# Patient Record
Sex: FEMALE | Race: ASIAN | Employment: OTHER | URBAN - METROPOLITAN AREA
[De-identification: names, ages, dates, MRNs, and addresses within clinical notes are randomized per-mention and may not be internally consistent; named-entity substitution may affect disease eponyms.]

---

## 2017-08-07 PROBLEM — F41.9 ANXIETY: Status: ACTIVE | Noted: 2017-08-07

## 2017-11-29 ENCOUNTER — HOSPITAL ENCOUNTER (OUTPATIENT)
Dept: MAMMOGRAPHY | Age: 67
Discharge: HOME | End: 2017-11-29
Attending: FAMILY MEDICINE
Payer: OTHER GOVERNMENT

## 2017-11-29 DIAGNOSIS — Z12.31 VISIT FOR SCREENING MAMMOGRAM: ICD-10-CM

## 2017-11-29 PROCEDURE — 77067 SCR MAMMO BI INCL CAD: CPT

## 2017-11-30 ENCOUNTER — APPOINTMENT (RX ONLY)
Dept: URBAN - METROPOLITAN AREA CLINIC 349 | Facility: CLINIC | Age: 67
Setting detail: DERMATOLOGY
End: 2017-11-30

## 2017-11-30 DIAGNOSIS — L40.0 PSORIASIS VULGARIS: ICD-10-CM | Status: WELL CONTROLLED

## 2017-11-30 DIAGNOSIS — L65.9 NONSCARRING HAIR LOSS, UNSPECIFIED: ICD-10-CM

## 2017-11-30 PROBLEM — J45.909 UNSPECIFIED ASTHMA, UNCOMPLICATED: Status: ACTIVE | Noted: 2017-11-30

## 2017-11-30 PROBLEM — E78.5 HYPERLIPIDEMIA, UNSPECIFIED: Status: ACTIVE | Noted: 2017-11-30

## 2017-11-30 PROBLEM — I10 ESSENTIAL (PRIMARY) HYPERTENSION: Status: ACTIVE | Noted: 2017-11-30

## 2017-11-30 PROCEDURE — ? PRESCRIPTION

## 2017-11-30 PROCEDURE — ? RECOMMENDATIONS

## 2017-11-30 PROCEDURE — 99214 OFFICE O/P EST MOD 30 MIN: CPT

## 2017-11-30 PROCEDURE — ? COUNSELING

## 2017-11-30 RX ORDER — CALCIPOTRIENE AND BETAMETHASONE DIPROPIONATE 50; .5 UG/G; MG/G
SUSPENSION TOPICAL
Qty: 1 | Refills: 3 | Status: ERX

## 2017-11-30 ASSESSMENT — LOCATION SIMPLE DESCRIPTION DERM
LOCATION SIMPLE: SCALP
LOCATION SIMPLE: POSTERIOR SCALP
LOCATION SIMPLE: LEFT ELBOW
LOCATION SIMPLE: LEFT KNEE
LOCATION SIMPLE: RIGHT KNEE
LOCATION SIMPLE: RIGHT ELBOW

## 2017-11-30 ASSESSMENT — LOCATION DETAILED DESCRIPTION DERM
LOCATION DETAILED: RIGHT LATERAL ELBOW
LOCATION DETAILED: LEFT KNEE
LOCATION DETAILED: POSTERIOR MID-PARIETAL SCALP
LOCATION DETAILED: RIGHT KNEE
LOCATION DETAILED: LEFT ELBOW
LOCATION DETAILED: LEFT SUPERIOR PARIETAL SCALP

## 2017-11-30 ASSESSMENT — LOCATION ZONE DERM
LOCATION ZONE: SCALP
LOCATION ZONE: ARM
LOCATION ZONE: LEG

## 2017-11-30 NOTE — PROCEDURE: RECOMMENDATIONS
Detail Level: Zone
Recommendations (Free Text): Qilib to use daily as directed for at least one year.
Recommendations (Free Text): Continue taclonex on patches twice daily x 2 wks then once daily as needed\\nFluocinonolone scalp oil as needed for itching scaling spots on scalp

## 2017-12-07 NOTE — PROGRESS NOTES
Please call the patient regarding her abnormal result. Thank you! Mammogram reported as asymmetry. Sometimes this happens when women have dense or fibrous breasts. They recommend spot compression and ultrasound.

## 2017-12-11 ENCOUNTER — HOSPITAL ENCOUNTER (OUTPATIENT)
Dept: MAMMOGRAPHY | Age: 67
Discharge: HOME | End: 2017-12-11
Attending: FAMILY MEDICINE
Payer: OTHER GOVERNMENT

## 2017-12-11 DIAGNOSIS — R92.8 ABNORMAL SCREENING MAMMOGRAM: ICD-10-CM

## 2017-12-11 PROCEDURE — 77065 DX MAMMO INCL CAD UNI: CPT

## 2018-01-23 PROBLEM — H91.11 PRESBYCUSIS OF RIGHT EAR WITH UNRESTRICTED HEARING OF LEFT EAR: Status: ACTIVE | Noted: 2018-01-23

## 2018-11-29 ENCOUNTER — APPOINTMENT (RX ONLY)
Dept: URBAN - METROPOLITAN AREA CLINIC 349 | Facility: CLINIC | Age: 68
Setting detail: DERMATOLOGY
End: 2018-11-29

## 2018-11-29 DIAGNOSIS — L40.0 PSORIASIS VULGARIS: ICD-10-CM | Status: WELL CONTROLLED

## 2018-11-29 DIAGNOSIS — L65.9 NONSCARRING HAIR LOSS, UNSPECIFIED: ICD-10-CM | Status: IMPROVED

## 2018-11-29 PROBLEM — E78.5 HYPERLIPIDEMIA, UNSPECIFIED: Status: ACTIVE | Noted: 2018-11-29

## 2018-11-29 PROCEDURE — ? PRESCRIPTION

## 2018-11-29 PROCEDURE — 99213 OFFICE O/P EST LOW 20 MIN: CPT

## 2018-11-29 PROCEDURE — ? RECOMMENDATIONS

## 2018-11-29 PROCEDURE — ? COUNSELING

## 2018-11-29 RX ORDER — FLUOCINOLONE ACETONIDE 0.11 MG/ML
OIL TOPICAL
Qty: 1 | Refills: 3 | Status: ERX

## 2018-11-29 RX ORDER — CALCIPOTRIENE AND BETAMETHASONE DIPROPIONATE 50; .5 UG/G; MG/G
SUSPENSION TOPICAL
Qty: 1 | Refills: 3 | Status: ERX

## 2018-11-29 ASSESSMENT — LOCATION SIMPLE DESCRIPTION DERM
LOCATION SIMPLE: POSTERIOR SCALP
LOCATION SIMPLE: SCALP
LOCATION SIMPLE: LEFT ELBOW
LOCATION SIMPLE: LEFT KNEE
LOCATION SIMPLE: RIGHT KNEE
LOCATION SIMPLE: RIGHT ELBOW

## 2018-11-29 ASSESSMENT — LOCATION DETAILED DESCRIPTION DERM
LOCATION DETAILED: RIGHT LATERAL ELBOW
LOCATION DETAILED: LEFT ELBOW
LOCATION DETAILED: LEFT SUPERIOR PARIETAL SCALP
LOCATION DETAILED: POSTERIOR MID-PARIETAL SCALP
LOCATION DETAILED: RIGHT KNEE
LOCATION DETAILED: LEFT KNEE

## 2018-11-29 ASSESSMENT — LOCATION ZONE DERM
LOCATION ZONE: ARM
LOCATION ZONE: LEG
LOCATION ZONE: SCALP

## 2019-01-10 PROBLEM — J30.9 ALLERGIC RHINITIS: Status: ACTIVE | Noted: 2019-01-10

## 2019-01-18 ENCOUNTER — HOSPITAL ENCOUNTER (OUTPATIENT)
Dept: MAMMOGRAPHY | Age: 69
Discharge: HOME | End: 2019-01-18
Payer: OTHER GOVERNMENT

## 2019-01-18 DIAGNOSIS — Z12.31 VISIT FOR SCREENING MAMMOGRAM: ICD-10-CM

## 2019-01-18 PROCEDURE — 77067 SCR MAMMO BI INCL CAD: CPT

## 2019-04-10 PROBLEM — Z12.39 SCREENING FOR BREAST CANCER: Status: ACTIVE | Noted: 2019-04-10

## 2019-04-10 PROBLEM — Z71.89 ACP (ADVANCE CARE PLANNING): Status: ACTIVE | Noted: 2019-04-10

## 2019-04-10 PROBLEM — Z00.00 PHYSICAL EXAM: Status: ACTIVE | Noted: 2019-04-10

## 2019-04-10 PROBLEM — Z23 ENCOUNTER FOR IMMUNIZATION: Status: ACTIVE | Noted: 2019-04-10

## 2019-09-23 PROBLEM — Z91.09 ENVIRONMENTAL ALLERGIES: Status: ACTIVE | Noted: 2019-09-23

## 2019-09-23 PROBLEM — N18.9 CHRONIC KIDNEY DISEASE: Status: ACTIVE | Noted: 2019-09-23

## 2019-11-27 ENCOUNTER — APPOINTMENT (RX ONLY)
Dept: URBAN - METROPOLITAN AREA CLINIC 349 | Facility: CLINIC | Age: 69
Setting detail: DERMATOLOGY
End: 2019-11-27

## 2019-11-27 DIAGNOSIS — L40.0 PSORIASIS VULGARIS: ICD-10-CM | Status: WELL CONTROLLED

## 2019-11-27 PROBLEM — I10 ESSENTIAL (PRIMARY) HYPERTENSION: Status: ACTIVE | Noted: 2019-11-27

## 2019-11-27 PROBLEM — E13.9 OTHER SPECIFIED DIABETES MELLITUS WITHOUT COMPLICATIONS: Status: ACTIVE | Noted: 2019-11-27

## 2019-11-27 PROBLEM — J45.909 UNSPECIFIED ASTHMA, UNCOMPLICATED: Status: ACTIVE | Noted: 2019-11-27

## 2019-11-27 PROCEDURE — ? TREATMENT REGIMEN

## 2019-11-27 PROCEDURE — ? COUNSELING

## 2019-11-27 PROCEDURE — ? PRESCRIPTION

## 2019-11-27 PROCEDURE — 99213 OFFICE O/P EST LOW 20 MIN: CPT

## 2019-11-27 RX ORDER — FLUOCINOLONE ACETONIDE 0.11 MG/ML
OIL TOPICAL
Qty: 1 | Refills: 6 | Status: ERX | COMMUNITY
Start: 2019-11-27

## 2019-11-27 RX ORDER — CALCIPOTRIENE AND BETAMETHASONE DIPROPIONATE 50; .5 UG/G; MG/G
SUSPENSION TOPICAL
Qty: 1 | Refills: 6 | Status: ERX | COMMUNITY
Start: 2019-11-27

## 2019-11-27 RX ADMIN — FLUOCINOLONE ACETONIDE: 0.11 OIL TOPICAL at 00:00

## 2019-11-27 RX ADMIN — CALCIPOTRIENE AND BETAMETHASONE DIPROPIONATE: 50; .5 SUSPENSION TOPICAL at 00:00

## 2019-11-27 ASSESSMENT — LOCATION SIMPLE DESCRIPTION DERM
LOCATION SIMPLE: RIGHT ELBOW
LOCATION SIMPLE: RIGHT KNEE
LOCATION SIMPLE: LEFT KNEE
LOCATION SIMPLE: LEFT ELBOW
LOCATION SIMPLE: SCALP

## 2019-11-27 ASSESSMENT — LOCATION ZONE DERM
LOCATION ZONE: ARM
LOCATION ZONE: SCALP
LOCATION ZONE: LEG

## 2019-11-27 ASSESSMENT — LOCATION DETAILED DESCRIPTION DERM
LOCATION DETAILED: RIGHT KNEE
LOCATION DETAILED: LEFT SUPERIOR PARIETAL SCALP
LOCATION DETAILED: RIGHT LATERAL ELBOW
LOCATION DETAILED: LEFT ELBOW
LOCATION DETAILED: LEFT KNEE

## 2019-11-27 NOTE — PROCEDURE: TREATMENT REGIMEN
Action 3: Continue
Detail Level: Zone
Continue Regimen: Continue taclonex on patches twice daily x 2 wks then once daily as needed\\nFluocinonolone scalp oil as needed for itching scaling spots on scalp

## 2019-12-02 ENCOUNTER — RX ONLY (OUTPATIENT)
Age: 69
Setting detail: RX ONLY
End: 2019-12-02

## 2019-12-02 RX ORDER — CALCIPOTRIENE AND BETAMETHASONE DIPROPIONATE 50; .5 UG/G; MG/G
SUSPENSION TOPICAL
Qty: 1 | Refills: 6 | Status: ERX

## 2020-03-27 PROBLEM — H90.3 SENSORINEURAL HEARING LOSS (SNHL) OF BOTH EARS: Status: ACTIVE | Noted: 2020-03-27

## 2020-09-05 ENCOUNTER — HOSPITAL ENCOUNTER (OUTPATIENT)
Dept: LAB | Age: 70
Discharge: HOME | End: 2020-09-05

## 2020-09-05 LAB
ANION GAP SERPL CALC-SCNC: 10 MMOL/L (ref 7–16)
BUN SERPL-MCNC: 20 MG/DL (ref 8–23)
CALCIUM SERPL-MCNC: 7.8 MG/DL (ref 8.3–10.4)
CHLORIDE SERPL-SCNC: 97 MMOL/L (ref 98–107)
CO2 SERPL-SCNC: 27 MMOL/L (ref 21–32)
CREAT SERPL-MCNC: 1.22 MG/DL (ref 0.6–1)
GLUCOSE SERPL-MCNC: 57 MG/DL (ref 65–100)
MAGNESIUM SERPL-MCNC: 2 MG/DL (ref 1.8–2.4)
POTASSIUM SERPL-SCNC: 3 MMOL/L (ref 3.5–5.1)
SODIUM SERPL-SCNC: 134 MMOL/L (ref 136–145)

## 2020-09-05 PROCEDURE — 83735 ASSAY OF MAGNESIUM: CPT

## 2020-09-05 PROCEDURE — 80048 BASIC METABOLIC PNL TOTAL CA: CPT

## 2020-09-29 PROBLEM — I51.89 DIASTOLIC DYSFUNCTION: Status: ACTIVE | Noted: 2020-09-29

## 2020-09-29 PROBLEM — J18.9 PNEUMONIA OF BOTH LOWER LOBES DUE TO INFECTIOUS ORGANISM: Status: ACTIVE | Noted: 2020-09-29

## 2020-09-29 PROBLEM — Z09 ENCOUNTER FOR EXAMINATION FOLLOWING TREATMENT AT HOSPITAL: Status: ACTIVE | Noted: 2020-09-29

## 2020-09-29 PROBLEM — D64.9 ABSOLUTE ANEMIA: Status: ACTIVE | Noted: 2020-09-29

## 2020-09-29 PROBLEM — R79.0 LOW MAGNESIUM LEVEL: Status: ACTIVE | Noted: 2020-09-29

## 2020-09-29 PROBLEM — E83.51 LOW CALCIUM LEVELS: Status: ACTIVE | Noted: 2020-09-29

## 2020-09-29 PROBLEM — F32.0 CURRENT MILD EPISODE OF MAJOR DEPRESSIVE DISORDER WITHOUT PRIOR EPISODE (HCC): Status: ACTIVE | Noted: 2020-09-29

## 2020-09-29 PROBLEM — R53.81 DEBILITY: Status: ACTIVE | Noted: 2020-09-29

## 2020-09-30 ENCOUNTER — HOSPITAL ENCOUNTER (OUTPATIENT)
Dept: GENERAL RADIOLOGY | Age: 70
Discharge: HOME | End: 2020-09-30
Attending: FAMILY MEDICINE
Payer: OTHER GOVERNMENT

## 2020-09-30 ENCOUNTER — HOSPITAL ENCOUNTER (OUTPATIENT)
Dept: LAB | Age: 70
Discharge: HOME | End: 2020-09-30
Attending: FAMILY MEDICINE
Payer: OTHER GOVERNMENT

## 2020-09-30 DIAGNOSIS — F41.9 ANXIETY: ICD-10-CM

## 2020-09-30 DIAGNOSIS — J45.20 MILD INTERMITTENT ASTHMA WITHOUT COMPLICATION: ICD-10-CM

## 2020-09-30 DIAGNOSIS — D50.8 OTHER IRON DEFICIENCY ANEMIA: ICD-10-CM

## 2020-09-30 DIAGNOSIS — E78.5 DYSLIPIDEMIA: ICD-10-CM

## 2020-09-30 DIAGNOSIS — J18.9 PNEUMONIA OF BOTH LOWER LOBES DUE TO INFECTIOUS ORGANISM: ICD-10-CM

## 2020-09-30 DIAGNOSIS — I51.89 DIASTOLIC DYSFUNCTION: ICD-10-CM

## 2020-09-30 DIAGNOSIS — E03.9 ACQUIRED HYPOTHYROIDISM: ICD-10-CM

## 2020-09-30 DIAGNOSIS — E83.51 LOW CALCIUM LEVELS: ICD-10-CM

## 2020-09-30 DIAGNOSIS — F32.0 CURRENT MILD EPISODE OF MAJOR DEPRESSIVE DISORDER WITHOUT PRIOR EPISODE (HCC): ICD-10-CM

## 2020-09-30 DIAGNOSIS — R79.0 LOW MAGNESIUM LEVEL: ICD-10-CM

## 2020-09-30 DIAGNOSIS — Z09 ENCOUNTER FOR EXAMINATION FOLLOWING TREATMENT AT HOSPITAL: ICD-10-CM

## 2020-09-30 LAB
ALBUMIN SERPL-MCNC: 3.4 G/DL (ref 3.2–4.6)
ALBUMIN/GLOB SERPL: 0.6 {RATIO} (ref 1.2–3.5)
ALP SERPL-CCNC: 108 U/L (ref 50–136)
ALT SERPL-CCNC: 64 U/L (ref 12–65)
ANION GAP SERPL CALC-SCNC: 7 MMOL/L (ref 7–16)
AST SERPL-CCNC: 84 U/L (ref 15–37)
BASOPHILS # BLD: 0 K/UL (ref 0–0.2)
BASOPHILS NFR BLD: 0 % (ref 0–2)
BILIRUB SERPL-MCNC: 0.6 MG/DL (ref 0.2–1.1)
BUN SERPL-MCNC: 15 MG/DL (ref 8–23)
CALCIUM SERPL-MCNC: 9.1 MG/DL (ref 8.3–10.4)
CALCIUM SERPL-MCNC: 9.1 MG/DL (ref 8.3–10.4)
CHLORIDE SERPL-SCNC: 104 MMOL/L (ref 98–107)
CHOLEST SERPL-MCNC: 228 MG/DL
CO2 SERPL-SCNC: 27 MMOL/L (ref 21–32)
CREAT SERPL-MCNC: 1.2 MG/DL (ref 0.6–1)
DIFFERENTIAL METHOD BLD: ABNORMAL
EOSINOPHIL # BLD: 0.5 K/UL (ref 0–0.8)
EOSINOPHIL NFR BLD: 6 % (ref 0.5–7.8)
ERYTHROCYTE [DISTWIDTH] IN BLOOD BY AUTOMATED COUNT: 19.1 % (ref 11.9–14.6)
EST. AVERAGE GLUCOSE BLD GHB EST-MCNC: 108 MG/DL
GLOBULIN SER CALC-MCNC: 5.8 G/DL (ref 2.3–3.5)
GLUCOSE SERPL-MCNC: 92 MG/DL (ref 65–100)
HBA1C MFR BLD: 5.4 %
HCT VFR BLD AUTO: 30.9 % (ref 35.8–46.3)
HDLC SERPL-MCNC: 74 MG/DL (ref 40–60)
HDLC SERPL: 3.1 {RATIO}
HGB BLD-MCNC: 9.8 G/DL (ref 11.7–15.4)
IMM GRANULOCYTES # BLD AUTO: 0 K/UL (ref 0–0.5)
IMM GRANULOCYTES NFR BLD AUTO: 0 % (ref 0–5)
IRON SERPL-MCNC: 63 UG/DL (ref 35–150)
LDLC SERPL CALC-MCNC: 130.2 MG/DL
LIPID PROFILE,FLP: ABNORMAL
LYMPHOCYTES # BLD: 3.5 K/UL (ref 0.5–4.6)
LYMPHOCYTES NFR BLD: 43 % (ref 13–44)
MAGNESIUM SERPL-MCNC: 2.2 MG/DL (ref 1.8–2.4)
MCH RBC QN AUTO: 26.3 PG (ref 26.1–32.9)
MCHC RBC AUTO-ENTMCNC: 31.7 G/DL (ref 31.4–35)
MCV RBC AUTO: 82.8 FL (ref 79.6–97.8)
MONOCYTES # BLD: 0.8 K/UL (ref 0.1–1.3)
MONOCYTES NFR BLD: 9 % (ref 4–12)
NEUTS SEG # BLD: 3.3 K/UL (ref 1.7–8.2)
NEUTS SEG NFR BLD: 41 % (ref 43–78)
NRBC # BLD: 0 K/UL (ref 0–0.2)
PLATELET # BLD AUTO: 199 K/UL (ref 150–450)
PMV BLD AUTO: 11.6 FL (ref 9.4–12.3)
POTASSIUM SERPL-SCNC: 3.2 MMOL/L (ref 3.5–5.1)
PROT SERPL-MCNC: 9.2 G/DL (ref 6.3–8.2)
PTH-INTACT SERPL-MCNC: 30 PG/ML (ref 18.5–88)
RBC # BLD AUTO: 3.73 M/UL (ref 4.05–5.2)
SODIUM SERPL-SCNC: 138 MMOL/L (ref 136–145)
T4 FREE SERPL-MCNC: 1.3 NG/DL (ref 0.78–1.46)
TRIGL SERPL-MCNC: 119 MG/DL (ref 35–150)
TSH SERPL DL<=0.005 MIU/L-ACNC: 13.5 UIU/ML
VLDLC SERPL CALC-MCNC: 23.8 MG/DL (ref 6–23)
WBC # BLD AUTO: 8.1 K/UL (ref 4.3–11.1)

## 2020-09-30 PROCEDURE — 83036 HEMOGLOBIN GLYCOSYLATED A1C: CPT

## 2020-09-30 PROCEDURE — 85025 COMPLETE CBC W/AUTO DIFF WBC: CPT

## 2020-09-30 PROCEDURE — 36415 COLL VENOUS BLD VENIPUNCTURE: CPT

## 2020-09-30 PROCEDURE — 83735 ASSAY OF MAGNESIUM: CPT

## 2020-09-30 PROCEDURE — 84439 ASSAY OF FREE THYROXINE: CPT

## 2020-09-30 PROCEDURE — 71046 X-RAY EXAM CHEST 2 VIEWS: CPT

## 2020-09-30 PROCEDURE — 83970 ASSAY OF PARATHORMONE: CPT

## 2020-09-30 PROCEDURE — 84443 ASSAY THYROID STIM HORMONE: CPT

## 2020-09-30 PROCEDURE — 80053 COMPREHEN METABOLIC PANEL: CPT

## 2020-09-30 PROCEDURE — 83540 ASSAY OF IRON: CPT

## 2020-09-30 PROCEDURE — 80061 LIPID PANEL: CPT

## 2020-10-07 PROBLEM — Z12.11 ENCOUNTER FOR SCREENING COLONOSCOPY: Status: ACTIVE | Noted: 2020-10-07

## 2020-10-07 PROBLEM — Z99.81 O2 DEPENDENT: Status: ACTIVE | Noted: 2020-10-07

## 2020-10-07 PROBLEM — R74.01 ELEVATED AST (SGOT): Status: ACTIVE | Noted: 2020-10-07

## 2020-10-10 PROBLEM — J96.00 ACUTE RESPIRATORY FAILURE (HCC): Status: ACTIVE | Noted: 2020-10-10

## 2020-11-18 ENCOUNTER — ANESTHESIA EVENT (OUTPATIENT)
Dept: ENDOSCOPY | Age: 70
End: 2020-11-18
Payer: OTHER GOVERNMENT

## 2020-11-19 ENCOUNTER — HOSPITAL ENCOUNTER (OUTPATIENT)
Age: 70
Setting detail: OUTPATIENT SURGERY
Discharge: HOME | End: 2020-11-19
Attending: INTERNAL MEDICINE | Admitting: INTERNAL MEDICINE
Payer: OTHER GOVERNMENT

## 2020-11-19 ENCOUNTER — ANESTHESIA (OUTPATIENT)
Dept: ENDOSCOPY | Age: 70
End: 2020-11-19
Payer: OTHER GOVERNMENT

## 2020-11-19 VITALS
RESPIRATION RATE: 14 BRPM | DIASTOLIC BLOOD PRESSURE: 70 MMHG | OXYGEN SATURATION: 95 % | TEMPERATURE: 97.8 F | SYSTOLIC BLOOD PRESSURE: 154 MMHG | BODY MASS INDEX: 22.47 KG/M2 | HEART RATE: 79 BPM | WEIGHT: 119 LBS | HEIGHT: 61 IN

## 2020-11-19 PROCEDURE — 76060000033 HC ANESTHESIA 1 TO 1.5 HR: Performed by: INTERNAL MEDICINE

## 2020-11-19 PROCEDURE — 74011000250 HC RX REV CODE- 250: Performed by: REGISTERED NURSE

## 2020-11-19 PROCEDURE — 2709999900 HC NON-CHARGEABLE SUPPLY: Performed by: INTERNAL MEDICINE

## 2020-11-19 PROCEDURE — 87081 CULTURE SCREEN ONLY: CPT

## 2020-11-19 PROCEDURE — 77030021593 HC FCPS BIOP ENDOSC BSC -A: Performed by: INTERNAL MEDICINE

## 2020-11-19 PROCEDURE — 77030013991 HC SNR POLYP ENDOSC BSC -A: Performed by: INTERNAL MEDICINE

## 2020-11-19 PROCEDURE — 88312 SPECIAL STAINS GROUP 1: CPT

## 2020-11-19 PROCEDURE — 74011250636 HC RX REV CODE- 250/636: Performed by: REGISTERED NURSE

## 2020-11-19 PROCEDURE — 88305 TISSUE EXAM BY PATHOLOGIST: CPT

## 2020-11-19 PROCEDURE — 76040000026: Performed by: INTERNAL MEDICINE

## 2020-11-19 PROCEDURE — 74011250636 HC RX REV CODE- 250/636: Performed by: ANESTHESIOLOGY

## 2020-11-19 RX ORDER — LIDOCAINE HYDROCHLORIDE 20 MG/ML
INJECTION, SOLUTION EPIDURAL; INFILTRATION; INTRACAUDAL; PERINEURAL AS NEEDED
Status: DISCONTINUED | OUTPATIENT
Start: 2020-11-19 | End: 2020-11-19 | Stop reason: HOSPADM

## 2020-11-19 RX ORDER — PROPOFOL 10 MG/ML
INJECTION, EMULSION INTRAVENOUS AS NEEDED
Status: DISCONTINUED | OUTPATIENT
Start: 2020-11-19 | End: 2020-11-19 | Stop reason: HOSPADM

## 2020-11-19 RX ORDER — SODIUM CHLORIDE, SODIUM LACTATE, POTASSIUM CHLORIDE, CALCIUM CHLORIDE 600; 310; 30; 20 MG/100ML; MG/100ML; MG/100ML; MG/100ML
25 INJECTION, SOLUTION INTRAVENOUS CONTINUOUS
Status: DISCONTINUED | OUTPATIENT
Start: 2020-11-19 | End: 2020-11-19 | Stop reason: HOSPADM

## 2020-11-19 RX ORDER — SODIUM CHLORIDE, SODIUM LACTATE, POTASSIUM CHLORIDE, CALCIUM CHLORIDE 600; 310; 30; 20 MG/100ML; MG/100ML; MG/100ML; MG/100ML
100 INJECTION, SOLUTION INTRAVENOUS CONTINUOUS
Status: DISCONTINUED | OUTPATIENT
Start: 2020-11-19 | End: 2020-11-19 | Stop reason: HOSPADM

## 2020-11-19 RX ORDER — PROPOFOL 10 MG/ML
INJECTION, EMULSION INTRAVENOUS
Status: DISCONTINUED | OUTPATIENT
Start: 2020-11-19 | End: 2020-11-19 | Stop reason: HOSPADM

## 2020-11-19 RX ADMIN — LIDOCAINE HYDROCHLORIDE 50 MG: 20 INJECTION, SOLUTION EPIDURAL; INFILTRATION; INTRACAUDAL; PERINEURAL at 09:08

## 2020-11-19 RX ADMIN — PROPOFOL 50 MG: 10 INJECTION, EMULSION INTRAVENOUS at 09:08

## 2020-11-19 RX ADMIN — PROPOFOL 140 MCG/KG/MIN: 10 INJECTION, EMULSION INTRAVENOUS at 09:08

## 2020-11-19 RX ADMIN — SODIUM CHLORIDE, SODIUM LACTATE, POTASSIUM CHLORIDE, AND CALCIUM CHLORIDE 100 ML/HR: 600; 310; 30; 20 INJECTION, SOLUTION INTRAVENOUS at 08:33

## 2020-11-19 NOTE — ANESTHESIA POSTPROCEDURE EVALUATION
Procedure(s):  ESOPHAGOGASTRODUODENOSCOPY (EGD)  COLONOSCOPY/ BMI 22  ESOPHAGOGASTRODUODENAL (EGD) BIOPSY  ENDOSCOPIC POLYPECTOMY. total IV anesthesia    Anesthesia Post Evaluation      Multimodal analgesia: multimodal analgesia not used between 6 hours prior to anesthesia start to PACU discharge  Patient location during evaluation: PACU  Patient participation: complete - patient participated  Level of consciousness: awake and alert  Pain management: adequate  Airway patency: patent  Anesthetic complications: no  Cardiovascular status: hemodynamically stable  Respiratory status: acceptable  Hydration status: acceptable        INITIAL Post-op Vital signs:   Vitals Value Taken Time   /89 11/19/2020 10:16 AM   Temp 36.6 °C (97.8 °F) 11/19/2020 10:07 AM   Pulse 87 11/19/2020 10:20 AM   Resp 16 11/19/2020 10:07 AM   SpO2 98 % 11/19/2020 10:20 AM   Vitals shown include unvalidated device data.

## 2020-11-19 NOTE — ROUTINE PROCESS
VSS. No complaints noted. Education given and reviewed with son who voiced understanding. Pt wheeled out via wheelchair by Tom Santos, project search student.

## 2020-11-19 NOTE — OP NOTES
[address] [facility]  OPERATIVE REPORT    Name:  Ken Trent  MR#:  [id number]  :  1950  ACCOUNT #:  [de-identified]  DATE OF SERVICE:  2020    PREOPERATIVE DIAGNOSIS:  Microcytic anemia. POSTOPERATIVE DIAGNOSES:  1. Gastritis. 2.  Small gastric varices. PROCEDURES PERFORMED:  1. EGD with biopsy. 2.  EGD with KEI test.    PRIMARY GASTROENTEROLOGIST:  Tri Rees MD    SURGEON:  None. ASSISTANT:  None. ANESTHESIA:  Per MAC anesthesia. COMPLICATIONS:  None. SPECIMENS REMOVED:  To laboratory:  1. Duodenal biopsy. 2.  Gastric biopsy. 3.  KEI test.    IMPLANTS:  None. ESTIMATED BLOOD LOSS:  0-1 mL. INSTRUMENT:  GIF-H190 video endoscope. SUBJECTIVE:  A 27-year-old female who is undergoing an upper endoscopy because of microcytic anemia. PROCEDURE:  The patient was anesthetized, the videoscope was passed through the hypopharynx and esophagus with minimal difficulty. We had to use the pediatric bite block because of the patient's small jaw. There was minimal amount of heme noted from some cracked dry lips as we placed the bite block in the patient. Proximal, mid, and distal esophagus were unremarkable except for a hiatal hernia sac. Once inside the stomach, the body and antrum were unremarkable except for streaky area of some erythema and minimal heme in the antrum. A retroflexed view of the angularis was normal.  A retroflexed view of the EG junction and cardia revealed no other specific abnormalities except for what appeared to be small gastric varices. I did not identify the esophageal varices. Attention was then turned to the duodenum. The duodenal bulb and C-loop were normal.  No active ulcer disease was seen. Because of microcytic anemia, small bowel biopsy obtained to exclude celiac disease. The endoscope was brought back into the stomach where samples of gastric mucosa were obtained for histology and KEI test, given the gastritis.   The endoscope was brought back into the stomach where air was decompressed. The endoscope was backed out of the esophagus and out of the patient. The vocal cords appeared normal.  The patient tolerated the procedure well and remained in the endoscopy suite in stable condition. IMPRESSION:  1. Hiatal hernia sac. 2.  Gastritis. 3.  Small gastric varices. PLAN:  Diagnostic:  1. Follow up biopsies. 2.  Follow up KEI test results. 3.  Colonoscopy now as planned. 4. tTG and IgA level. Therapeutic:  1. H2 blockers. 2.  The patient will need a liver evaluation to evaluate for portal hypertension.       Horacio Rodgers MD      [id number]  D:  11/19/2020 10:20  T:  11/19/2020 16:55  JOB #:  [id number]  CC:  [facility]       MD Nathaniel Lew Caro, MD

## 2020-11-19 NOTE — ANESTHESIA PREPROCEDURE EVALUATION
Relevant Problems   No relevant active problems       Anesthetic History               Review of Systems / Medical History      Pulmonary            Asthma        Neuro/Psych              Cardiovascular    Hypertension                   GI/Hepatic/Renal                Endo/Other    Diabetes: type 2  Hypothyroidism  Arthritis     Other Findings   Comments: /Bronchiectasis in the past  Pneumonia with respiratory failure 9/20  Oxygen at home as needed          Physical Exam    Airway  Mallampati: II  TM Distance: > 6 cm  Neck ROM: normal range of motion   Mouth opening: Normal     Cardiovascular               Dental  No notable dental hx       Pulmonary    Rhonchi:bibasilar      Rales:bilateral  Prolonged expiration     Abdominal         Other Findings   Comments: Sat 92% on room air         Anesthetic Plan    ASA: 4  Anesthesia type: total IV anesthesia            Anesthetic plan and risks discussed with: Patient

## 2020-11-19 NOTE — OP NOTES
[address] [facility]  OPERATIVE REPORT    Name:  Vicente Sharma  MR#:  [id number]  :  1950  ACCOUNT #:  [de-identified]  DATE OF SERVICE:  2020    PREOPERATIVE DIAGNOSES:  1. Microcytic anemia. 2.  Colon screening. POSTOPERATIVE DIAGNOSES:  1. Colon polyp - snared and removed and cold biopsied away. 2.  Sigmoid diverticulosis. PROCEDURE PERFORMED:  Colonoscopy. PRIMARY GASTROENTEROLOGIST:  Nic Becerra MD    SURGEON:  None. ASSISTANT:  None. ANESTHESIA:  MAC anesthesia. COMPLICATIONS:  None. SPECIMENS REMOVED:  To laboratory, colon polyp. IMPLANTS:  None. ESTIMATED BLOOD LOSS:  0-1 mL. INSTRUMENT:  PCF-H190 video colonoscope. SUBJECTIVE:  A 27-year-old female who is undergoing colonoscopy because of microcytic anemia and need for colon screening. PROCEDURE:  The patient was anesthetized and positioned. A digital rectal examination was performed, which was unremarkable. The pediatric colonoscope was inserted into the rectal vault and the procedure was begun. Under direct visualization, the cecum and the ileocecal valve were identified. The endoscope was removed from the cecum. The cecum and ascending colon were unremarkable. In the proximal transverse colon, there was an approximately 8-mm polyp. The polyp was cold snared and removed, but because residual polyp, more polyp was removed with the cold biopsy forceps. After documentation of hemostasis, the remaining transverse colon was visualized and normal.  Descending and sigmoid colon were unremarkable except for sigmoid diverticulosis. With the endoscope in the rectum, a retroflexed view of the anal verge was performed and was unremarkable. The endoscope was placed at the end of view and removed from the patient. The patient tolerated the procedure well and taken to the recovery area in stable condition. IMPRESSION:  1.   Transverse colon polyp - cold snared and removed and colon biopsied away. 2.  Sigmoid diverticulosis. PLAN:  Diagnostic:  1. Follow up biopsies. 2.  Follow up colonoscopy pending biopsy results. 3.  Stool for FIT. 4.  Consider small bowel evaluation pending the results of the above.       Alexei Dawkins MD      [id number]  D:  11/19/2020 10:32  T:  11/19/2020 17:29  JOB #:  [id number]  CC:  [facility]       MD Baron Valdez MD

## 2020-11-19 NOTE — H&P
Gastrointestinal Progress Note    11/19/2020    Admit Date: 11/19/2020    Subjective:     Patient is NPO for an EGD and colonoscopy (microcytic anemia, colon screening)    Pain: Patient complains of no abdominal pain. Bowel Movements: Normal    Bleeding:  None    Current Facility-Administered Medications   Medication Dose Route Frequency    lactated Ringers infusion  100 mL/hr IntraVENous CONTINUOUS    lactated Ringers infusion  25 mL/hr IntraVENous CONTINUOUS        Objective:     Blood pressure 127/67, pulse 82, temperature 97.6 °F (36.4 °C), resp. rate 18, height 5' 1\" (1.549 m), weight 54 kg (119 lb), SpO2 95 %, not currently breastfeeding. No intake/output data recorded. No intake/output data recorded. EXAM:  HEART: regular rate and rhythm, S1, S2 normal, no murmur, click, rub or gallop, LUNGS: chest clear, no wheezing, rales, normal symmetric air entry, Heart exam - S1, S2 normal, no murmur, no gallop, rate regular and ABDOMEN:  Bowel sounds are normal, liver ~1-2 cm. Below the costal margin, spleen is not enlarged. Large lower abdominal scar. Data Review  No results found for this or any previous visit (from the past 24 hour(s)). Assessment:     Active Problems:  Microcytic anema  Colon screening  Hypothyroidism  HTN  Septic shock--09/20. Plan:     EGD and colonoscopy. Risks (bleed, perforation, infection, untoward CV or resp. Event, mortality, etc.), benefits and alternatives were discussed with the patient who agrees to proceed.   Jazmín Arguelles MD

## 2020-11-19 NOTE — DISCHARGE INSTRUCTIONS
Gastrointestinal Esophagogastroduodenoscopy (EGD) - Upper Exam Discharge Instructions    1. Call Dr. Courtney Jacob for any problems or questions. 2. Contact the doctor's office for follow up appointment as directed. 3. Medication may cause drowsiness for several hours, therefore:  · Do not drive or operate machinery for remainder of the day. · No alcohol today. · Do not make any important or legal decisions for 24 hours. · Do not sign any legal documents for 24 hours. 5. Ordinarily, you may resume regular diet and activity after exam unless otherwise specified by your physician. 6. For mild soreness in your throat you may use Cepacol throat lozenges or warm salt-water gargles as needed. Any additional instructions: office to call pathology results; take prescription as prescribed; follow up in office in 4-6 weeks. Gastrointestinal Colonoscopy/Flexible Sigmoidoscopy - Lower Exam Discharge Instructions. 1. Because of air put into your colon during exam, you may experience some abdominal distension, relieved by the passage of gas, for several hours. 2. Contact your physician if you have any of the following:  · Excessive amount of bleeding - large amount when having a bowel movement. Occasional specks of blood with bowel movement would not be unusual.  · Severe abdominal pain  · Fever or Chills  3. Polyp Removal - follow these additional instructions  · Soft diet for 24 hours, then resume regular diet   · Take Metamucil - 1 tablespoon in juice every morning for 3 days if needed; avoid constipation. · No Aspirin, Advil, Aleve, Nuprin, Ibuprofen, or medications that contain these drugs for 2 weeks. Any additional instructions: office to call pathology results      Instructions given to Ion Alfonso and other family members.

## 2020-11-20 PROBLEM — Z23 NEEDS FLU SHOT: Status: ACTIVE | Noted: 2020-11-20

## 2020-11-20 PROBLEM — I95.0 IDIOPATHIC HYPOTENSION: Status: ACTIVE | Noted: 2020-11-20

## 2020-11-20 LAB
H PYLORI AG TISS QL IMSTN: NEGATIVE
H PYLORI AG TISS QL IMSTN: NEGATIVE

## 2020-11-24 ENCOUNTER — HOSPITAL ENCOUNTER (OUTPATIENT)
Dept: RESPIRATORY THERAPY | Age: 70
Discharge: HOME | End: 2020-11-24
Attending: INTERNAL MEDICINE
Payer: OTHER GOVERNMENT

## 2020-11-24 DIAGNOSIS — J18.9 PNEUMONIA DUE TO INFECTIOUS ORGANISM, UNSPECIFIED LATERALITY, UNSPECIFIED PART OF LUNG: ICD-10-CM

## 2020-11-24 PROCEDURE — 94726 PLETHYSMOGRAPHY LUNG VOLUMES: CPT

## 2020-11-24 PROCEDURE — 94010 BREATHING CAPACITY TEST: CPT

## 2020-11-24 PROCEDURE — 94729 DIFFUSING CAPACITY: CPT

## 2020-11-25 NOTE — PROCEDURES
[address] [facility]  PROCEDURE NOTE    Name:  Martir Recinos  MR#:  [id number]  :  1950  ACCOUNT #:  [de-identified]  DATE OF SERVICE:  2020    INTERPRETATION OF COMPLETE PFTS    SPIROMETRY:  Forced vital capacity reduced at 1.24 L, which is 50% of predicted and FEV1 was reduced at 1.1 L, which was 64% of predicted. FEV1% was normal at 89%. LUNG VOLUMES:  Total lung capacity reduced at 3.2 L, which is 76% of predicted. FRC was normal and residual volume was normal as well. DIFFUSION:  DLCO reduced at 6.9, which is 44% of predicted. DLCO/VA was normal.    IMPRESSION:  Abnormal spirometry consistent with moderately severe restrictive defect. This, however, was not really confirmed by lung volumes suggesting the patient may have an additional obstructive component. There was a severe reduction in diffusion. Consider interstitial lung disease, emphysema, congestive heart failure, pulmonary vascular disease as possible contributors.         Bev Singh MD [id number]  D:  2020 17:12  T:  2020 21:04  JOB #:  [id number]

## 2020-11-27 ENCOUNTER — HOSPITAL ENCOUNTER (OUTPATIENT)
Age: 70
Setting detail: OUTPATIENT SURGERY
Discharge: HOME | End: 2020-11-27
Attending: INTERNAL MEDICINE | Admitting: INTERNAL MEDICINE
Payer: OTHER GOVERNMENT

## 2020-11-27 ENCOUNTER — APPOINTMENT (OUTPATIENT)
Dept: GENERAL RADIOLOGY | Age: 70
End: 2020-11-27
Attending: INTERNAL MEDICINE
Payer: OTHER GOVERNMENT

## 2020-11-27 ENCOUNTER — HOSPITAL ENCOUNTER (OUTPATIENT)
Dept: CT IMAGING | Age: 70
Discharge: HOME | End: 2020-11-27
Attending: INTERNAL MEDICINE
Payer: OTHER GOVERNMENT

## 2020-11-27 VITALS
BODY MASS INDEX: 22.47 KG/M2 | TEMPERATURE: 98.1 F | DIASTOLIC BLOOD PRESSURE: 61 MMHG | RESPIRATION RATE: 16 BRPM | HEIGHT: 61 IN | HEART RATE: 103 BPM | OXYGEN SATURATION: 100 % | SYSTOLIC BLOOD PRESSURE: 140 MMHG | WEIGHT: 119 LBS

## 2020-11-27 DIAGNOSIS — J18.9 PNEUMONIA DUE TO INFECTIOUS ORGANISM, UNSPECIFIED LATERALITY, UNSPECIFIED PART OF LUNG: ICD-10-CM

## 2020-11-27 DIAGNOSIS — R93.89 ABNORMAL CXR (CHEST X-RAY): ICD-10-CM

## 2020-11-27 LAB
BRONCH. LAVAGE DIFF.,BR: NORMAL
EOSINOPHIL NFR BRONCH MANUAL: 0 %
LYMPHOCYTES NFR BRONCH MANUAL: 20 %
MACROPHAGES NFR BRONCH MANUAL: 37 %
NEUTROPHILS NFR BRONCH MANUAL: 43 %

## 2020-11-27 PROCEDURE — 88305 TISSUE EXAM BY PATHOLOGIST: CPT

## 2020-11-27 PROCEDURE — 31632 BRONCHOSCOPY/LUNG BX ADDL: CPT | Performed by: INTERNAL MEDICINE

## 2020-11-27 PROCEDURE — 31628 BRONCHOSCOPY/LUNG BX EACH: CPT | Performed by: INTERNAL MEDICINE

## 2020-11-27 PROCEDURE — 88112 CYTOPATH CELL ENHANCE TECH: CPT

## 2020-11-27 PROCEDURE — 87102 FUNGUS ISOLATION CULTURE: CPT

## 2020-11-27 PROCEDURE — 31624 DX BRONCHOSCOPE/LAVAGE: CPT | Performed by: INTERNAL MEDICINE

## 2020-11-27 PROCEDURE — 76040000025: Performed by: INTERNAL MEDICINE

## 2020-11-27 PROCEDURE — 99152 MOD SED SAME PHYS/QHP 5/>YRS: CPT | Performed by: INTERNAL MEDICINE

## 2020-11-27 PROCEDURE — 74011250636 HC RX REV CODE- 250/636: Performed by: INTERNAL MEDICINE

## 2020-11-27 PROCEDURE — 71250 CT THORAX DX C-: CPT

## 2020-11-27 PROCEDURE — 87070 CULTURE OTHR SPECIMN AEROBIC: CPT

## 2020-11-27 PROCEDURE — 2709999900 HC NON-CHARGEABLE SUPPLY: Performed by: INTERNAL MEDICINE

## 2020-11-27 PROCEDURE — 87116 MYCOBACTERIA CULTURE: CPT

## 2020-11-27 PROCEDURE — 89051 BODY FLUID CELL COUNT: CPT

## 2020-11-27 PROCEDURE — 77030012699 HC VLV SUC CNTRL OCOA -A: Performed by: INTERNAL MEDICINE

## 2020-11-27 PROCEDURE — 86356 MONONUCLEAR CELL ANTIGEN: CPT

## 2020-11-27 RX ORDER — SODIUM CHLORIDE 0.9 % (FLUSH) 0.9 %
5-40 SYRINGE (ML) INJECTION EVERY 8 HOURS
Status: DISCONTINUED | OUTPATIENT
Start: 2020-11-27 | End: 2020-11-27 | Stop reason: HOSPADM

## 2020-11-27 RX ORDER — SODIUM CHLORIDE 0.9 % (FLUSH) 0.9 %
5-40 SYRINGE (ML) INJECTION AS NEEDED
Status: DISCONTINUED | OUTPATIENT
Start: 2020-11-27 | End: 2020-11-27 | Stop reason: HOSPADM

## 2020-11-27 RX ORDER — FLUMAZENIL 0.1 MG/ML
0.2 INJECTION INTRAVENOUS
Status: DISCONTINUED | OUTPATIENT
Start: 2020-11-27 | End: 2020-11-27 | Stop reason: HOSPADM

## 2020-11-27 RX ORDER — NALOXONE HYDROCHLORIDE 0.4 MG/ML
0.4 INJECTION, SOLUTION INTRAMUSCULAR; INTRAVENOUS; SUBCUTANEOUS
Status: DISCONTINUED | OUTPATIENT
Start: 2020-11-27 | End: 2020-11-27 | Stop reason: HOSPADM

## 2020-11-27 RX ORDER — SODIUM CHLORIDE 9 MG/ML
50 INJECTION, SOLUTION INTRAVENOUS CONTINUOUS
Status: DISCONTINUED | OUTPATIENT
Start: 2020-11-27 | End: 2020-11-27 | Stop reason: HOSPADM

## 2020-11-27 RX ORDER — FENTANYL CITRATE 50 UG/ML
25-100 INJECTION, SOLUTION INTRAMUSCULAR; INTRAVENOUS
Status: DISCONTINUED | OUTPATIENT
Start: 2020-11-27 | End: 2020-11-27 | Stop reason: HOSPADM

## 2020-11-27 RX ORDER — LIDOCAINE HYDROCHLORIDE 20 MG/ML
JELLY TOPICAL ONCE
Status: DISCONTINUED | OUTPATIENT
Start: 2020-11-27 | End: 2020-11-27 | Stop reason: HOSPADM

## 2020-11-27 RX ORDER — LIDOCAINE HYDROCHLORIDE 40 MG/ML
SOLUTION TOPICAL ONCE
Status: DISCONTINUED | OUTPATIENT
Start: 2020-11-27 | End: 2020-11-27 | Stop reason: HOSPADM

## 2020-11-27 RX ORDER — MIDAZOLAM HYDROCHLORIDE 1 MG/ML
.25-5 INJECTION, SOLUTION INTRAMUSCULAR; INTRAVENOUS
Status: DISCONTINUED | OUTPATIENT
Start: 2020-11-27 | End: 2020-11-27 | Stop reason: HOSPADM

## 2020-11-27 RX ORDER — LIDOCAINE HYDROCHLORIDE 20 MG/ML
5 SOLUTION OROPHARYNGEAL ONCE
Status: DISCONTINUED | OUTPATIENT
Start: 2020-11-27 | End: 2020-11-27 | Stop reason: HOSPADM

## 2020-11-27 RX ADMIN — FENTANYL CITRATE 50 MCG: 50 INJECTION, SOLUTION INTRAMUSCULAR; INTRAVENOUS at 11:30

## 2020-11-27 RX ADMIN — FENTANYL CITRATE 50 MCG: 50 INJECTION, SOLUTION INTRAMUSCULAR; INTRAVENOUS at 11:25

## 2020-11-27 RX ADMIN — MIDAZOLAM 2 MG: 1 INJECTION INTRAMUSCULAR; INTRAVENOUS at 11:25

## 2020-11-27 NOTE — H&P
[place] Registered Nurse Progress Notes Signed 11/18/2020 1343      Edit Note     Add Interval      Claudetta Dial  Progress Notes Signed 11/18/2020 0815      Edit Note     Add Interval      Lizzette Newell MD Physician Progress Notes Sign when Signing Visit 11/12/2020 1430      Edit Note                                                                Lizzette Newell MD    Physician    Specialty:  Pulmonary Disease         Progress Notes    Sign when Signing Visit         Creation Time:  11/12/20 1418              Related encounter: Office Visit from 11/12/2020 in [facility] and [address] [facility], [address] [place] [address]    [phone]         Patient Name:  Jessica Olsen    YOB: 1950              Office Visit 11/12/2020         CHIEF COMPLAINT:            Chief Complaint       Patient presents with            New Patient            Pneumonia                HISTORY OF PRESENT ILLNESS:           This is a delightful 65-year-old [facility] female who presents for evaluation of possible interstitial lung disease/persistent infiltrates. The patient is not a very good historian but tells me that approximately 10 years ago she was told that she may have pulmonary fibrosis while she was stationed at an [employer] here in [place] [facility]. She is originally from [facility] and moved to the [place] in [place] or 1976. She was recently hospitalized in October for bilateral infiltrates required a 1 week stay at the intensive care unit following which she was transferred to the floor and home on oxygen. She tells me that she uses the oxygen intermittently whenever she needs it at night. She denies any exposure to asbestos or toxic fumes. She does not have a hot tub, no pets. The house that she lives in is a new house with no history of having harbored pets.   She used to be a rice field farmer in [facility] and multiple family members including her mother and sister were apparently diagnosed with tuberculosis however she did not live with them at the time. She had already moved to the [place]. She is unable to tell me how much distance she can walk without getting short of breath at this time. She is a never smoker.                       Past Medical History:       Diagnosis     Date            Arthritis                  Asthma                  Bronchiectasis ([facility]                  Dyslipidemia                  Dyspareunia, female                  Ear problems                  Fatigue                  Gallstones                  Hearing reduced                  Hypertension                  Hypokalemia                  Hypothyroidism                  Osteopenia                  Prediabetes                  Psoriasis                  Vertigo                                        Problem List      Date Reviewed: 7/10/2019                                           Codes       Class       Noted               Acute respiratory failure ([facility] [address]     ICD-10-CM: J96.00    ICD-9-CM: 518.81           10/10/2020                           Elevated AST (SGOT)     ICD-10-CM: R74.01    ICD-9-CM: 790.4           10/7/2020             Overview Addendum 10/10/2020  2:44 PM by Agustina Soria MD                       S/p SAGAR from dehydration    Will monitor    No alcohol use                                          O2 dependent     ICD-10-CM: Z99.81    ICD-9-CM: V46.2           10/7/2020             Overview Signed 10/10/2020  2:42 PM by Agustina Soria MD                       2l now                                     Encounter for screening colonoscopy     ICD-10-CM: Z12.11    ICD-9-CM: V76.51           10/7/2020             Overview Signed 10/10/2020  2:44 PM by Agustina Soria MD                       10/2020                                     Encounter for examination following treatment at hospital     ICD-10-CM: Janna Victoria    ICD-9-CM: V67.9           9/29/2020             Overview Signed 9/29/2020  7:24 PM by Carol Bryson MD                       Records reviewed                                     Pneumonia of both lower lobes due to infectious organism     ICD-10-CM: J18.9    ICD-9-CM: 763           9/29/2020             Overview Addendum 10/10/2020  2:43 PM by Carol Bryson MD                       On cxr 8/2020    Recheck x-ray 9/2020-interstitial d/s    F/u in 4 weeks for recheck cxr                                      Low magnesium level     ICD-10-CM: R79.0    ICD-9-CM: 790.6           9/29/2020             Overview Signed 9/29/2020  7:24 PM by Carol Bryson MD                       On supplement                                     Diastolic dysfunction     PXA-85-HV: I51.89    ICD-9-CM: 429.9           9/29/2020             Overview Addendum 10/10/2020  2:43 PM by Carol Bryson MD                       Seen on echo    Lasix/potassium /BB                                     Current mild episode of major depressive disorder without prior episode [facility])     ICD-10-CM: F32.0    ICD-9-CM: 296.21           9/29/2020             Overview Signed 9/29/2020  7:25 PM by Carol Bryson MD                       Stable on zoloft                                     Absolute anemia     ICD-10-CM: D64.9    ICD-9-CM: 285.9           9/29/2020             Overview Addendum 10/10/2020  2:43 PM by Carol Bryson MD                       Iron def anemia    On supplement    Recheck labs-better    Refer for colonoscopy                                     Low calcium levels     ICD-10-CM: E83.51    ICD-9-CM: 275.41           9/29/2020             Overview Signed 9/29/2020  7:24 PM by Carol Bryson MD                       In hospitl    Recheck labs today                                     Debility     ICD-10-CM: R53.81    ICD-9-CM: 799.3           9/29/2020 Overview Signed 9/29/2020  7:27 PM by Caridad Parmar MD                       PT at home                                     Sensorineural hearing loss (SNHL) of both ears     ICD-10-CM: H90.3    ICD-9-CM: 389.18           3/27/2020                           Chronic kidney disease     ICD-10-CM: N18.9    ICD-9-CM: 585.9           9/23/2019             Overview Signed 9/23/2019  1:07 PM by Caridad Parmar MD                       Avoid NSAID                                     Environmental allergies     ICD-10-CM: Z91.09    ICD-9-CM: V15.09           9/23/2019             Overview Signed 9/23/2019  1:06 PM by Caridad Parmar MD                       As neededmeds                                     Physical exam     ICD-10-CM: Z00.00    ICD-9-CM: V70.9           4/10/2019             Overview Signed 4/10/2019 11:46 AM by Caridad Parmar MD                       Pap 2010 as per pt normal --hysterectomy    Colonoscopy 2011 normal    Mammogram 1/2019    Hep c neg 2017    Tdap/singgles recommended to get it at pharmacy    prevnar 13 4/2019    Eye exam 2019--cataract surgery    Hearing good    PQA-                                     Encounter for immunization     ICD-10-CM: Z23    ICD-9-CM: V03.89           4/10/2019             Overview Signed 4/10/2019  5:50 PM by Caridad Parmar MD                       prevnar 13 on 4/2019                                     ACP (advance care planning)     ICD-10-CM: Z71.89    ICD-9-CM: V65.49           4/10/2019             Overview Signed 4/10/2019  5:50 PM by Caridad Parmar MD                       pts  POA-pt has papers                                     Screening for breast cancer     ICD-10-CM: Z12.39    ICD-9-CM: V76.10           4/10/2019             Overview Signed 4/10/2019  5:50 PM by Caridad Parmar MD                       manual exam 4/2019                                     Allergic rhinitis ICD-10-CM: J30.9    ICD-9-CM: 477.9           1/10/2019             Overview Signed 1/10/2019  7:42 PM by Temitope Osei.MD hood                                     Presbycusis of right ear with unrestricted hearing of left ear     ICD-10-CM: H91.11    ICD-9-CM: 388.01           1/23/2018                           Anxiety     ICD-10-CM: F41.9    ICD-9-CM: 300.00           8/7/2017             Overview Signed 1/10/2019  7:42 PM by Temitope Osei., MD                       Stable on zoloft                                     Gallstones     ICD-10-CM: K80.20    ICD-9-CM: 574.20           Unknown                           Prediabetes     ICD-10-CM: R73.03    ICD-9-CM: 790.29           Unknown             Overview Addendum 9/29/2020  7:27 PM by Temitope Osei., MD                       Off metformin 9/2020                                          Hypothyroidism     ICD-10-CM: E03.9    ICD-9-CM: 244.9           Unknown             Overview Signed 1/10/2019  7:43 PM by Temitope Osei., MD                       Stable on levothyroxine                                     Essential hypertension     ICD-10-CM: I10    ICD-9-CM: 401.9           Unknown             Overview Signed 1/10/2019  7:44 PM by Temitope Osei., MD                       Stable on amlodipine and metoprolol    Stable with med    Refilled    Labs     Annual eye exam recommended    F/u in 3 months                                     Dyslipidemia     ICD-10-CM: E78.5    ICD-9-CM: 272.4           Unknown             Overview Signed 1/10/2019  7:44 PM by Temitope Osei., MD                       On statin                                     Asthma     ICD-10-CM: J45.909    ICD-9-CM: 493.90           Unknown             Overview Signed 1/10/2019  7:45 PM by Temitope Osei., MD                       Intermittent    Albuterol as needed                                     Psoriasis     ICD-10-CM: L40.9    ICD-9-CM: 696.1           Unknown                           Arthritis     ICD-10-CM: M19.90    ICD-9-CM: 716.90           Unknown                           Osteopenia     ICD-10-CM: M85.80    ICD-9-CM: 733.90           Unknown                           Hypokalemia     ICD-10-CM: E87.6    ICD-9-CM: 276.8           Unknown             Overview Signed 10/10/2020  2:44 PM by Elia Loja MD                       Likely sec to lasix    Supplement potassium daily                                     Vertigo     ICD-10-CM: R42    ICD-9-CM: 780.4           Unknown                                                       Past Surgical History:       Procedure     Laterality     Date            HX ABDOMINOPLASTY                        HX HYSTERECTOMY                         Total            HX OTHER SURGICAL                         lung biopsy            HX SALPINGO-OOPHORECTOMY     Bilateral                  HX THYROIDECTOMY                         total                       Social History                    Socioeconomic History            Marital status:                        Spouse name:     Not on file            Number of children:     Not on file            Years of education:     Not on file            Highest education level:     Not on file       Occupational History            Not on file       Social Needs            Financial resource strain:     Not on file            Food insecurity                   Worry:     Not on file                   Inability:     Not on file            Transportation needs                   Medical:     Not on file                   Non-medical:     Not on file       Tobacco Use            Smoking status:     Never Smoker            Smokeless tobacco:     Never Used       Substance and Sexual Activity            Alcohol use:     No            Drug use:     No            Sexual activity:     Never       Lifestyle            Physical activity Days per week:     Not on file                   Minutes per session:     Not on file            Stress:     Not on file       Relationships            Social connections                   Talks on phone:     Not on file                   Gets together:     Not on file                   Attends [employer] service:     Not on file                   Active member of club or organization:     Not on file                   Attends meetings of clubs or organizations:     Not on file                   Relationship status:     Not on file            Intimate partner violence                   Fear of current or ex partner:     Not on file                   Emotionally abused:     Not on file                   Physically abused:     Not on file                   Forced sexual activity:     Not on file       Other Topics     Concern            Not on file       Social History Narrative            Not on file                       Family History       Family history unknown: Yes                             Allergies       Allergen     Reactions            Hydrochlorothiazide     Other (comments)                   hypokalemia            Lisinopril     Cough                            Current Outpatient Medications       Medication     Sig            vit B Cmplx 3-FA-Vit C-Biotin (NEPHRO REDDY RX) 1- mg-mg-mcg tablet     Take 1 Tab by mouth daily.      atorvastatin (LIPITOR) 20 mg tablet     Take 1 Tab by mouth daily. Indications: excessive fat in the blood            ferrous sulfate 325 mg (65 mg iron) tablet     Take 1 Tab by mouth two (2) times a day.      furosemide (LASIX) 40 mg tablet     Take 0.5 Tabs by mouth daily.      levothyroxine (SYNTHROID) 50 mcg tablet     Take 1 Tab by mouth Daily (before breakfast). Indications: a condition with low thyroid hormone levels, New dose            magnesium oxide (MAG-OX) 400 mg tablet     Take 1 Tab by mouth daily.         potassium chloride SR (K-TAB) 20 mEq tablet     Take 1 Tab by mouth daily.      sertraline (ZOLOFT) 100 mg tablet     Take 1 Tab by mouth daily.      metoprolol succinate (TOPROL-XL) 50 mg XL tablet     Take 1 Tab by mouth daily.      amLODIPine (NORVASC) 5 mg tablet     Take 1 Tab by mouth daily. No current facility-administered medications for this visit. REVIEW OF SYSTEMS:                                                Review of Systems     Constitutional: Negative for chills, diaphoresis, fever, malaise/fatigue and weight loss. HENT: Negative for congestion, ear discharge, ear pain, hearing loss, nosebleeds, sinus pain, sore throat and tinnitus. Eyes: Negative for blurred vision, pain and redness. Respiratory: Positive for cough. Negative for hemoptysis, sputum production, shortness of breath and wheezing. Cardiovascular: Negative for chest pain, palpitations, orthopnea, claudication, leg swelling and PND. Gastrointestinal: Positive for diarrhea. Negative for abdominal pain, blood in stool, constipation, heartburn, melena, nausea and vomiting. Genitourinary: Negative for frequency, hematuria and urgency. Musculoskeletal: Negative for back pain, joint pain and neck pain. Skin: Negative for itching and rash. Neurological: Positive for dizziness. Negative for tremors, focal weakness, seizures, weakness and headaches. Endo/Heme/Allergies: Negative for environmental allergies. Does not bruise/bleed easily. Psychiatric/Behavioral: Negative for depression, hallucinations, memory loss and suicidal ideas. The patient is not nervous/anxious.                           PHYSICAL EXAM:         Visit Vitals      BP     135/74 (BP 1 Location: Left arm, BP Patient Position: Sitting)       Pulse     87       Temp     97 °F (36.1 °C) (Temporal)       Resp     16       Ht     5' 1\" (1.549 m)       Wt     120 lb 3.2 oz (54.5 kg)       SpO2     91%       BMI     22.71 kg/m²                            GENERAL APPEARANCE:                The patient is normal weight and in no respiratory distress. HEENT:                PERRL. Conjunctivae unremarkable. Nasal mucosa is without epistaxis, exudate, or polyps. Gums and dentition are unremarkable. There is no oropharyngeal narrowing. TMs are clear. NECK/LYMPHATIC:                Symmetrical with no elevation of jugular venous pulsation. Trachea midline. No thyroid enlargement. No cervical adenopathy. LUNGS:                Normal respiratory effort with symmetrical lung expansion. Breath sounds bilateral rhonchi, crackles, squeaks both inspiratory and expiratory. HEART:                There is a regular rate and rhythm. No murmur, rub, or gallop. There is no edema in the lower extremities. ABDOMEN:                Soft and non-tender. No hepatosplenomegaly. Bowel sounds are normal.                  SKIN:                There are no rashes, cyanosis, jaundice, or ecchymosis present. EXTREMITIES:                The extremities are unremarkable without clubbing, cyanosis, joint inflammation, degenerative, or ischemic change. MUSCULOSKELETAL:                There is no abnormal tone, muscle atrophy, or abnormal movement present. NEURO:                The patient is alert and oriented to person, place, and time. Memory appears intact and mood is normal.  No gross sensorimotor deficits are present.               DIAGNOSTIC TESTS:                                CXR:      untitled image                Spirometry:  11/12/2020    untitled image         Standard exercise oximetry was performed on 12 November 2020:         Baseline oxygen saturation at rest on room air 92%, baseline heart rate 82 bpm.    The patient walked for a total of 2 minutes on room air with a drop in her oxygen saturation to 87%. She was then placed on first 2 L and then 3 L of oxygen via nasal cannula with oxygen saturations improving to 9495% at all times. Her maximum heart rate was 103 bpm.         IMPRESSION:         This is a positive exercise oximetry with evidence of desaturation on exertion on room air requiring 3 L/min via nasal cannula to maintain oxygen saturations with exertion at above 90%. The patient will need oxygen at 3 L/min with exertion.                      ASSESSMENT:  (Medical Decision Making)                                Patient Active Problem List       Diagnosis     Code            Gallstones     K80.20            Prediabetes     R73.03            Hypothyroidism     E03.9            Essential hypertension     I10            Dyslipidemia     E78.5            Asthma     J45.909            Psoriasis     L40.9            Arthritis     M19.90            Osteopenia     M85.80            Hypokalemia     E87.6            Vertigo     R42            Anxiety     F41.9            Presbycusis of right ear with unrestricted hearing of left ear     H91.11            Allergic rhinitis     J30.9            Physical exam     Z00.00            Encounter for immunization     Z23            ACP (advance care planning)     Z71.89            Screening for breast cancer     Z12.39            Chronic kidney disease     N18.9            Environmental allergies     Z91.09            Sensorineural hearing loss (SNHL) of both ears     H90.3            Encounter for examination following treatment at hospital     Z09            Pneumonia of both lower lobes due to infectious organism     J18.9            Low magnesium level     R79.0            Diastolic dysfunction     L59.16            Current mild episode of major depressive disorder without prior episode (HCC)     F32.0            Absolute anemia     D64.9            Low calcium levels     E83.51            Debility R53.81            Elevated AST (SGOT)     R74.01            O2 dependent     Z99.81            Encounter for screening colonoscopy     Z12.11            Acute respiratory failure ([address]     J96.00                          PLAN:           Encounter Diagnosis       Name     Primary?      Pneumonia due to infectious organism, unspecified laterality, unspecified part of lung     Yes       The patient seems to have chronic infiltrates or at least symptoms per the patient's history now seemingly persistent infiltrates could be due to chronic lung disease of unclear etiology, auscultation does not reveal Velcro crackles in current instead a very heterogeneous auscultatory finding with both squeaks, wheezes, rhonchi, crackles. I am not clear or sure of the etiology of these but the patient will need a work-up which will include:         #1 high-resolution CT scan of the chest with inspiratory, expiratory as well as prone and supine imaging    #2  bronchoscopy with bronchoalveolar lavage and targeted transbronchial biopsies based on the results of the CT scan    #3 complete pulmonary function with diffusion capacity         Depending on the findings on the CT scan further work-up may be necessary to include rheumatological disorders however the patient does not exhibit any stigmata of rheumatological disorder to suggest this. IPF is a possibility but for a woman  at a relatively young age of 60 to be diagnosed with IPF although possible is not unusual and again I am not clear from the patient's history whether she was diagnosed with fibrosis or not. This was discussed in detail with her and her son who is present with her. Her exercise oximetry today was positive and the patient requires 3 L via nasal cannula with exertion to maintain oxygen saturation greater than 90%. This denotes chronicity and hopefully with the above planned work-up we will be able to determine etiology.          risks and benefits of bronchoscopy with  transbronchial biopsies and bronchoalveolar lavage were discussed with the patient. this will be scheduled in 2 weeks time after the CT scan and complete pulmonary function have been done. Orders Placed This Encounter            AMB POC SPIROMETRY                                     Ronit Martinez MD         Dictated using voice recognition software. Proofread, but unrecognized voice recognition errors may exist.                        Date of Surgery Update: Marin Hopkins was seen and examined. History and physical has been reviewed. The patient has been examined.  There have been no significant clinical changes since the completion of the originally dated History and Physical.    Signed By: Ronit Martinez MD     November 27, 2020 11:22 AM

## 2020-11-27 NOTE — DISCHARGE INSTRUCTIONS
RESPIRATORY CARE - BRONCHOSCOPY - DISCHARGE INSTRUCTIONS      You received a lot of numbing medication for your throat and nose, and you also received medication to make you sleepy during your procedure. Because of this and because the bronchoscopy may have irritated your airways, we ask that you follow these directions:    1. Do not eat or drink until  1230 . After that, you may have what you please. You may want to try some liquids first, because your throat may be a little sore. 2. Medication may cause drowsiness for several hours, therefore:  · Do not drive or operate machinery for remainder of the day. · No alcohol today  · Do not make any important or legal decisions for 24 hours  · Do not sign any legal documents for 24 hours    3. You may cough up more mucus than usual and you may see some blood, but                   this is expected and should subside by the following day. 4. If severe throat irritation, coughing, or bleeding continue, call your doctor. 5.         If you run a fever greater than 102, call [facility] at [phone]. Discharge Medications:  {Medication reconciliation information is now added to the patient's AVS automatically when it is printed. There is no need to use this SmartLink in discharge instructions.   Highlight this text and delete it to clear this message}

## 2020-11-27 NOTE — PROCEDURES
PROCEDURE    Bronchoscopy with airway inspection, BAL and TBBX. INDICATION   Abnormal chest xr    EQUIPMENT:  Olympus T 180 Bronchhoscope. Radial Jaw 4 forceps    ANESTHESIA    Concious sedation with: Fentanyl 100 mcg IV; Versed 2 mg IV; Lidocaine 180 mg to tracheo-bronchial tree and vocal cords. AIRWAY INSPECTION    After obtaining informed consent, using a bite block, an OlympusT 180 video bronchoscope was  introduced into the trachea through the vocal chords, without complication. RIGHT    LOCATION NORM/ABNORM DESCRIPTION   VOCAL CORDS NL    TRACHEA NL    REED NL    RMSB NL    RUL NL    BI NL    RML NL    SUP SEGM RLL NL    MED BASAL NL    ANTERIOR BASAL NL    LATERAL BASAL NL    POSTERIOR BASAL NL                                              LEFT    LOCATION NORMAL/ABNORMAL TYPE   LMSB NL    KRISTAL NL    LINGULA NL    SUPERIOR DIVISION NL    SUPERIOR SEG LLL NL    MOIRA-MEDIAL LLL NL    LATERAL LLL NL    POSTERIOR LLL NL      The following samples were obtained:    BAL: Lingula    TBBX: Anterior KRISTAL x 1, Lingula x 2, Lateral basal LLL x 2    Samples were sent for:  TBBX- histology  BAL:  Cell ct with difff  AFB  Fungus  Routine cultures  Cytology  CD4:8    The procedure was completed  without complication and the patient tolerated the procedure well.     EBL: 2 ml    Recommendations:  Await bronchoscopy results  Follow up in the office as scheduled    Romelia Will MD

## 2020-11-30 LAB
BACTERIA SPEC CULT: ABNORMAL
BACTERIA SPEC CULT: ABNORMAL
GRAM STN SPEC: ABNORMAL
SERVICE CMNT-IMP: ABNORMAL

## 2020-12-02 ENCOUNTER — APPOINTMENT (RX ONLY)
Dept: URBAN - METROPOLITAN AREA CLINIC 349 | Facility: CLINIC | Age: 70
Setting detail: DERMATOLOGY
End: 2020-12-02

## 2020-12-02 DIAGNOSIS — L40.0 PSORIASIS VULGARIS: ICD-10-CM | Status: WELL CONTROLLED

## 2020-12-02 PROBLEM — I10 ESSENTIAL (PRIMARY) HYPERTENSION: Status: ACTIVE | Noted: 2020-12-02

## 2020-12-02 PROCEDURE — ? TREATMENT REGIMEN

## 2020-12-02 PROCEDURE — ? COUNSELING

## 2020-12-02 PROCEDURE — ? PRESCRIPTION

## 2020-12-02 PROCEDURE — 99213 OFFICE O/P EST LOW 20 MIN: CPT

## 2020-12-02 RX ORDER — FLUOCINOLONE ACETONIDE 0.11 MG/ML
OIL TOPICAL
Qty: 1 | Refills: 6 | Status: ERX

## 2020-12-02 RX ORDER — CALCIPOTRIENE AND BETAMETHASONE DIPROPIONATE 50; .5 UG/G; MG/G
SUSPENSION TOPICAL
Qty: 1 | Refills: 6 | Status: ERX

## 2020-12-02 ASSESSMENT — LOCATION DETAILED DESCRIPTION DERM
LOCATION DETAILED: LEFT SUPERIOR PARIETAL SCALP
LOCATION DETAILED: LEFT KNEE
LOCATION DETAILED: LEFT ELBOW
LOCATION DETAILED: RIGHT KNEE
LOCATION DETAILED: RIGHT LATERAL ELBOW

## 2020-12-02 ASSESSMENT — LOCATION ZONE DERM
LOCATION ZONE: LEG
LOCATION ZONE: ARM
LOCATION ZONE: SCALP

## 2020-12-02 ASSESSMENT — LOCATION SIMPLE DESCRIPTION DERM
LOCATION SIMPLE: SCALP
LOCATION SIMPLE: RIGHT KNEE
LOCATION SIMPLE: RIGHT ELBOW
LOCATION SIMPLE: LEFT KNEE
LOCATION SIMPLE: LEFT ELBOW

## 2020-12-02 NOTE — PROCEDURE: TREATMENT REGIMEN
Action 3: Continue
Continue Regimen: Continue taclonex on patches twice daily x 2 wks then once daily as needed\\nFluocinonolone scalp oil as needed for itching scaling spots on scalp
Detail Level: Zone

## 2020-12-09 LAB
CD3 CELLS # SPEC: 61 %
CD3+CD4+ CELLS # BLD: 21 %
CD4:CD8 RATIO, C48RBT: 0.54 RATIO
CD8, CD8BT: 39 %
SPECIMEN SOURCE: NORMAL

## 2020-12-10 PROBLEM — I95.0 IDIOPATHIC HYPOTENSION: Status: RESOLVED | Noted: 2020-11-20 | Resolved: 2020-12-10

## 2020-12-15 ENCOUNTER — TRANSCRIBE ORDER (OUTPATIENT)
Dept: SCHEDULING | Age: 70
End: 2020-12-15

## 2020-12-15 DIAGNOSIS — D50.9 IRON DEFICIENCY ANEMIA, UNSPECIFIED: Primary | ICD-10-CM

## 2020-12-15 DIAGNOSIS — R10.12 LEFT UPPER QUADRANT PAIN: ICD-10-CM

## 2020-12-18 ENCOUNTER — HOSPITAL ENCOUNTER (OUTPATIENT)
Dept: GENERAL RADIOLOGY | Age: 70
Discharge: HOME | End: 2020-12-18
Attending: INTERNAL MEDICINE
Payer: OTHER GOVERNMENT

## 2020-12-18 DIAGNOSIS — D50.9 IRON DEFICIENCY ANEMIA, UNSPECIFIED: ICD-10-CM

## 2020-12-18 DIAGNOSIS — R10.12 LEFT UPPER QUADRANT PAIN: ICD-10-CM

## 2020-12-18 PROCEDURE — 74011000255 HC RX REV CODE- 255: Performed by: INTERNAL MEDICINE

## 2020-12-18 PROCEDURE — 74250 X-RAY XM SM INT 1CNTRST STD: CPT

## 2020-12-18 RX ADMIN — BARIUM SULFATE 600 ML: 240 SUSPENSION ORAL at 09:56

## 2020-12-20 PROBLEM — Z23 NEEDS FLU SHOT: Status: RESOLVED | Noted: 2020-11-20 | Resolved: 2020-12-20

## 2020-12-29 LAB
FUNGUS CULTURE, RFCO2T: NORMAL
FUNGUS SMEAR, RFCO1T: NORMAL
FUNGUS SPEC CULT: NORMAL
FUNGUS STAIN, 188244: NORMAL
REFLEX TO ID, RFCO3T: NORMAL
SPECIMEN SOURCE: NORMAL
SPECIMEN SOURCE: NORMAL

## 2021-01-10 LAB
ACID FAST STN SPEC: NEGATIVE
MYCOBACTERIUM SPEC QL CULT: NEGATIVE
SPECIMEN PREPARATION: NORMAL
SPECIMEN SOURCE: NORMAL

## 2021-03-04 PROBLEM — J47.9 BRONCHIECTASIS WITHOUT COMPLICATION (HCC): Status: ACTIVE | Noted: 2021-03-04

## 2021-03-04 PROBLEM — J67.9 HYPERSENSITIVITY PNEUMONITIS (HCC): Status: ACTIVE | Noted: 2021-03-04

## 2021-03-04 PROBLEM — J96.11 CHRONIC RESPIRATORY FAILURE WITH HYPOXIA (HCC): Status: ACTIVE | Noted: 2021-03-04

## 2021-04-01 PROBLEM — Z86.19 H/O SEPTIC SHOCK: Status: ACTIVE | Noted: 2020-09-02

## 2021-04-01 PROBLEM — I50.30 HEART FAILURE WITH PRESERVED EJECTION FRACTION (HCC): Status: ACTIVE | Noted: 2020-09-02

## 2021-04-01 PROBLEM — K52.9 GASTROENTERITIS: Status: ACTIVE | Noted: 2020-08-15

## 2021-04-01 PROBLEM — A41.9 SEPTIC SHOCK (HCC): Status: ACTIVE | Noted: 2020-08-15

## 2021-04-01 PROBLEM — R91.8 PULMONARY INFILTRATE ON CHEST X-RAY: Status: ACTIVE | Noted: 2020-08-17

## 2021-04-01 PROBLEM — D69.6 THROMBOCYTOPENIA (HCC): Status: ACTIVE | Noted: 2020-08-17

## 2021-04-01 PROBLEM — N17.9 AKI (ACUTE KIDNEY INJURY) (HCC): Status: ACTIVE | Noted: 2020-08-15

## 2021-04-01 PROBLEM — E83.39 HYPOPHOSPHATEMIA: Status: ACTIVE | Noted: 2020-08-22

## 2021-04-01 PROBLEM — R65.21 SEPTIC SHOCK (HCC): Status: ACTIVE | Noted: 2020-08-15

## 2021-04-01 PROBLEM — E87.1 HYPONATREMIA: Status: ACTIVE | Noted: 2020-09-02

## 2021-04-01 PROBLEM — R78.81 BACTEREMIA DUE TO STREPTOCOCCUS: Status: ACTIVE | Noted: 2020-09-02

## 2021-04-01 PROBLEM — Z86.39 H/O HASHIMOTO THYROIDITIS: Status: ACTIVE | Noted: 2020-08-15

## 2021-04-01 PROBLEM — E11.9 TYPE 2 DIABETES MELLITUS WITHOUT COMPLICATION, WITHOUT LONG-TERM CURRENT USE OF INSULIN (HCC): Status: ACTIVE | Noted: 2020-08-15

## 2021-04-01 PROBLEM — E87.20 METABOLIC ACIDOSIS: Status: ACTIVE | Noted: 2020-08-17

## 2021-04-01 PROBLEM — B95.5 BACTEREMIA DUE TO STREPTOCOCCUS: Status: ACTIVE | Noted: 2020-09-02

## 2021-04-06 ENCOUNTER — ANESTHESIA EVENT (OUTPATIENT)
Dept: ENDOSCOPY | Age: 71
End: 2021-04-06
Payer: OTHER GOVERNMENT

## 2021-04-07 ENCOUNTER — ANESTHESIA (OUTPATIENT)
Dept: ENDOSCOPY | Age: 71
End: 2021-04-07
Payer: OTHER GOVERNMENT

## 2021-04-07 ENCOUNTER — HOSPITAL ENCOUNTER (OUTPATIENT)
Age: 71
Setting detail: OUTPATIENT SURGERY
Discharge: HOME | End: 2021-04-07
Attending: INTERNAL MEDICINE | Admitting: INTERNAL MEDICINE
Payer: OTHER GOVERNMENT

## 2021-04-07 ENCOUNTER — APPOINTMENT (OUTPATIENT)
Dept: GENERAL RADIOLOGY | Age: 71
End: 2021-04-07
Attending: INTERNAL MEDICINE
Payer: OTHER GOVERNMENT

## 2021-04-07 VITALS
BODY MASS INDEX: 27.38 KG/M2 | WEIGHT: 145 LBS | OXYGEN SATURATION: 94 % | RESPIRATION RATE: 16 BRPM | DIASTOLIC BLOOD PRESSURE: 86 MMHG | HEART RATE: 70 BPM | HEIGHT: 61 IN | TEMPERATURE: 97.6 F | SYSTOLIC BLOOD PRESSURE: 178 MMHG

## 2021-04-07 LAB
GLUCOSE BLD STRIP.AUTO-MCNC: 87 MG/DL (ref 65–100)
SERVICE CMNT-IMP: NORMAL

## 2021-04-07 PROCEDURE — 77030039425 HC BLD LARYNG TRULITE DISP TELE -A: Performed by: ANESTHESIOLOGY

## 2021-04-07 PROCEDURE — 77030037088 HC TUBE ENDOTRACH ORAL NSL COVD-A: Performed by: ANESTHESIOLOGY

## 2021-04-07 PROCEDURE — 77030011761 HC SPHNTOM BILI BSC -C: Performed by: INTERNAL MEDICINE

## 2021-04-07 PROCEDURE — 74011250636 HC RX REV CODE- 250/636: Performed by: ANESTHESIOLOGY

## 2021-04-07 PROCEDURE — 74011250636 HC RX REV CODE- 250/636: Performed by: NURSE ANESTHETIST, CERTIFIED REGISTERED

## 2021-04-07 PROCEDURE — 74011000250 HC RX REV CODE- 250: Performed by: NURSE ANESTHETIST, CERTIFIED REGISTERED

## 2021-04-07 PROCEDURE — 77030036655 HC CATH ENDOSC ACC DEL SPYSCP BSC -H1: Performed by: INTERNAL MEDICINE

## 2021-04-07 PROCEDURE — 76040000026: Performed by: INTERNAL MEDICINE

## 2021-04-07 PROCEDURE — 74011000636 HC RX REV CODE- 636: Performed by: INTERNAL MEDICINE

## 2021-04-07 PROCEDURE — 77030040361 HC SLV COMPR DVT MDII -B: Performed by: INTERNAL MEDICINE

## 2021-04-07 PROCEDURE — 2709999900 HC NON-CHARGEABLE SUPPLY: Performed by: INTERNAL MEDICINE

## 2021-04-07 PROCEDURE — 82962 GLUCOSE BLOOD TEST: CPT

## 2021-04-07 PROCEDURE — 77030009038 HC CATH BILI STN RTVR BSC -C: Performed by: INTERNAL MEDICINE

## 2021-04-07 PROCEDURE — 74011250637 HC RX REV CODE- 250/637: Performed by: INTERNAL MEDICINE

## 2021-04-07 PROCEDURE — 77030012595 HC SPHNTOM BILI BSC -D: Performed by: INTERNAL MEDICINE

## 2021-04-07 PROCEDURE — C1769 GUIDE WIRE: HCPCS | Performed by: INTERNAL MEDICINE

## 2021-04-07 PROCEDURE — 74011250636 HC RX REV CODE- 250/636: Performed by: INTERNAL MEDICINE

## 2021-04-07 PROCEDURE — 77030007288 HC DEV LOK BILI BSC -A: Performed by: INTERNAL MEDICINE

## 2021-04-07 PROCEDURE — 74330 X-RAY BILE/PANC ENDOSCOPY: CPT

## 2021-04-07 PROCEDURE — 76060000033 HC ANESTHESIA 1 TO 1.5 HR: Performed by: INTERNAL MEDICINE

## 2021-04-07 RX ORDER — FLUMAZENIL 0.1 MG/ML
0.2 INJECTION INTRAVENOUS
Status: DISCONTINUED | OUTPATIENT
Start: 2021-04-07 | End: 2021-04-07 | Stop reason: HOSPADM

## 2021-04-07 RX ORDER — ACETAMINOPHEN 325 MG/1
1000 TABLET ORAL ONCE
Status: DISCONTINUED | OUTPATIENT
Start: 2021-04-07 | End: 2021-04-07 | Stop reason: HOSPADM

## 2021-04-07 RX ORDER — ROCURONIUM BROMIDE 10 MG/ML
INJECTION, SOLUTION INTRAVENOUS AS NEEDED
Status: DISCONTINUED | OUTPATIENT
Start: 2021-04-07 | End: 2021-04-07 | Stop reason: HOSPADM

## 2021-04-07 RX ORDER — SODIUM CHLORIDE 0.9 % (FLUSH) 0.9 %
5-40 SYRINGE (ML) INJECTION EVERY 8 HOURS
Status: DISCONTINUED | OUTPATIENT
Start: 2021-04-07 | End: 2021-04-07 | Stop reason: HOSPADM

## 2021-04-07 RX ORDER — EPHEDRINE SULFATE/0.9% NACL/PF 50 MG/5 ML
SYRINGE (ML) INTRAVENOUS AS NEEDED
Status: DISCONTINUED | OUTPATIENT
Start: 2021-04-07 | End: 2021-04-07 | Stop reason: HOSPADM

## 2021-04-07 RX ORDER — SODIUM CHLORIDE 0.9 % (FLUSH) 0.9 %
5-40 SYRINGE (ML) INJECTION AS NEEDED
Status: DISCONTINUED | OUTPATIENT
Start: 2021-04-07 | End: 2021-04-07 | Stop reason: HOSPADM

## 2021-04-07 RX ORDER — SODIUM CHLORIDE, SODIUM LACTATE, POTASSIUM CHLORIDE, CALCIUM CHLORIDE 600; 310; 30; 20 MG/100ML; MG/100ML; MG/100ML; MG/100ML
100 INJECTION, SOLUTION INTRAVENOUS CONTINUOUS
Status: DISCONTINUED | OUTPATIENT
Start: 2021-04-07 | End: 2021-04-07 | Stop reason: HOSPADM

## 2021-04-07 RX ORDER — DEXAMETHASONE SODIUM PHOSPHATE 4 MG/ML
INJECTION, SOLUTION INTRA-ARTICULAR; INTRALESIONAL; INTRAMUSCULAR; INTRAVENOUS; SOFT TISSUE AS NEEDED
Status: DISCONTINUED | OUTPATIENT
Start: 2021-04-07 | End: 2021-04-07 | Stop reason: HOSPADM

## 2021-04-07 RX ORDER — OXYCODONE HYDROCHLORIDE 5 MG/1
10 TABLET ORAL
Status: DISCONTINUED | OUTPATIENT
Start: 2021-04-07 | End: 2021-04-07 | Stop reason: HOSPADM

## 2021-04-07 RX ORDER — PROPOFOL 10 MG/ML
INJECTION, EMULSION INTRAVENOUS AS NEEDED
Status: DISCONTINUED | OUTPATIENT
Start: 2021-04-07 | End: 2021-04-07 | Stop reason: HOSPADM

## 2021-04-07 RX ORDER — NALOXONE HYDROCHLORIDE 0.4 MG/ML
0.2 INJECTION, SOLUTION INTRAMUSCULAR; INTRAVENOUS; SUBCUTANEOUS AS NEEDED
Status: DISCONTINUED | OUTPATIENT
Start: 2021-04-07 | End: 2021-04-07 | Stop reason: HOSPADM

## 2021-04-07 RX ORDER — ONDANSETRON 2 MG/ML
INJECTION INTRAMUSCULAR; INTRAVENOUS AS NEEDED
Status: DISCONTINUED | OUTPATIENT
Start: 2021-04-07 | End: 2021-04-07 | Stop reason: HOSPADM

## 2021-04-07 RX ORDER — SUCCINYLCHOLINE CHLORIDE 20 MG/ML
INJECTION INTRAMUSCULAR; INTRAVENOUS AS NEEDED
Status: DISCONTINUED | OUTPATIENT
Start: 2021-04-07 | End: 2021-04-07 | Stop reason: HOSPADM

## 2021-04-07 RX ORDER — DIPHENHYDRAMINE HYDROCHLORIDE 50 MG/ML
12.5 INJECTION, SOLUTION INTRAMUSCULAR; INTRAVENOUS
Status: DISCONTINUED | OUTPATIENT
Start: 2021-04-07 | End: 2021-04-07 | Stop reason: HOSPADM

## 2021-04-07 RX ORDER — LIDOCAINE HYDROCHLORIDE 20 MG/ML
INJECTION, SOLUTION EPIDURAL; INFILTRATION; INTRACAUDAL; PERINEURAL AS NEEDED
Status: DISCONTINUED | OUTPATIENT
Start: 2021-04-07 | End: 2021-04-07 | Stop reason: HOSPADM

## 2021-04-07 RX ORDER — OXYCODONE HYDROCHLORIDE 5 MG/1
5 TABLET ORAL
Status: DISCONTINUED | OUTPATIENT
Start: 2021-04-07 | End: 2021-04-07 | Stop reason: HOSPADM

## 2021-04-07 RX ORDER — HYDROMORPHONE HYDROCHLORIDE 1 MG/ML
0.5 INJECTION, SOLUTION INTRAMUSCULAR; INTRAVENOUS; SUBCUTANEOUS
Status: DISCONTINUED | OUTPATIENT
Start: 2021-04-07 | End: 2021-04-07 | Stop reason: HOSPADM

## 2021-04-07 RX ADMIN — Medication 10 MG: at 13:38

## 2021-04-07 RX ADMIN — Medication 10 MG: at 13:40

## 2021-04-07 RX ADMIN — INDOMETHACIN 100 MG: 50 SUPPOSITORY RECTAL at 14:24

## 2021-04-07 RX ADMIN — IOPAMIDOL 15 ML: 755 INJECTION, SOLUTION INTRAVENOUS at 14:20

## 2021-04-07 RX ADMIN — GLUCAGON HYDROCHLORIDE 0.25 MG: 1 INJECTION, POWDER, FOR SOLUTION INTRAMUSCULAR; INTRAVENOUS; SUBCUTANEOUS at 13:51

## 2021-04-07 RX ADMIN — SODIUM CHLORIDE, SODIUM LACTATE, POTASSIUM CHLORIDE, AND CALCIUM CHLORIDE: 600; 310; 30; 20 INJECTION, SOLUTION INTRAVENOUS at 13:11

## 2021-04-07 RX ADMIN — GLUCAGON HYDROCHLORIDE 0.25 MG: 1 INJECTION, POWDER, FOR SOLUTION INTRAMUSCULAR; INTRAVENOUS; SUBCUTANEOUS at 13:59

## 2021-04-07 RX ADMIN — HYDROMORPHONE HYDROCHLORIDE 0.5 MG: 1 INJECTION, SOLUTION INTRAMUSCULAR; INTRAVENOUS; SUBCUTANEOUS at 14:43

## 2021-04-07 RX ADMIN — Medication 10 MG: at 13:58

## 2021-04-07 RX ADMIN — PROPOFOL 40 MG: 10 INJECTION, EMULSION INTRAVENOUS at 13:50

## 2021-04-07 RX ADMIN — SUCCINYLCHOLINE CHLORIDE 130 MG: 20 INJECTION, SOLUTION INTRAMUSCULAR; INTRAVENOUS at 13:23

## 2021-04-07 RX ADMIN — GLUCAGON HYDROCHLORIDE 0.25 MG: 1 INJECTION, POWDER, FOR SOLUTION INTRAMUSCULAR; INTRAVENOUS; SUBCUTANEOUS at 13:56

## 2021-04-07 RX ADMIN — PROPOFOL 160 MG: 10 INJECTION, EMULSION INTRAVENOUS at 13:23

## 2021-04-07 RX ADMIN — ONDANSETRON 4 MG: 2 INJECTION INTRAMUSCULAR; INTRAVENOUS at 13:36

## 2021-04-07 RX ADMIN — ROCURONIUM BROMIDE 5 MG: 10 INJECTION, SOLUTION INTRAVENOUS at 13:23

## 2021-04-07 RX ADMIN — Medication 10 MG: at 14:03

## 2021-04-07 RX ADMIN — DEXAMETHASONE SODIUM PHOSPHATE 4 MG: 4 INJECTION, SOLUTION INTRAMUSCULAR; INTRAVENOUS at 13:36

## 2021-04-07 RX ADMIN — LIDOCAINE HYDROCHLORIDE 80 MG: 20 INJECTION, SOLUTION EPIDURAL; INFILTRATION; INTRACAUDAL; PERINEURAL at 13:23

## 2021-04-07 NOTE — INTERVAL H&P NOTE
Update History & Physical 
 
The Patient's History and Physical of March 16, 2021 
 was reviewed with the patient and I examined the patient. There was no change. The surgical site was confirmed by the patient and me. Plan:  The risk, benefits, expected outcome, and alternative to the recommended procedure have been discussed with the patient. Patient understands and wants to proceed with the procedure.  
 
Electronically signed by Ian Cantu MD on 4/7/2021 at 1:07 PM

## 2021-04-07 NOTE — ANESTHESIA PREPROCEDURE EVALUATION
Relevant Problems   RESPIRATORY SYSTEM   (+) Asthma   (+) H/O septic shock   (+) Pneumonia of both lower lobes due to infectious organism      NEUROLOGY   (+) Current mild episode of major depressive disorder without prior episode (HCC)      CARDIOVASCULAR   (+) Essential hypertension      RENAL FAILURE   (+) SAGAR (acute kidney injury) ([address]   (+) Chronic kidney disease      ENDOCRINE   (+) Arthritis   (+) Hypothyroidism   (+) Type 2 diabetes mellitus without complication, without long-term current use of insulin (HCC)      HEMATOLOGY   (+) Absolute anemia       Anesthetic History   No history of anesthetic complications            Review of Systems / Medical History  Patient summary reviewed and pertinent labs reviewed    Pulmonary                Comments: Note from pulmonology detailing what may be hypersensitivity pneumonitis    Neuro/Psych         Psychiatric history     Cardiovascular    Hypertension              Exercise tolerance: <4 METS  Comments: ECHO '20 - preserved EF with grade 2 diastolic dysfunction   GI/Hepatic/Renal                Endo/Other    Diabetes: type 2  Hypothyroidism  Arthritis     Other Findings   Comments: /Bronchiectasis in the past  Pneumonia with respiratory failure 9/20  Oxygen at home as needed          Physical Exam    Airway  Mallampati: II  TM Distance: > 6 cm  Neck ROM: normal range of motion   Mouth opening: Normal     Cardiovascular    Rhythm: regular  Rate: normal         Dental  No notable dental hx       Pulmonary    :bibasilar      Rales:bilateral  Prolonged expiration     Abdominal         Other Findings   Comments: Sat 92% on room air         Anesthetic Plan    ASA: 4  Anesthesia type: general          Induction: Intravenous  Anesthetic plan and risks discussed with: Patient

## 2021-04-07 NOTE — H&P
Patient: Chata Doan  YOB: 1950   Date:                        03/16/2021 8:45 AM   Visit Type:                  Office Visit  Provider:   Sarah Campos MD  Referring Provider:  Bright Oropeza MD [address] [place] [address]  Primary Care Provider: Bright Oropeza MD [facility]   This 66year old  patient was referred by Bright Oropeza MD.  This 66year old female presents for abnormal MRCP and elevated LFTs. History of Present Illness:  1.  abnormal MRCP   2.  elevated LFTs   Mrs. Carolyn Carrington is a 66year old female known to Dr. Alex Richards who presents for follow up of abnormal MRCP. She was last seen by Dr. Alex Richards on 2/25/21 and was recommended to check labs and schedule MRI. Labs 2/25/21: CMP unremarkable except alk phos 147, , AST 58, creatinine 1.16, GFR 47, glucose 114. Abd MRI 3/10/21: gallstones. Extrahepatic biliary duct measures 7.8mm which is considered to be within normal limits. There is a focal stricture seen in the proximal common bile duct. No mass lesion seen within the liver. Cardiomegaly. Mild opacity seen in the lungs bilaterally. Patient is accompanied by her son today. She endorses post-prandial upper abdominal pain. No jaundice, itching, or dark urine. Weight is up 9 lbs since last OV.     Past Medical/Surgical History:   (Detailed)  Disease/disorder Onset Date Management Date Comments   Hypothyroidism       Psoriasis       Osteopenia       Vertigo       Allergic rhinitis       Chronic kidney disease       Environmental allergies       Sensorineural hearing loss of both ears       Hx of pneumonia of both lower lobes d/t infectious organism       Hypokalemia       Debility       Diastolic dysfunction       Anxiety       Asthma       Cholelithiasis/Gallstones       Depression       Diabetes       Hypertension         Abdominoplasty       ARACELI       Lung biopsy       Salpingo-oophorectomy Thyroidectomy     Dyslipidemia         Family History:  (Detailed)  Relationship Family Member Name  Age at Death Condition Onset Age Cause of Death       No family history of Liver disease  N       Social History:  (Detailed)  Preferred language is [place]. Tobacco use status: Current non-smoker. Smoking status: Never smoker. ALCOHOL  There is no history of alcohol use. CAFFEINE  The patient uses caffeine: energy drinks and coffee. [employer] COMMENTS  +ear piercings. No history of blood transfusions, tattoos, IVDA, or Hep A/B vaccinations. Current Medications:  Medication Directions   amlodipine 5 mg tablet take 1 tablet by oral route  every day   atorvastatin 20 mg tablet take 1 tablet by oral route  every day   vitamin B complex tablet TAKE 1 TABLET DAILY   Calcium 500 With D 500 mg (1,250 mg)-400 unit tablet take my mouth once daily   ferrous sulfate 325 mg (65 mg iron) tablet,delayed release TAKE 1 TABLET TWICE DAILY   levothyroxine 50 mcg tablet take 1 tablet by oral route  every day   magnesium 400 mg (as magnesium oxide) tablet TAKE 1 TABLET DAILY   metoprolol succinate ER 25 mg tablet,extended release 24 hr take 1/2 tablet daily   prednisone take 1 tablet by oral route  every day   sertraline 100 mg tablet take 1 tablet by oral route  every day   Ventolin HFA 90 mcg/actuation aerosol inhaler USE AS NEEDED     Allergies:  Ingredient Reaction (Severity) Medication Name Comment   NO KNOWN ALLERGIES      Reviewed, no changes. Review of Systems:  System Neg/Pos Details   ENMT Negative Hearing deficit. Eyes Negative Vision changes. Respiratory Negative Dyspnea. Cardio Negative Chest pain and Irregular heartbeat/palpitations. GI Positive Abdominal pain.  Negative Dysuria and Urinary frequency. Endocrine Negative Cold intolerance and Heat intolerance. Neuro Negative Confusion/disorientation, Dizziness, Headache and Seizures. Hema/Lymph Negative Easy bleeding.    All other review of systems are negative. Vital Signs:  BP mm/Hg Pulse Resp Pulse Ox Temp F Ht (Total in.) Weight (lbs.) Weight (oz.) BMI   122/68 74 16  97.50 61.00 140.60  26.57     Physical Exam:  Weight has remained stable. Overweight. Patient is healthy-appearing in no acute distress. alert and oriented  l- clear  cor- rrr    Assessment/Plan:  # Detail Type Description    1. Assessment Dilated bile duct (K83.8). 2. Assessment Elevated LFTs (R79.89). 3. Assessment Abnormal magnetic resonance cholangiopancreatography (MRCP) (R93.3). Provider Plan Patient has vague symptoms. Known gallstones. Abnormal MRI with a stricture distal to the cystic duct origin. Is not jaundice. Consider malignancy or stone related stricture versus artifact. Discuss with patient and her son the indication, risk and options. We will proceed with ERCP with possible spyglass. Plan Orders Further evaluations ordered today include ERCP to be performed, Next Lab Date is on 04/07/2021. Counseling / Educational Factors:  Items reviewed / discussed during today's visit: prior testing / procedures were reviewed; testing was discussed in detail; old records were reviewed; indications and risks of the procedure were discussed; prescription medication usage was discussed; symptomatic care was discussed; advised to continue current treatment; reassurance of findings given; patient instructed to call for results of diagnostic testing. Patient expressed understanding of instructions. The patient was checked out at 9:00 AM by Leena Johnson. I personally performed the services described in this documentation. Gold Metz (Rebecca) assisted in my presence with documentation, which I have reviewed and validated. Provider:    Joan Bradshaw  03/16/2021 9:51 AM   Document generated by: Nikki Rueda MD 03/16/2021    CC Providers:  Sha Christy MD   [address] [address]-      Electronically signed by Portia Bach MD on 03/16/2021 09:51 AM

## 2021-04-07 NOTE — PROCEDURES
ENDOSCOPIC  RETROGRADE CHOLANGIOPANCREATOGRAPHY    DATE of PROCEDURE: 4/7/2021    PT NAME: Tiana Landon     [id number]    MEDICATION: Glucagon 0.5 mg iv    INSTRUMENT:  CCV084 VF    SPECIAL PROCEDURE:papillotomy w/ balloon sweep - 12 mm balloon; spyglass  BLOOD LOSS- 0 to min. SPEC- no  IMPLANT- none    PROCEDURE: After informed consent, the patient was placed under anesthesia in the semi-prone position. The duodenoscope was passed without difficulty to the area of the ampulla. A standard cannulation and ERCP was performed with the findings as outlined and described below. Patient tolerated the procedure well. ASSESSMENT:  1. Atypical narrowing just above cystic duct which expanded easily with balloon passage- spyglass revealed a normal CBD and CHD with patent cystic duct- artifact on MRCP  2. Pancreas normal   3. GB - stones present; cystic duct patent    PLAN:  1.  Follow up with Dr. Paul Treviño MD

## 2021-04-07 NOTE — DISCHARGE INSTRUCTIONS
Endoscopic Retrograde Cholangiopancreatogram (ERCP): What to Expect at [facility]  After you have an endoscopic retrograde cholangiopancreatogram (ERCP). You will be able to go home after your doctor or a nurse checks to make sure you are not having any problems. If you stay in the hospital overnight, you may go home the next day. You may have a sore throat for a day or two after the procedure. This care sheet gives you a general idea about how long it will take for you to recover. But each person recovers at a different pace. Follow the steps below to get better as quickly as possible. How can you care for yourself at home? Activity  · Rest as much as you need to after you go home. · You should be able to go back to your usual activities the day after the procedure. Diet  · Follow your doctor's directions for eating after the procedure. · Drink plenty of fluids (unless your doctor tells you not to). Medicines  · If you have a sore throat the next day, use an over-the-counter spray to numb your throat. Follow-up care is a key part of your treatment and safety. Be sure to make and go to all appointments, and call your doctor if you are having problems. Its also a good idea to know your test results and keep a list of the medicines you take. When should you call for help? Call 911 anytime you think you may need emergency care. For example, call if:  · You vomit blood or what looks like coffee grounds. · You pass maroon or bloody stools. Call your doctor now or go to the emergency room if:  · You have trouble swallowing. · You have belly pain. · Your stools are black and tarlike or have streaks of blood. · You are sick to your stomach and cannot drink fluids. · You have a fever. · You have pain that does not get better after you take your pain medicine. Watch closely for changes in your health, and be sure to contact your doctor if:  · Your throat still hurts after a day or two.   You do not get better as expected. After general anesthesia or intravenous sedation, for 24 hours or while taking prescription Narcotics:  · Limit your activities  · A responsible adult needs to be with you for the next 24 hours  · Do not drive and operate hazardous machinery  · Do not make important personal or business decisions  · Do not drink alcoholic beverages  · If you have not urinated within 8 hours after discharge, and you are experiencing discomfort from urinary retention, please go to the nearest ED. · If you have sleep apnea and have a CPAP machine, please use it for all naps and sleeping. · Please use caution when taking narcotics and any of your home medications that may cause drowsiness. *  Please give a list of your current medications to your Primary Care Provider. *  Please update this list whenever your medications are discontinued, doses are      changed, or new medications (including over-the-counter products) are added. *  Please carry medication information at all times in case of emergency situations. These are general instructions for a healthy lifestyle:  No smoking/ No tobacco products/ Avoid exposure to second hand smoke  Surgeon General's Warning:  Quitting smoking now greatly reduces serious risk to your health. Obesity, smoking, and sedentary lifestyle greatly increases your risk for illness  A healthy diet, regular physical exercise & weight monitoring are important for maintaining a healthy lifestyle    You may be retaining fluid if you have a history of heart failure or if you experience any of the following symptoms:  Weight gain of 3 pounds or more overnight or 5 pounds in a week, increased swelling in our hands or feet or shortness of breath while lying flat in bed. Please call your doctor as soon as you notice any of these symptoms; do not wait until your next office visit.

## 2021-04-08 NOTE — ANESTHESIA POSTPROCEDURE EVALUATION
Procedure(s):  ENDOSCOPIC RETROGRADE CHOLANGIOPANCREATOGRAPHY (ERCP) BMI 27  ENDOSCOPIC SPHINCTEROTOMY  ENDOSCOPIC STONE EXTRACTION/BALLOON SWEEP  ENDOSCOPIC RETROGRADE CHOLANGIOPANCREATOGRAPHY DIRECT VISUALIZATION. general    Anesthesia Post Evaluation      Multimodal analgesia: multimodal analgesia used between 6 hours prior to anesthesia start to PACU discharge  Patient location during evaluation: PACU  Patient participation: complete - patient participated  Level of consciousness: awake and alert  Pain management: adequate  Airway patency: patent  Anesthetic complications: no  Cardiovascular status: acceptable  Respiratory status: acceptable  Hydration status: acceptable  Post anesthesia nausea and vomiting:  none  Final Post Anesthesia Temperature Assessment:  Normothermia (36.0-37.5 degrees C)      INITIAL Post-op Vital signs:   Vitals Value Taken Time   /86 04/07/21 1513   Temp 36.4 °C (97.6 °F) 04/07/21 1513   Pulse 71 04/07/21 1515   Resp 16 04/07/21 1513   SpO2 97 % 04/07/21 1515   Vitals shown include unvalidated device data.

## 2021-04-09 ENCOUNTER — HOSPITAL ENCOUNTER (OUTPATIENT)
Dept: CT IMAGING | Age: 71
Discharge: HOME | End: 2021-04-09
Attending: INTERNAL MEDICINE
Payer: OTHER GOVERNMENT

## 2021-04-09 ENCOUNTER — HOSPITAL ENCOUNTER (OUTPATIENT)
Dept: LAB | Age: 71
Discharge: HOME | End: 2021-04-09
Payer: OTHER GOVERNMENT

## 2021-04-09 DIAGNOSIS — J67.9 HYPERSENSITIVITY PNEUMONITIS (HCC): ICD-10-CM

## 2021-04-09 PROCEDURE — 36415 COLL VENOUS BLD VENIPUNCTURE: CPT

## 2021-04-09 PROCEDURE — 86606 ASPERGILLUS ANTIBODY: CPT

## 2021-04-09 PROCEDURE — 71250 CT THORAX DX C-: CPT

## 2021-04-09 PROCEDURE — 86331 IMMUNODIFFUSION OUCHTERLONY: CPT

## 2021-04-17 LAB
Lab: NORMAL
REFERENCE LAB,REFLB: NORMAL
TEST DESCRIPTION:,ATST: NORMAL

## 2021-06-01 ENCOUNTER — HOME HEALTH ADMISSION (OUTPATIENT)
Dept: HOME HEALTH SERVICES | Facility: HOME HEALTH | Age: 71
End: 2021-06-01
Payer: OTHER GOVERNMENT

## 2021-06-01 PROBLEM — R68.89 FORGETFULNESS: Status: ACTIVE | Noted: 2021-06-01

## 2021-06-01 PROBLEM — R26.81 UNSTEADY GAIT: Status: ACTIVE | Noted: 2021-06-01

## 2021-06-01 PROBLEM — R29.6 FREQUENT FALLS: Status: ACTIVE | Noted: 2021-06-01

## 2021-06-03 ENCOUNTER — HOME CARE VISIT (OUTPATIENT)
Dept: SCHEDULING | Facility: HOME HEALTH | Age: 71
End: 2021-06-03
Payer: OTHER GOVERNMENT

## 2021-06-03 VITALS
RESPIRATION RATE: 16 BRPM | SYSTOLIC BLOOD PRESSURE: 138 MMHG | BODY MASS INDEX: 27.38 KG/M2 | WEIGHT: 145 LBS | TEMPERATURE: 98.1 F | DIASTOLIC BLOOD PRESSURE: 80 MMHG | HEART RATE: 68 BPM | HEIGHT: 61 IN | OXYGEN SATURATION: 95 %

## 2021-06-03 PROCEDURE — 3331090002 HH PPS REVENUE DEBIT

## 2021-06-03 PROCEDURE — 3331090001 HH PPS REVENUE CREDIT

## 2021-06-03 PROCEDURE — 400018 HH-NO PAY CLAIM PROCEDURE

## 2021-06-03 PROCEDURE — G0299 HHS/HOSPICE OF RN EA 15 MIN: HCPCS

## 2021-06-03 PROCEDURE — 400013 HH SOC

## 2021-06-04 ENCOUNTER — HOME CARE VISIT (OUTPATIENT)
Dept: SCHEDULING | Facility: HOME HEALTH | Age: 71
End: 2021-06-04
Payer: OTHER GOVERNMENT

## 2021-06-04 VITALS
HEART RATE: 88 BPM | OXYGEN SATURATION: 84 % | RESPIRATION RATE: 18 BRPM | DIASTOLIC BLOOD PRESSURE: 64 MMHG | TEMPERATURE: 97.8 F | SYSTOLIC BLOOD PRESSURE: 106 MMHG

## 2021-06-04 PROCEDURE — 3331090001 HH PPS REVENUE CREDIT

## 2021-06-04 PROCEDURE — G0151 HHCP-SERV OF PT,EA 15 MIN: HCPCS

## 2021-06-04 PROCEDURE — 3331090002 HH PPS REVENUE DEBIT

## 2021-06-05 PROCEDURE — 3331090001 HH PPS REVENUE CREDIT

## 2021-06-05 PROCEDURE — 3331090002 HH PPS REVENUE DEBIT

## 2021-06-06 PROCEDURE — 3331090002 HH PPS REVENUE DEBIT

## 2021-06-06 PROCEDURE — 3331090001 HH PPS REVENUE CREDIT

## 2021-06-07 PROCEDURE — 3331090002 HH PPS REVENUE DEBIT

## 2021-06-07 PROCEDURE — 3331090001 HH PPS REVENUE CREDIT

## 2021-06-08 ENCOUNTER — HOME CARE VISIT (OUTPATIENT)
Dept: SCHEDULING | Facility: HOME HEALTH | Age: 71
End: 2021-06-08
Payer: OTHER GOVERNMENT

## 2021-06-08 ENCOUNTER — HOME CARE VISIT (OUTPATIENT)
Dept: HOME HEALTH SERVICES | Facility: HOME HEALTH | Age: 71
End: 2021-06-08
Payer: OTHER GOVERNMENT

## 2021-06-08 VITALS
DIASTOLIC BLOOD PRESSURE: 70 MMHG | HEART RATE: 80 BPM | SYSTOLIC BLOOD PRESSURE: 122 MMHG | RESPIRATION RATE: 18 BRPM | TEMPERATURE: 97.8 F

## 2021-06-08 PROCEDURE — G0157 HHC PT ASSISTANT EA 15: HCPCS

## 2021-06-08 PROCEDURE — 3331090001 HH PPS REVENUE CREDIT

## 2021-06-08 PROCEDURE — 3331090002 HH PPS REVENUE DEBIT

## 2021-06-09 PROCEDURE — 3331090002 HH PPS REVENUE DEBIT

## 2021-06-09 PROCEDURE — 3331090001 HH PPS REVENUE CREDIT

## 2021-06-10 ENCOUNTER — HOME CARE VISIT (OUTPATIENT)
Dept: SCHEDULING | Facility: HOME HEALTH | Age: 71
End: 2021-06-10
Payer: OTHER GOVERNMENT

## 2021-06-10 VITALS
SYSTOLIC BLOOD PRESSURE: 128 MMHG | TEMPERATURE: 97.4 F | HEART RATE: 78 BPM | DIASTOLIC BLOOD PRESSURE: 78 MMHG | OXYGEN SATURATION: 92 % | RESPIRATION RATE: 18 BRPM

## 2021-06-10 PROCEDURE — 3331090001 HH PPS REVENUE CREDIT

## 2021-06-10 PROCEDURE — 3331090002 HH PPS REVENUE DEBIT

## 2021-06-10 PROCEDURE — G0299 HHS/HOSPICE OF RN EA 15 MIN: HCPCS

## 2021-06-11 ENCOUNTER — HOME CARE VISIT (OUTPATIENT)
Dept: SCHEDULING | Facility: HOME HEALTH | Age: 71
End: 2021-06-11
Payer: OTHER GOVERNMENT

## 2021-06-11 VITALS
RESPIRATION RATE: 19 BRPM | HEART RATE: 76 BPM | OXYGEN SATURATION: 96 % | TEMPERATURE: 97.2 F | SYSTOLIC BLOOD PRESSURE: 132 MMHG | DIASTOLIC BLOOD PRESSURE: 78 MMHG

## 2021-06-11 PROCEDURE — 3331090001 HH PPS REVENUE CREDIT

## 2021-06-11 PROCEDURE — G0157 HHC PT ASSISTANT EA 15: HCPCS

## 2021-06-11 PROCEDURE — 3331090002 HH PPS REVENUE DEBIT

## 2021-06-12 PROCEDURE — 3331090001 HH PPS REVENUE CREDIT

## 2021-06-12 PROCEDURE — 3331090002 HH PPS REVENUE DEBIT

## 2021-06-13 PROCEDURE — 3331090001 HH PPS REVENUE CREDIT

## 2021-06-13 PROCEDURE — 3331090002 HH PPS REVENUE DEBIT

## 2021-06-14 ENCOUNTER — HOME CARE VISIT (OUTPATIENT)
Dept: SCHEDULING | Facility: HOME HEALTH | Age: 71
End: 2021-06-14
Payer: OTHER GOVERNMENT

## 2021-06-14 VITALS
SYSTOLIC BLOOD PRESSURE: 118 MMHG | OXYGEN SATURATION: 93 % | HEART RATE: 78 BPM | RESPIRATION RATE: 18 BRPM | TEMPERATURE: 98.8 F | DIASTOLIC BLOOD PRESSURE: 72 MMHG

## 2021-06-14 PROCEDURE — 3331090001 HH PPS REVENUE CREDIT

## 2021-06-14 PROCEDURE — G0157 HHC PT ASSISTANT EA 15: HCPCS

## 2021-06-14 PROCEDURE — 3331090002 HH PPS REVENUE DEBIT

## 2021-06-15 ENCOUNTER — HOME CARE VISIT (OUTPATIENT)
Dept: SCHEDULING | Facility: HOME HEALTH | Age: 71
End: 2021-06-15
Payer: OTHER GOVERNMENT

## 2021-06-15 VITALS
OXYGEN SATURATION: 95 % | SYSTOLIC BLOOD PRESSURE: 142 MMHG | RESPIRATION RATE: 17 BRPM | DIASTOLIC BLOOD PRESSURE: 82 MMHG | TEMPERATURE: 97.4 F | HEART RATE: 58 BPM

## 2021-06-15 PROCEDURE — G0299 HHS/HOSPICE OF RN EA 15 MIN: HCPCS

## 2021-06-15 PROCEDURE — 3331090002 HH PPS REVENUE DEBIT

## 2021-06-15 PROCEDURE — 3331090001 HH PPS REVENUE CREDIT

## 2021-06-16 ENCOUNTER — HOME CARE VISIT (OUTPATIENT)
Dept: HOME HEALTH SERVICES | Facility: HOME HEALTH | Age: 71
End: 2021-06-16
Payer: OTHER GOVERNMENT

## 2021-06-16 ENCOUNTER — HOME CARE VISIT (OUTPATIENT)
Dept: SCHEDULING | Facility: HOME HEALTH | Age: 71
End: 2021-06-16
Payer: OTHER GOVERNMENT

## 2021-06-16 PROCEDURE — 3331090002 HH PPS REVENUE DEBIT

## 2021-06-16 PROCEDURE — G0157 HHC PT ASSISTANT EA 15: HCPCS

## 2021-06-16 PROCEDURE — 3331090001 HH PPS REVENUE CREDIT

## 2021-06-17 PROCEDURE — 3331090001 HH PPS REVENUE CREDIT

## 2021-06-17 PROCEDURE — 3331090002 HH PPS REVENUE DEBIT

## 2021-06-18 PROCEDURE — 3331090001 HH PPS REVENUE CREDIT

## 2021-06-18 PROCEDURE — 3331090002 HH PPS REVENUE DEBIT

## 2021-06-19 PROCEDURE — 3331090002 HH PPS REVENUE DEBIT

## 2021-06-19 PROCEDURE — 3331090001 HH PPS REVENUE CREDIT

## 2021-06-20 PROCEDURE — 3331090001 HH PPS REVENUE CREDIT

## 2021-06-20 PROCEDURE — 3331090002 HH PPS REVENUE DEBIT

## 2021-06-21 PROCEDURE — 3331090002 HH PPS REVENUE DEBIT

## 2021-06-21 PROCEDURE — 3331090001 HH PPS REVENUE CREDIT

## 2021-06-22 ENCOUNTER — HOSPITAL ENCOUNTER (OUTPATIENT)
Dept: MRI IMAGING | Age: 71
Discharge: HOME | End: 2021-06-22
Attending: FAMILY MEDICINE
Payer: OTHER GOVERNMENT

## 2021-06-22 ENCOUNTER — HOME CARE VISIT (OUTPATIENT)
Dept: SCHEDULING | Facility: HOME HEALTH | Age: 71
End: 2021-06-22
Payer: OTHER GOVERNMENT

## 2021-06-22 VITALS
SYSTOLIC BLOOD PRESSURE: 94 MMHG | DIASTOLIC BLOOD PRESSURE: 62 MMHG | HEART RATE: 72 BPM | OXYGEN SATURATION: 76 % | TEMPERATURE: 97.3 F | RESPIRATION RATE: 18 BRPM

## 2021-06-22 DIAGNOSIS — R29.6 FREQUENT FALLS: ICD-10-CM

## 2021-06-22 DIAGNOSIS — R26.81 UNSTEADY GAIT: ICD-10-CM

## 2021-06-22 DIAGNOSIS — R68.89 FORGETFULNESS: ICD-10-CM

## 2021-06-22 PROCEDURE — 3331090001 HH PPS REVENUE CREDIT

## 2021-06-22 PROCEDURE — 70551 MRI BRAIN STEM W/O DYE: CPT

## 2021-06-22 PROCEDURE — G0151 HHCP-SERV OF PT,EA 15 MIN: HCPCS

## 2021-06-22 PROCEDURE — 3331090002 HH PPS REVENUE DEBIT

## 2021-06-23 PROCEDURE — 3331090001 HH PPS REVENUE CREDIT

## 2021-06-23 PROCEDURE — 3331090002 HH PPS REVENUE DEBIT

## 2021-08-31 PROBLEM — Z12.31 SCREENING MAMMOGRAM, ENCOUNTER FOR: Status: ACTIVE | Noted: 2021-08-31

## 2021-09-08 ENCOUNTER — HOSPITAL ENCOUNTER (OUTPATIENT)
Dept: MAMMOGRAPHY | Age: 71
Discharge: HOME | End: 2021-09-08
Attending: FAMILY MEDICINE

## 2021-09-08 DIAGNOSIS — Z12.31 SCREENING MAMMOGRAM, ENCOUNTER FOR: ICD-10-CM

## 2021-09-30 PROBLEM — Z12.31 SCREENING MAMMOGRAM, ENCOUNTER FOR: Status: RESOLVED | Noted: 2021-08-31 | Resolved: 2021-09-30

## 2022-01-05 PROBLEM — R06.02 SOBOE (SHORTNESS OF BREATH ON EXERTION): Status: ACTIVE | Noted: 2022-01-05

## 2022-01-14 ENCOUNTER — HOSPITAL ENCOUNTER (OUTPATIENT)
Dept: GENERAL RADIOLOGY | Age: 72
Discharge: HOME | End: 2022-01-14
Payer: OTHER GOVERNMENT

## 2022-01-14 DIAGNOSIS — Z09 HOSPITAL DISCHARGE FOLLOW-UP: ICD-10-CM

## 2022-01-14 PROCEDURE — 71046 X-RAY EXAM CHEST 2 VIEWS: CPT

## 2022-02-02 PROBLEM — J18.9 PNEUMONITIS: Status: ACTIVE | Noted: 2021-03-04

## 2022-02-02 PROBLEM — R05.3 CHRONIC COUGH: Status: ACTIVE | Noted: 2022-02-02

## 2022-03-18 PROBLEM — K52.9 GASTROENTERITIS: Status: ACTIVE | Noted: 2020-08-15

## 2022-03-18 PROBLEM — H90.3 SENSORINEURAL HEARING LOSS (SNHL) OF BOTH EARS: Status: ACTIVE | Noted: 2020-03-27

## 2022-03-18 PROBLEM — Z99.81 O2 DEPENDENT: Status: ACTIVE | Noted: 2020-10-07

## 2022-03-18 PROBLEM — R79.0 LOW MAGNESIUM LEVEL: Status: ACTIVE | Noted: 2020-09-29

## 2022-03-18 PROBLEM — H91.11 PRESBYCUSIS OF RIGHT EAR WITH UNRESTRICTED HEARING OF LEFT EAR: Status: ACTIVE | Noted: 2018-01-23

## 2022-03-18 PROBLEM — J30.9 ALLERGIC RHINITIS: Status: ACTIVE | Noted: 2019-01-10

## 2022-03-18 PROBLEM — R29.6 FREQUENT FALLS: Status: ACTIVE | Noted: 2021-06-01

## 2022-03-18 PROBLEM — R68.89 FORGETFULNESS: Status: ACTIVE | Noted: 2021-06-01

## 2022-03-18 PROBLEM — E83.39 HYPOPHOSPHATEMIA: Status: ACTIVE | Noted: 2020-08-22

## 2022-03-18 PROBLEM — R91.8 PULMONARY INFILTRATE ON CHEST X-RAY: Status: ACTIVE | Noted: 2020-08-17

## 2022-03-18 PROBLEM — J18.9 PNEUMONIA OF BOTH LUNGS DUE TO INFECTIOUS ORGANISM: Status: ACTIVE | Noted: 2020-09-29

## 2022-03-18 PROBLEM — Z86.39 H/O HASHIMOTO THYROIDITIS: Status: ACTIVE | Noted: 2020-08-15

## 2022-03-18 PROBLEM — F41.9 ANXIETY: Status: ACTIVE | Noted: 2017-08-07

## 2022-03-18 PROBLEM — D64.9 ABSOLUTE ANEMIA: Status: ACTIVE | Noted: 2020-09-29

## 2022-03-18 PROBLEM — R53.81 DEBILITY: Status: ACTIVE | Noted: 2020-09-29

## 2022-03-19 PROBLEM — R26.81 UNSTEADY GAIT: Status: ACTIVE | Noted: 2021-06-01

## 2022-03-19 PROBLEM — N17.9 AKI (ACUTE KIDNEY INJURY) (HCC): Status: ACTIVE | Noted: 2020-08-15

## 2022-03-19 PROBLEM — F32.0 CURRENT MILD EPISODE OF MAJOR DEPRESSIVE DISORDER WITHOUT PRIOR EPISODE (HCC): Status: ACTIVE | Noted: 2020-09-29

## 2022-03-19 PROBLEM — E87.20 METABOLIC ACIDOSIS: Status: ACTIVE | Noted: 2020-08-17

## 2022-03-19 PROBLEM — Z00.00 PHYSICAL EXAM: Status: ACTIVE | Noted: 2019-04-10

## 2022-03-19 PROBLEM — Z12.11 ENCOUNTER FOR SCREENING COLONOSCOPY: Status: ACTIVE | Noted: 2020-10-07

## 2022-03-19 PROBLEM — D69.6 THROMBOCYTOPENIA (HCC): Status: ACTIVE | Noted: 2020-08-17

## 2022-03-19 PROBLEM — R06.02 SOBOE (SHORTNESS OF BREATH ON EXERTION): Status: ACTIVE | Noted: 2022-01-05

## 2022-03-19 PROBLEM — J98.4 PNEUMONITIS: Status: ACTIVE | Noted: 2021-03-04

## 2022-03-19 PROBLEM — R65.21 SEPTIC SHOCK (HCC): Status: ACTIVE | Noted: 2020-08-15

## 2022-03-19 PROBLEM — N18.9 CHRONIC KIDNEY DISEASE: Status: ACTIVE | Noted: 2019-09-23

## 2022-03-19 PROBLEM — I51.89 DIASTOLIC DYSFUNCTION: Status: ACTIVE | Noted: 2020-09-29

## 2022-03-19 PROBLEM — Z91.09 ENVIRONMENTAL ALLERGIES: Status: ACTIVE | Noted: 2019-09-23

## 2022-03-19 PROBLEM — R05.3 CHRONIC COUGH: Status: ACTIVE | Noted: 2022-02-02

## 2022-03-19 PROBLEM — J96.00 ACUTE RESPIRATORY FAILURE (HCC): Status: ACTIVE | Noted: 2020-10-10

## 2022-03-19 PROBLEM — J18.9 PNEUMONITIS: Status: ACTIVE | Noted: 2021-03-04

## 2022-03-19 PROBLEM — Z86.19 H/O SEPTIC SHOCK: Status: ACTIVE | Noted: 2020-09-02

## 2022-03-19 PROBLEM — E87.1 HYPONATREMIA: Status: ACTIVE | Noted: 2020-09-02

## 2022-03-19 PROBLEM — R93.89 ABNORMAL CXR (CHEST X-RAY): Status: ACTIVE | Noted: 2020-11-27

## 2022-03-19 PROBLEM — I50.30 HEART FAILURE WITH PRESERVED EJECTION FRACTION (HCC): Status: ACTIVE | Noted: 2020-09-02

## 2022-03-19 PROBLEM — E83.51 LOW CALCIUM LEVELS: Status: ACTIVE | Noted: 2020-09-29

## 2022-03-19 PROBLEM — Z71.89 ACP (ADVANCE CARE PLANNING): Status: ACTIVE | Noted: 2019-04-10

## 2022-03-19 PROBLEM — Z23 ENCOUNTER FOR IMMUNIZATION: Status: ACTIVE | Noted: 2019-04-10

## 2022-03-19 PROBLEM — Z09 ENCOUNTER FOR EXAMINATION FOLLOWING TREATMENT AT HOSPITAL: Status: ACTIVE | Noted: 2020-09-29

## 2022-03-19 PROBLEM — A41.9 SEPTIC SHOCK (HCC): Status: ACTIVE | Noted: 2020-08-15

## 2022-03-19 PROBLEM — J96.11 CHRONIC RESPIRATORY FAILURE WITH HYPOXIA (HCC): Status: ACTIVE | Noted: 2021-03-04

## 2022-03-19 PROBLEM — J47.9 BRONCHIECTASIS WITHOUT COMPLICATION (HCC): Status: ACTIVE | Noted: 2021-03-04

## 2022-03-20 PROBLEM — R78.81 BACTEREMIA DUE TO STREPTOCOCCUS: Status: ACTIVE | Noted: 2020-09-02

## 2022-03-20 PROBLEM — R74.01 ELEVATED AST (SGOT): Status: ACTIVE | Noted: 2020-10-07

## 2022-03-20 PROBLEM — E11.9 TYPE 2 DIABETES MELLITUS WITHOUT COMPLICATION, WITHOUT LONG-TERM CURRENT USE OF INSULIN (HCC): Status: ACTIVE | Noted: 2020-08-15

## 2022-03-20 PROBLEM — Z12.39 SCREENING FOR BREAST CANCER: Status: ACTIVE | Noted: 2019-04-10

## 2022-03-20 PROBLEM — B95.5 BACTEREMIA DUE TO STREPTOCOCCUS: Status: ACTIVE | Noted: 2020-09-02

## 2022-05-04 ENCOUNTER — ANESTHESIA (OUTPATIENT)
Dept: ENDOSCOPY | Age: 72
End: 2022-05-04
Payer: OTHER GOVERNMENT

## 2022-05-04 ENCOUNTER — HOSPITAL ENCOUNTER (OUTPATIENT)
Age: 72
Setting detail: OUTPATIENT SURGERY
Discharge: HOME | End: 2022-05-04
Attending: INTERNAL MEDICINE | Admitting: INTERNAL MEDICINE
Payer: OTHER GOVERNMENT

## 2022-05-04 ENCOUNTER — ANESTHESIA EVENT (OUTPATIENT)
Dept: ENDOSCOPY | Age: 72
End: 2022-05-04
Payer: OTHER GOVERNMENT

## 2022-05-04 VITALS
SYSTOLIC BLOOD PRESSURE: 142 MMHG | RESPIRATION RATE: 14 BRPM | WEIGHT: 143 LBS | HEIGHT: 61 IN | OXYGEN SATURATION: 94 % | HEART RATE: 69 BPM | DIASTOLIC BLOOD PRESSURE: 77 MMHG | TEMPERATURE: 97 F | BODY MASS INDEX: 27 KG/M2

## 2022-05-04 PROCEDURE — 74011250636 HC RX REV CODE- 250/636: Performed by: ANESTHESIOLOGY

## 2022-05-04 PROCEDURE — 2709999900 HC NON-CHARGEABLE SUPPLY: Performed by: INTERNAL MEDICINE

## 2022-05-04 PROCEDURE — 74011250636 HC RX REV CODE- 250/636: Performed by: NURSE ANESTHETIST, CERTIFIED REGISTERED

## 2022-05-04 PROCEDURE — 76060000031 HC ANESTHESIA FIRST 0.5 HR: Performed by: INTERNAL MEDICINE

## 2022-05-04 PROCEDURE — 74011000250 HC RX REV CODE- 250: Performed by: NURSE ANESTHETIST, CERTIFIED REGISTERED

## 2022-05-04 PROCEDURE — 76040000025: Performed by: INTERNAL MEDICINE

## 2022-05-04 RX ORDER — LIDOCAINE HYDROCHLORIDE 20 MG/ML
INJECTION, SOLUTION EPIDURAL; INFILTRATION; INTRACAUDAL; PERINEURAL AS NEEDED
Status: DISCONTINUED | OUTPATIENT
Start: 2022-05-04 | End: 2022-05-04 | Stop reason: HOSPADM

## 2022-05-04 RX ORDER — SODIUM CHLORIDE, SODIUM LACTATE, POTASSIUM CHLORIDE, CALCIUM CHLORIDE 600; 310; 30; 20 MG/100ML; MG/100ML; MG/100ML; MG/100ML
1000 INJECTION, SOLUTION INTRAVENOUS CONTINUOUS
Status: DISCONTINUED | OUTPATIENT
Start: 2022-05-04 | End: 2022-05-04 | Stop reason: HOSPADM

## 2022-05-04 RX ORDER — PROPOFOL 10 MG/ML
INJECTION, EMULSION INTRAVENOUS
Status: DISCONTINUED | OUTPATIENT
Start: 2022-05-04 | End: 2022-05-04 | Stop reason: HOSPADM

## 2022-05-04 RX ADMIN — SODIUM CHLORIDE, SODIUM LACTATE, POTASSIUM CHLORIDE, AND CALCIUM CHLORIDE 1000 ML: 600; 310; 30; 20 INJECTION, SOLUTION INTRAVENOUS at 07:59

## 2022-05-04 RX ADMIN — PROPOFOL 50 MG: 10 INJECTION, EMULSION INTRAVENOUS at 08:28

## 2022-05-04 RX ADMIN — LIDOCAINE HYDROCHLORIDE 100 MG: 20 INJECTION, SOLUTION EPIDURAL; INFILTRATION; INTRACAUDAL; PERINEURAL at 08:25

## 2022-05-04 RX ADMIN — PROPOFOL 50 MG: 10 INJECTION, EMULSION INTRAVENOUS at 08:25

## 2022-05-04 RX ADMIN — SODIUM CHLORIDE, SODIUM LACTATE, POTASSIUM CHLORIDE, AND CALCIUM CHLORIDE: 600; 310; 30; 20 INJECTION, SOLUTION INTRAVENOUS at 08:35

## 2022-05-04 RX ADMIN — PROPOFOL 50 MG: 10 INJECTION, EMULSION INTRAVENOUS at 08:32

## 2022-05-04 NOTE — ANESTHESIA POSTPROCEDURE EVALUATION
Procedure(s):  ESOPHAGOGASTRODUODENOSCOPY (EGD)/ 26  ESOPHAGEAL DILATION. total IV anesthesia    Anesthesia Post Evaluation        Patient location during evaluation: PACU  Patient participation: complete - patient participated  Level of consciousness: awake and alert  Pain management: adequate  Airway patency: patent  Anesthetic complications: no  Cardiovascular status: acceptable  Respiratory status: acceptable  Hydration status: acceptable  Post anesthesia nausea and vomiting:  none  Final Post Anesthesia Temperature Assessment:  Normothermia (36.0-37.5 degrees C)      INITIAL Post-op Vital signs:   Vitals Value Taken Time   /77 05/04/22 0910   Temp 36.1 °C (97 °F) 05/04/22 0841   Pulse 73 05/04/22 0912   Resp 14 05/04/22 0910   SpO2 96 % 05/04/22 0912   Vitals shown include unvalidated device data.

## 2022-05-04 NOTE — ANESTHESIA PREPROCEDURE EVALUATION
Relevant Problems   RESPIRATORY SYSTEM   (+) Asthma   (+) H/O septic shock   (+) Pneumonia of both lungs due to infectious organism   (+) SOBOE (shortness of breath on exertion)      NEUROLOGY   (+) Current mild episode of major depressive disorder without prior episode (HCC)      CARDIOVASCULAR   (+) Essential hypertension      RENAL FAILURE   (+) SAGAR (acute kidney injury) ([facility] [address]   (+) Chronic kidney disease      ENDOCRINE   (+) Arthritis   (+) Hypothyroidism   (+) Type 2 diabetes mellitus without complication, without long-term current use of insulin (HCC)      HEMATOLOGY   (+) Absolute anemia       Anesthetic History   No history of anesthetic complications            Review of Systems / Medical History  Patient summary reviewed and pertinent labs reviewed    Pulmonary            Asthma     Comments: Note from pulmonology detailing what may be hypersensitivity pneumonitis    Neuro/Psych         Psychiatric history     Cardiovascular    Hypertension: well controlled          Hyperlipidemia    Exercise tolerance: <4 METS  Comments: ECHO '20 - preserved EF with grade 2 diastolic dysfunction   GI/Hepatic/Renal         Renal disease: CRI       Endo/Other    Diabetes: type 2  Hypothyroidism: well controlled  Arthritis     Other Findings   Comments: /Bronchiectasis in the past  Pneumonia with respiratory failure 9/20  Oxygen at home as needed          Physical Exam    Airway  Mallampati: II  TM Distance: > 6 cm  Neck ROM: normal range of motion   Mouth opening: Normal     Cardiovascular    Rhythm: regular  Rate: normal         Dental  No notable dental hx       Pulmonary    :bibasilar      Rales:bilateral  Prolonged expiration     Abdominal         Other Findings   Comments: Sat 92% on room air         Anesthetic Plan    ASA: 4  Anesthesia type: total IV anesthesia          Induction: Intravenous  Anesthetic plan and risks discussed with: Patient

## 2022-05-04 NOTE — PROCEDURES
ESOPHAGOGASTRODUODENOSCOPY    DATE of PROCEDURE: 5/4/2022    PT NAME: Trixie Perez     [id number]    MEDICATION:   MAC    INSTRUMENT: GIFH 190    SPECIAL PROCEDURE: failed 56 fr mckeon; successful 51 fr savary  BLOOD LOSS- 0 or min. SPEC- no  IMPLANT- none    PROCEDURE:  After informed consent, the patient was placed Under anesthesia in the left lateral decubitus position. The endoscope was passed visually without difficulty. The examination was performed to the duodenum with the findings and treatments as outlined below. Patient's tolerated the procedure well. No apparent complications. ASSESSMENT:  1. Empiric dilation- could not pass the mckeon thru the UES safely but success with the savary- no trauma  2. Small gastric varix in the cardia  3. Mild antral gastritis  4.  Mild duodenitis    PLAN:   1. F/U Dr. Alysha Falcon- 3 months    Kole Terrell MD

## 2022-05-04 NOTE — H&P
Patient: Penny Casillas  YOB: 1950   Date:                        22      This 66year old  patient was referred by Carlton Chowdhury MD.  This 66year old female presents for elevated LFTs. History of Present Illness:  1.  elevated LFTs   Ms. Barbara Paige is a 66year old female who presents for a follow up of elevated LFTs. She was last seen in the office by me 21 for above. US ABD Complete 10/5/21: 1.8 and 1.4 cm mobile moderate caliber gallstones. Portion of pancreas, lower pole, right kidney, and liver not seen well due to gas. Mild aortic atherosclerosis and plaque. Proximal main portal vein patent and normal caliber. Narrowed smaller caliber in zaki hepatis may relate to cavernous transformation. Patient is accompanied by her son who helps provide the history. He reports her main complaint is a persistent cough, and a loss of taste for several foods. She denies having a history of COVID-19. She is followed by Dr. Pascual Villavicencio for her pulmonary needs. Weight is down 12lbs in 6 months. No fever, chills, hematochezia, melena, or other alarm symptoms noted.     Past Medical/Surgical History:   (Detailed)  Disease/disorder Onset Date Management Date Comments   Spyglass for abn MRCP- no stricture  ERCP 2021    Dyslipidemia       Hypothyroidism       Psoriasis       Osteopenia       Vertigo       Allergic rhinitis       Chronic kidney disease       Environmental allergies       Sensorineural hearing loss of both ears       Hx of pneumonia of both lower lobes d/t infectious organism       Hypokalemia       Debility       Diastolic dysfunction       Anxiety       Asthma       Cholelithiasis/Gallstones       Depression       Diabetes       Hypertension         Abdominoplasty       ARACELI       Lung biopsy       Salpingo-oophorectomy       Thyroidectomy     Varices, gastric         Family History:  (Detailed)  Relationship Family Member Name  Age at Death Condition Onset Age Cause of Death       No family history of Liver disease  N       Social History:  (Detailed)  Tobacco use reviewed. Preferred language is [place]. Tobacco use status: Current non-smoker. Smoking status: Never smoker. SMOKING STATUS  Type Smoking Status Usage Per Day Years Used Total Pack Years    Never smoker        ALCOHOL  There is no history of alcohol use. CAFFEINE  The patient uses caffeine: energy drinks and coffee. [facility] HOME ENVIRONMENT/SAFETY  The patient has not fallen in the last year. COMMENTS  +ear piercings. No history of blood transfusions, tattoos, IVDA, or Hep A/B vaccinations. Current Medications:  Medication Directions   amlodipine 5 mg tablet take 1 tablet by oral route  every day   atorvastatin 20 mg tablet take 1 tablet by oral route  every day   vitamin B complex tablet TAKE 1 TABLET DAILY   Calcium 500 With D 500 mg (1,250 mg)-400 unit tablet take my mouth once daily   famotidine 40 mg tablet take 1 tablet by oral route  every day at bedtime   ferrous sulfate 325 mg (65 mg iron) tablet,delayed release TAKE 1 TABLET DAILY   furosemide take 1 tablet by oral route  TWICE a WEEK   levothyroxine 50 mcg tablet take 1 tablet by oral route  every day   magnesium 400 mg (as magnesium oxide) tablet TAKE 1 TABLET DAILY   metoprolol succinate ER 25 mg tablet,extended release 24 hr take 1.5 tablet daily   potassium gluconate 595 mg (99 mg) tablet take 1 po qd   sertraline 100 mg tablet take 1 tablet by oral route  every day   Ventolin HFA 90 mcg/actuation aerosol inhaler USE AS NEEDED     Allergies:  Ingredient Reaction (Severity) Medication Name Comment   NO KNOWN ALLERGIES      Reviewed, no changes. Review of Systems:  System Neg/Pos Details   Constitutional Negative Fatigue, Fever and Weight loss. ENMT Positive Dyspnea. ENMT Negative Hearing deficit, Hoarseness and Sleep apnea. ENMT Comments Altered sense of taste. Eyes Negative Vision changes.    Respiratory Positive Cough.   Respiratory Negative Dyspnea. Cardio Negative Chest pain and Irregular heartbeat/palpitations. GI Negative Abdominal pain, Constipation, Diarrhea, Dysphagia, Heartburn, Jaundice, Nausea, Rectal bleeding, Reflux and Vomiting.  Negative Dysuria, Urinary difficulty and Urinary frequency. Endocrine Negative Cold intolerance, Heat intolerance and Weight gain. Neuro Negative Confusion/disorientation, Dizziness, Headache and Seizures. Psych Negative Anxiety, Depression and Panic attacks. Integumentary Negative Itching skin and Rash. MS Negative Joint pain and Myalgia. Hema/Lymph Negative Anemia and Easy bleeding. Reproductive Negative Breast lumps. Vital Signs:  BP mm/Hg Pulse Resp Pulse Ox Temp F Ht (Total in.) Weight (lbs.) Weight (oz.) BMI   126/70 88 16 98 97.70 61.00 137.60  26.00     Physical Exam:  Exam Findings Details   Constitutional * Overall appearance - age appropriate, non-toxic, overweight. Eyes Normal Sclera - Right: Normal, Left: Normal.   Nasopharynx Normal Lips/teeth/gums - Normal.   Neck Exam Normal Inspection - Normal.   Respiratory Normal Inspection - Normal.   Cardiovascular Normal Regular rate and rhythm. No murmurs, gallops, or rubs. Abdomen Normal Inspection - Normal. Auscultation - Normal. Percussion - Normal. No abdominal tenderness. No hepatic enlargement. No spleen enlargement. No Ascites. Skin Normal Inspection - Normal.   Musculoskeletal Normal Hands/Wrist - Right: Normal, Left: Normal.   Extremity Normal No edema. Neurological Normal Fine motor skills - Normal.   Psychiatric Normal Orientation - Oriented to time, place, person & situation. Appropriate mood and affect. Assessment/Plan:  # Detail Type Description    1. Assessment Gastric varices (I86.4). Provider Plan surveillance EGD . Clark Galvin  Risks (bleed, perforation, infection, untoward CV or resp. event, mortality, etc.), benefits and alternatives of the procedures were discussed with the patient who agrees to proceed. Plan Orders Further evaluations ordered today include EGD to be performed. 2.                     3.                4.                5. Assessment Diverticulosis (K57.90). 6. Assessment Hx of colonic polyps (Z86.010). 7. Assessment Non-insulin dependent type 2 diabetes mellitus (E11.9). 8. Assessment Hypertension, unspecified type (I10). 9. Assessment Stage 2 chronic kidney disease (N18.2). Assessment Thyroid disease (E07.9). 11. Assessment Bronchiectasis without complication (L31.5). 12. Assessment Chronic respiratory failure, unspecified whether with hypoxia or hypercapnia (J96.10).

## 2022-05-11 PROBLEM — J67.9 HYPERSENSITIVITY PNEUMONITIS (HCC): Status: ACTIVE | Noted: 2021-03-04

## 2022-05-11 PROBLEM — J20.9 ACUTE BRONCHITIS: Status: ACTIVE | Noted: 2022-05-11

## 2022-05-11 PROBLEM — J32.9 SINUSITIS: Status: ACTIVE | Noted: 2022-05-11

## 2022-05-11 PROBLEM — J84.10 PULMONARY FIBROSIS (HCC): Status: ACTIVE | Noted: 2022-05-11

## 2022-05-25 ENCOUNTER — TELEMEDICINE (OUTPATIENT)
Dept: PRIMARY CARE CLINIC | Facility: CLINIC | Age: 72
End: 2022-05-25
Payer: OTHER GOVERNMENT

## 2022-05-25 DIAGNOSIS — J67.9 HYPERSENSITIVITY PNEUMONITIS (HCC): ICD-10-CM

## 2022-05-25 DIAGNOSIS — J96.11 CHRONIC RESPIRATORY FAILURE WITH HYPOXIA (HCC): ICD-10-CM

## 2022-05-25 DIAGNOSIS — J47.9 BRONCHIECTASIS WITHOUT COMPLICATION (HCC): ICD-10-CM

## 2022-05-25 DIAGNOSIS — J40 BRONCHITIS: Primary | ICD-10-CM

## 2022-05-25 DIAGNOSIS — J84.10 PULMONARY FIBROSIS (HCC): ICD-10-CM

## 2022-05-25 DIAGNOSIS — I50.9 CONGESTIVE HEART FAILURE, UNSPECIFIED HF CHRONICITY, UNSPECIFIED HEART FAILURE TYPE (HCC): Chronic | ICD-10-CM

## 2022-05-25 DIAGNOSIS — R05.3 CHRONIC COUGH: ICD-10-CM

## 2022-05-25 PROCEDURE — 99214 OFFICE O/P EST MOD 30 MIN: CPT | Performed by: FAMILY MEDICINE

## 2022-05-25 PROCEDURE — 1123F ACP DISCUSS/DSCN MKR DOCD: CPT | Performed by: FAMILY MEDICINE

## 2022-05-25 RX ORDER — BENZONATATE 100 MG/1
100 CAPSULE ORAL 2 TIMES DAILY PRN
Qty: 60 CAPSULE | Refills: 2 | Status: SHIPPED | OUTPATIENT
Start: 2022-05-25 | End: 2022-09-22 | Stop reason: SDUPTHER

## 2022-05-25 RX ORDER — PREDNISONE 20 MG/1
20 TABLET ORAL DAILY
Qty: 5 TABLET | Refills: 0 | Status: SHIPPED | OUTPATIENT
Start: 2022-05-25 | End: 2022-05-30

## 2022-05-25 ASSESSMENT — PATIENT HEALTH QUESTIONNAIRE - PHQ9
SUM OF ALL RESPONSES TO PHQ QUESTIONS 1-9: 0
SUM OF ALL RESPONSES TO PHQ QUESTIONS 1-9: 0
2. FEELING DOWN, DEPRESSED OR HOPELESS: 0
SUM OF ALL RESPONSES TO PHQ9 QUESTIONS 1 & 2: 0
SUM OF ALL RESPONSES TO PHQ QUESTIONS 1-9: 0
SUM OF ALL RESPONSES TO PHQ QUESTIONS 1-9: 0
1. LITTLE INTEREST OR PLEASURE IN DOING THINGS: 0

## 2022-05-25 NOTE — PROGRESS NOTES
M19.90    Osteopenia M85.80    Hypokalemia E87.6    Vertigo R42    Anxiety F41.9    Presbycusis of right ear with unrestricted hearing of left ear H91.11    Allergic rhinitis J30.9    Physical exam Z00.00    Encounter for immunization Z23    ACP (advance care planning) Z71.89    Screening for breast cancer Z12.39    Chronic kidney disease N18.9    Environmental allergies Z91.09    Sensorineural hearing loss (SNHL) of both ears H90.3    Encounter for examination following treatment at hospital Z09    Pneumonia of both lungs due to infectious organism J18.9    Low magnesium level U96.5    Diastolic dysfunction Y22.05    Current mild episode of major depressive disorder without prior episode ([facility]) F32.0    Absolute anemia D64.9    Low calcium levels E83.51    Debility R53.81    Elevated AST (SGOT) R74.01    O2 dependent Z99.81    Encounter for screening colonoscopy Z12.11    Acute respiratory failure (HCC) J96.00    Abnormal CXR (chest x-ray) R93.89    Hypersensitivity pneumonitis ([facility]) J67.9    Bronchiectasis without complication ([facility]) I89.7    Chronic respiratory failure with hypoxia ([facility]) J96.11    ERIC (acute kidney injury) ([facility]) N17.9    Bacteremia due to Streptococcus R78.81, B95.5    Gastroenteritis K52.9    H/O Hashimoto thyroiditis Z80.42    H/O septic shock Z86.19    Heart failure with preserved ejection fraction ([facility]) I50.30    Hyponatremia E87.1    Hypophosphatemia N17.68    Metabolic acidosis K01.1    Pulmonary infiltrate on chest x-ray R91.8    Septic shock ([facility]) A41.9, R65.21    Thrombocytopenia ([facility]) D69.6    Type 2 diabetes mellitus without complication, without long-term current use of insulin ([facility]) E11.9    Frequent falls R29.6    Unsteady gait R26.81    Forgetfulness R68.89    SOBOE (shortness of breath on exertion) R06.02    Chronic cough R05.3    Pulmonary fibrosis ([facility]) J84.10    Sinusitis J32.9    Acute bronchitis J20.9       Past Medical History: Diagnosis Date    Abdominal pain onset 6 weeks ago    more with eating    Anemia     Arthritis     osteo    Asthma     prn inhaler    Bronchiectasis without complication (HCC)     oxygen 2 LPM continuous    Chronic kidney disease     stage 2-     Depression     Diastolic dysfunction     Dyslipidemia     Fatigue     Gallstones     being ruled out per son    H/O echocardiogram 12/2021    Raiza- 12/2021 Echo LVEF 65%    Hashimoto's thyroiditis     Hearing reduced     bilat    Heart failure ([address]     Heart failure with preserved ejection fraction-Echo LVEF 65%    Hypertension     controlled with med    Osteopenia     Prediabetes     no meds---A1C 6.5 4/2022 while hospitalized/ recent hx sepsis- previous A1C    Psoriasis     Pulmonary fibrosis (HCC)     PER NOTE IN CC FROM DR VIRGEN-- PT WAS TOLD THIS IN HER PAST-- HOME 2 AND PRN INHALER    Vertigo     hx-- no t a recent problem       Past Surgical History:   Procedure Laterality Date    COLONOSCOPY N/A 11/19/2020    COLONOSCOPY/ BMI 22 performed by Arin Nguyen [facility] at [facility] ENDOSCOPY    EGD  5/4/2022         ERCP  4/7/2021         HX ABDOMINOPLASTY      HX HYSTERECTOMY      Total    HX OTHER SURGICAL      lung biopsy    HX SALPINGO-OOPHORECTOMY Bilateral     HX THYROIDECTOMY      total       Social History     Socioeconomic History    Marital status:      Spouse name: Not on file    Number of children: Not on file    Years of education: Not on file    Highest education level: Not on file   Occupational History    Not on file   Tobacco Use    Smoking status: Never Smoker    Smokeless tobacco: Never Used   Vaping Use    Vaping Use: Never used   Substance and Sexual Activity    Alcohol use: No    Drug use: No    Sexual activity: Not on file   Other Topics Concern    Not on file   Social History Narrative    Not on file     Social Determinants of Health     Financial Resource Strain: Low Risk     Difficulty of Paying Living Expenses: Not hard at all   Food Insecurity: No Food Insecurity    Worried About [facility] in the Last Year: Never true    Ran Out of Food in the Last Year: Never true   Transportation Needs: No Transportation Needs    Lack of Transportation (Medical): No    Lack of Transportation (Non-Medical): No   Physical Activity:     Days of Exercise per Week: Not on file    Minutes of Exercise per Session: Not on file   Stress:     Feeling of Stress : Not on file   Social Connections:     Frequency of Communication with Friends and Family: Not on file    Frequency of Social Gatherings with Friends and Family: Not on file    Attends Shinto Services: Not on file    Active Member of [employer] Group or Organizations: Not on file    Attends Club or Organization Meetings: Not on file    Marital Status: Not on file   Intimate Partner Violence:     Fear of Current or Ex-Partner: Not on file    Emotionally Abused: Not on file    Physically Abused: Not on file    Sexually Abused: Not on file   Housing Stability:     Unable to Pay for Housing in the Last Year: Not on file    Number of [place] in the Last Year: Not on file    Unstable Housing in the Last Year: Not on file       Allergies   Allergen Reactions    Hydrochlorothiazide Other (comments)     hypokalemia    Lisinopril Cough       Current Outpatient Medications   Medication Sig Dispense Refill    OXYGEN-AIR DELIVERY SYSTEMS 2 Each by Nasal route. 2 LITERS      azithromycin (ZITHROMAX) 500 mg tab Take 1 Tablet by mouth daily. 5 Tablet 0    predniSONE (DELTASONE) 20 mg tablet Take 20 mg by mouth daily (with breakfast). 5 Tablet 0    fluticasone furoate-vilanteroL (BREO ELLIPTA) 200-25 mcg/dose inhaler Take 1 Puff by inhalation daily. 1 Each 5    benzonatate (Tessalon Perles) 100 mg capsule Take 1 Capsule by mouth two (2) times daily as needed for Cough.  30 Capsule 2    atorvastatin (LIPITOR) 20 mg tablet Take 1 Tablet by mouth daily for 30 days. Indications: excessive fat in the blood 90 Tablet 0    sertraline (ZOLOFT) 100 mg tablet Take 1 Tablet by mouth daily. 90 Tablet 0    fluticasone propion-salmeteroL (Wixela Inhub) 250-50 mcg/dose diskus inhaler Take 1 Puff by inhalation two (2) times a day.  levothyroxine (SYNTHROID) 50 mcg tablet Take 1 Tablet by mouth Daily (before breakfast). Dose decreased to 50 mcg on 1/5/22 based on TSH 90 Tablet 0    magnesium oxide (MAG-OX) 400 mg tablet Take 1 Tablet by mouth daily. 90 Tablet 0    metoprolol succinate (TOPROL-XL) 50 mg XL tablet Take 1 Tablet by mouth daily. Dose increased to 50 mg on 6/1/21 (Patient taking differently: Take 25 mg by mouth daily. 50 mg tablet- takes one-half tablet daily) 90 Tablet 0    potassium chloride SR (K-TAB) 20 mEq tablet Take 1 Tablet by mouth daily. 90 Tablet 0    furosemide (LASIX) 40 mg tablet Take 0.5 Tablets by mouth daily. 90 Tablet 0    VITAMIN B COMPLEX PO Take  by mouth. With Vitamin C      guaifenesin (MUCINEX PO) Take 600 mg by mouth two (2) times a day.  albuterol (PROVENTIL VENTOLIN) 2.5 mg /3 mL (0.083 %) nebu 3 mL by Nebulization route every four (4) hours as needed for Wheezing. 150 Nebule 2    albuterol (Ventolin HFA) 90 mcg/actuation inhaler Take 2 Puffs by inhalation every four (4) hours as needed for Wheezing. 1 Inhaler 2    Oxygen 2 Each by Nasal route continuous. 2 lpm cont            Review of Systems   Constitutional: Positive for malaise/fatigue. HENT: Negative. Eyes: Negative. Respiratory: Positive for cough and sputum production. Cardiovascular: Negative. Gastrointestinal: Negative. Genitourinary: Negative. Musculoskeletal: Negative. Skin: Negative. Neurological: Negative. Endo/Heme/Allergies: Negative. Psychiatric/Behavioral: Positive for depression. Negative for hallucinations, substance abuse and suicidal ideas. The patient is nervous/anxious. Objective:     There were no vitals taken for this visit. Physical Exam   Constitutional: She is oriented to person, place, and time. She appears well-developed. HENT:   Head: Normocephalic and atraumatic. Right Ear: External ear normal.   Left Ear: External ear normal.   Nose: Nose inflammedl. Mouth/Throat: Oropharynx is clear and moist.   Eyes Conjunctivae and EOM are normal.   Pulmonary/Chest: Effort normal     Neurological: She is alert and oriented to person, place, and time. Skin: Skin is warm and dry. Psychiatric: Her behavior is normal. Judgment and thought content normal.           . RESULTRCNTNC(15W    ASSESSMENT/PLAN:    )   Diagnosis Orders   1. Bronchitis  predniSONE (DELTASONE) 20 MG tablet    XR CHEST (2 VW)    DYAN Hardy MD, Allergy & Immunology, [place]    benzonatate (TESSALON) 100 MG capsule   2. Hypersensitivity pneumonitis (HCC)  predniSONE (DELTASONE) 20 MG tablet    XR CHEST (2 VW)    DYAN Hardy MD, Allergy & Immunology, [place]    benzonatate (TESSALON) 100 MG capsule    Ambulatory referral to Cardiology   3. Pulmonary fibrosis ([address]  predniSONE (DELTASONE) 20 MG tablet    XR CHEST (2 VW)    DYAN Hardy MD, Allergy & Immunology, [place]    benzonatate (TESSALON) 100 MG capsule    Ambulatory referral to Cardiology   4. Bronchiectasis without complication (HCC)  predniSONE (DELTASONE) 20 MG tablet    XR CHEST (2 VW)    DYAN Hardy MD, Allergy & Immunology, [place]    benzonatate (TESSALON) 100 MG capsule   5. Chronic cough  predniSONE (DELTASONE) 20 MG tablet    XR CHEST (2 VW)    DYAN Hardy MD, Allergy & Immunology, [place]    benzonatate (TESSALON) 100 MG capsule   6. Chronic respiratory failure with hypoxia (HCC)  predniSONE (DELTASONE) 20 MG tablet    XR CHEST (2 VW)    DYAN Hardy MD, Allergy & Immunology, [place]    benzonatate (TESSALON) 100 MG capsule    Ambulatory referral to Cardiology   7.  Congestive heart failure, unspecified HF capsule     Other Relevant Orders     REFERRAL TO ENT-OTOLARYNGOLOGY    7. Pulmonary fibrosis (HCC)     Relevant Medications     OXYGEN-AIR DELIVERY SYSTEMS     azithromycin (ZITHROMAX) 500 mg tab     predniSONE (DELTASONE) 20 mg tablet     fluticasone furoate-vilanteroL (BREO ELLIPTA) 200-25 mcg/dose inhaler    8.  Sinusitis     Overview      Steam in halations  Saline nasal cleansing  mucinex as needed  abx  Prednisone  F/u in 2 weeks            Relevant Medications     azithromycin (ZITHROMAX) 500 mg tab     predniSONE (DELTASONE) 20 mg tablet     fluticasone furoate-vilanteroL (BREO ELLIPTA) 200-25 mcg/dose inhaler     Other Relevant Orders     REFERRAL TO ENT-OTOLARYNGOLOGY    9. Acute bronchitis - Primary     Overview      Steam in halations  Saline nasal cleansing  mucinex as needed  abx  Prednisone  F/u in 2 weeks            Relevant Medications     azithromycin (ZITHROMAX) 500 mg tab     predniSONE (DELTASONE) 20 mg tablet     fluticasone furoate-vilanteroL (BREO ELLIPTA) 200-25 mcg/dose inhaler                                                                                                                     Orders Placed This Encounter   Medications    predniSONE (DELTASONE) 20 MG tablet     Sig: Take 1 tablet by mouth daily for 5 days     Dispense:  5 tablet     Refill:  0    benzonatate (TESSALON) 100 MG capsule     Sig: Take 1 capsule by mouth 2 times daily as needed for Cough     Dispense:  60 capsule     Refill:  2         Orders Placed This Encounter   Procedures    XR CHEST (2 VW)     Standing Status:   Future     Number of Occurrences:   1     Standing Expiration Date:   5/25/2023    DYAN - Dariel Gold MD, Allergy & Immunology, [place]     Referral Priority:   Routine     Referral Type:   Eval and Treat     Referral Reason:   Specialty Services Required     Referred to Provider:   Leslie Ramirez MD     Requested Specialty:   Allergy and Immunology     Number of Visits Requested:   1  Ambulatory referral to Cardiology     Referral Priority:   Routine     Referral Type:   Eval and Treat     Referral Reason:   Specialty Services Required     Requested Specialty:   Cardiology     Number of Visits Requested:   1     Results for orders placed or performed in visit on 08/31/21   CBC WITH AUTOMATED DIFF   Result Value Ref Range    WBC 6.7 3.4 - 10.8 x10E3/uL    RBC 5.23 3.77 - 5.28 x10E6/uL    HGB 13.9 11.1 - 15.9 g/dL    HCT 44.1 34.0 - 46.6 %    MCV 84 79 - 97 fL    MCH 26.6 26.6 - 33.0 pg    MCHC 31.5 31.5 - 35.7 g/dL    RDW 15.4 11.7 - 15.4 %    PLATELET 826 121 - 536 x10E3/uL    NEUTROPHILS 50 Not Estab. %    Lymphocytes 35 Not Estab. %    MONOCYTES 8 Not Estab. %    EOSINOPHILS 7 Not Estab. %    BASOPHILS 0 Not Estab. %    ABS. NEUTROPHILS 3.3 1.4 - 7.0 x10E3/uL    Abs Lymphocytes 2.4 0.7 - 3.1 x10E3/uL    ABS. MONOCYTES 0.6 0.1 - 0.9 x10E3/uL    ABS. EOSINOPHILS 0.4 0.0 - 0.4 x10E3/uL    ABS. BASOPHILS 0.0 0.0 - 0.2 x10E3/uL    IMMATURE GRANULOCYTES 0 Not Estab. %    ABS. IMM. GRANS. 0.0 0.0 - 0.1 D13X0/DV   METABOLIC PANEL, COMPREHENSIVE   Result Value Ref Range    Glucose 85 65 - 99 mg/dL    BUN 22 8 - 27 mg/dL    Creatinine 1.07 (H) 0.57 - 1.00 mg/dL    GFR est non-AA 52 (L) >59 mL/min/1.73    GFR est AA 60 >59 mL/min/1.73    BUN/Creatinine ratio 21 12 - 28    Sodium 137 134 - 144 mmol/L    Potassium 4.3 3.5 - 5.2 mmol/L    Chloride 105 96 - 106 mmol/L    CO2 22 20 - 29 mmol/L    Calcium 8.7 8.7 - 10.3 mg/dL    Protein, total 7.9 6.0 - 8.5 g/dL    Albumin 4.2 3.7 - 4.7 g/dL    GLOBULIN, TOTAL 3.7 1.5 - 4.5 g/dL    A-G Ratio 1.1 (L) 1.2 - 2.2    Bilirubin, total 0.6 0.0 - 1.2 mg/dL    Alk.  phosphatase 129 (H) 48 - 121 IU/L    AST (SGOT) 37 0 - 40 IU/L    ALT (SGPT) 24 0 - 32 IU/L   LIPID PANEL   Result Value Ref Range    Cholesterol, total 148 100 - 199 mg/dL    Triglyceride 65 0 - 149 mg/dL    HDL Cholesterol 71 >39 mg/dL    VLDL, calculated 13 5 - 40 mg/dL    LDL, calculated 64 0 - 99 mg/dL   T4, FREE   Result Value Ref Range    T4, Free 1.21 0.82 - 1.77 ng/dL   TSH 3RD GENERATION   Result Value Ref Range    TSH 20.900 (H) 0.450 - 4.500 uIU/mL   IRON   Result Value Ref Range    Iron 75 27 - 139 ug/dL   MAGNESIUM   Result Value Ref Range    Magnesium 1.9 1.6 - 2.3 mg/dL     Due to this being a TeleHealth evaluation, many elements of the physical examination are unable to be assessed. We discussed the expected course, resolution and complications of the diagnosis(es) in detail. Medication risks, benefits, costs, interactions, and alternatives were discussed as indicated. I advised her to contact the office if her condition worsens, changes or fails to improve as anticipated. She expressed understanding with the diagnosis(es) and plan. Pursuant to the emergency declaration under the [address] [place] [address] [facility] authority and the [employer] Act, this Virtual  Visit was conducted, with patient's consent, to reduce the patient's risk of exposure to COVID-19 and provide continuity of care for an established patient. Services were provided through a video synchronous discussion virtually to substitute for in-person clinic visit. Follow-up and Dispositions    · Return in about 2 weeks (around 5/25/2022).      f/u on results  Dr Carrera Sers

## 2022-05-27 ENCOUNTER — HOSPITAL ENCOUNTER (OUTPATIENT)
Dept: GENERAL RADIOLOGY | Age: 72
Discharge: HOME | End: 2022-05-30
Payer: OTHER GOVERNMENT

## 2022-05-27 DIAGNOSIS — J84.10 PULMONARY FIBROSIS (HCC): ICD-10-CM

## 2022-05-27 DIAGNOSIS — J67.9 HYPERSENSITIVITY PNEUMONITIS (HCC): ICD-10-CM

## 2022-05-27 DIAGNOSIS — J47.9 BRONCHIECTASIS WITHOUT COMPLICATION (HCC): ICD-10-CM

## 2022-05-27 DIAGNOSIS — J40 BRONCHITIS: ICD-10-CM

## 2022-05-27 DIAGNOSIS — J96.11 CHRONIC RESPIRATORY FAILURE WITH HYPOXIA (HCC): ICD-10-CM

## 2022-05-27 DIAGNOSIS — R05.3 CHRONIC COUGH: ICD-10-CM

## 2022-05-27 PROCEDURE — 71046 X-RAY EXAM CHEST 2 VIEWS: CPT

## 2022-05-31 ENCOUNTER — TELEPHONE (OUTPATIENT)
Dept: PRIMARY CARE CLINIC | Facility: CLINIC | Age: 72
End: 2022-05-31

## 2022-05-31 NOTE — TELEPHONE ENCOUNTER
Pt has inflammation in lungs  Pt also has possible fluid in lungs--ask pt to take lasix daily for next 1 week- is she already taking lasix daily ?  Please check

## 2022-06-02 ENCOUNTER — TELEPHONE (OUTPATIENT)
Dept: PRIMARY CARE CLINIC | Facility: CLINIC | Age: 72
End: 2022-06-02

## 2022-06-02 PROBLEM — I50.9 CONGESTIVE HEART FAILURE (HCC): Status: ACTIVE | Noted: 2022-06-02

## 2022-06-02 NOTE — TELEPHONE ENCOUNTER
MD Yovani Milan MA  Pt already on lasix for fluid in lungs     I will refer her to cardiologist [address] [place] [address] --CHF COULD ALSO BE CONTRIBUTING TO HER cough/sob as we see fluid in lungs

## 2022-06-10 PROBLEM — J32.9 SINUSITIS: Status: RESOLVED | Noted: 2022-05-11 | Resolved: 2022-06-10

## 2022-06-29 ENCOUNTER — INITIAL CONSULT (OUTPATIENT)
Dept: CARDIOLOGY CLINIC | Age: 72
End: 2022-06-29
Payer: COMMERCIAL

## 2022-06-29 VITALS
HEART RATE: 72 BPM | SYSTOLIC BLOOD PRESSURE: 132 MMHG | DIASTOLIC BLOOD PRESSURE: 76 MMHG | WEIGHT: 141.1 LBS | BODY MASS INDEX: 26.64 KG/M2 | HEIGHT: 61 IN

## 2022-06-29 DIAGNOSIS — E78.2 MIXED HYPERLIPIDEMIA: ICD-10-CM

## 2022-06-29 DIAGNOSIS — J96.11 CHRONIC RESPIRATORY FAILURE WITH HYPOXIA (HCC): ICD-10-CM

## 2022-06-29 DIAGNOSIS — J84.10 PULMONARY FIBROSIS (HCC): ICD-10-CM

## 2022-06-29 DIAGNOSIS — I51.89 DIASTOLIC DYSFUNCTION: ICD-10-CM

## 2022-06-29 DIAGNOSIS — I50.9 CHRONIC CONGESTIVE HEART FAILURE, UNSPECIFIED HEART FAILURE TYPE (HCC): Primary | ICD-10-CM

## 2022-06-29 DIAGNOSIS — I10 ESSENTIAL HYPERTENSION: ICD-10-CM

## 2022-06-29 DIAGNOSIS — R06.02 SOBOE (SHORTNESS OF BREATH ON EXERTION): ICD-10-CM

## 2022-06-29 PROBLEM — R68.89 FORGETFULNESS: Status: RESOLVED | Noted: 2021-06-01 | Resolved: 2022-06-29

## 2022-06-29 PROBLEM — Z91.09 ENVIRONMENTAL ALLERGIES: Status: RESOLVED | Noted: 2019-09-23 | Resolved: 2022-06-29

## 2022-06-29 PROBLEM — Z00.00 PHYSICAL EXAM: Status: RESOLVED | Noted: 2019-04-10 | Resolved: 2022-06-29

## 2022-06-29 PROBLEM — R65.21 SEPTIC SHOCK (HCC): Status: RESOLVED | Noted: 2020-08-15 | Resolved: 2022-06-29

## 2022-06-29 PROBLEM — A41.9 SEPTIC SHOCK (HCC): Status: RESOLVED | Noted: 2020-08-15 | Resolved: 2022-06-29

## 2022-06-29 PROBLEM — Z23 ENCOUNTER FOR IMMUNIZATION: Status: RESOLVED | Noted: 2019-04-10 | Resolved: 2022-06-29

## 2022-06-29 PROCEDURE — 1123F ACP DISCUSS/DSCN MKR DOCD: CPT | Performed by: INTERNAL MEDICINE

## 2022-06-29 PROCEDURE — 99204 OFFICE O/P NEW MOD 45 MIN: CPT | Performed by: INTERNAL MEDICINE

## 2022-06-29 PROCEDURE — 93000 ELECTROCARDIOGRAM COMPLETE: CPT | Performed by: INTERNAL MEDICINE

## 2022-06-29 ASSESSMENT — ENCOUNTER SYMPTOMS
EYE REDNESS: 0
HEMATOCHEZIA: 0
HEMATEMESIS: 0
WHEEZING: 0
ABDOMINAL PAIN: 0
HEMOPTYSIS: 0
DOUBLE VISION: 0
HOARSE VOICE: 0
STRIDOR: 0

## 2022-06-29 NOTE — PROGRESS NOTES
[address], [address]  PHONE: [phone]          22    NAME:  Ivonne Umanzor  : 1950  MRN: [id number]         SUBJECTIVE:   Ivonne Umanzor is a 67 y.o. female seen for a visit regarding the following:     Chief Complaint   Patient presents with    Consultation     per Dr. Yeison Nuñez           HPI:      Cardio problem list:  1. Dyspnea exertion/chronic hypoxic respiratory failure  2. Pulmonary fibrosis-follows with pulmonary  3. Diastolic dysfunction  Echo from [facility] on 2021 shows an EF remains hyperdynamic at greater than 65% with mild concentric LVH, mild mitral regurgitation,  4. Hypertension  5. Hyperlipidemia    Dear Dr Yeison Nuñez,  I saw Ms Erik chong 79-year-old woman cardiovascular consultation on 2022 for dyspnea on exertion with concern for underlying congestive heart failure. She has  Pulmonary fibrosis with hypertension and hyperlipidemia. She had an echo done at [facility] in 2021 which showed a 9-week LV function with an EF greater than 65%, some mild concentric LVH and mild mitral regurgitation. There is no significant mention of pulmonary hypertension but this was suspected from underlying pulmonary fibrosis. Dyspnea on exertion/pulmonary fibrosis-she feels that it has been progressive over the past year and a half. She has tried inhalers but does not see much improvement. Oxygen definitely helps her dyspnea. Has occasional lower extremity edema but no obvious orthopnea or PND. Has been started on Lasix 20 mg daily with no significant improvement and she takes this with potassium supplementation. Hypertension: Has been well controlled on current therapy with metoprolol and Lasix and denies any headaches or blurry vision. Hyperlipidemia-remains on atorvastatin therapy with no significant myalgias. Denies any anginal chest discomfort in any way.   Denies significant palpitations or presyncope or syncope or TIAs or strokelike symptoms. Past Medical History, Past Surgical History, Family history, Social History, and Medications were all reviewed with the patient today and updated as necessary.      Allergies   Allergen Reactions    Hydrochlorothiazide Other (See Comments)     hypokalemia    Lisinopril Other (See Comments)     Patient Active Problem List   Diagnosis    Gallstones    Prediabetes    Pulmonary infiltrate on chest x-ray    O2 dependent    Hypophosphatemia    Osteopenia    Asthma    H/O Hashimoto thyroiditis    Absolute anemia    Gastroenteritis    Frequent falls    Sensorineural hearing loss (SNHL) of both ears    Pneumonia of both lungs due to infectious organism    Low magnesium level    Anxiety    Debility    Allergic rhinitis    Presbycusis of right ear with unrestricted hearing of left ear    Acute respiratory failure (HCC)    Chronic kidney disease    Essential hypertension    Vertigo    Encounter for screening colonoscopy    Abnormal CXR (chest x-ray)    Hyponatremia    Metabolic acidosis    ERIC (acute kidney injury) (HCC)    Unsteady gait    Dyslipidemia    ACP (advance care planning)    Psoriasis    Chronic respiratory failure with hypoxia (HCC)    SOBOE (shortness of breath on exertion)    Thrombocytopenia (HCC)    Heart failure with preserved ejection fraction (HCC)    H/O septic shock    Current mild episode of major depressive disorder without prior episode (HCC)    Chronic cough    Bronchiectasis without complication ([facility] [address]    Encounter for examination following treatment at hospital    Low calcium levels    Diastolic dysfunction    Hypokalemia    Bacteremia due to Streptococcus    Elevated AST (SGOT)    Screening for breast cancer    Arthritis    Hypothyroidism    Type 2 diabetes mellitus without complication, without long-term current use of insulin (HCC)    Pulmonary fibrosis (HCC)    Hypersensitivity pneumonitis (HCC)    Bronchitis    Congestive heart failure (HCC)    Mixed hyperlipidemia     Past Medical History:   Diagnosis Date    Abdominal pain onset 6 weeks ago    more with eating    Anemia     Arthritis     osteo    Asthma     prn inhaler    Bronchiectasis without complication (HCC)     oxygen 2 LPM continuous    Chronic kidney disease     stage 2-     Depression     Diastolic dysfunction     Dyslipidemia     Fatigue     Gallstones     being ruled out per son    H/O echocardiogram 12/2021    Raiza- 12/2021 Echo LVEF 65%    Hashimoto's thyroiditis     Hearing reduced     bilat    Heart failure ([address]     Heart failure with preserved ejection fraction-Echo LVEF 65%    Hypertension     controlled with med    Osteopenia     Prediabetes     no meds---A1C 6.5 4/2022 while hospitalized/ recent hx sepsis- previous A1C    Psoriasis     Pulmonary fibrosis (HCC)     PER NOTE IN CC FROM DR VIRGEN-- PT WAS TOLD THIS IN HER PAST-- HOME 2 AND PRN INHALER    Vertigo     hx-- no t a recent problem     Past Surgical History:   Procedure Laterality Date    ABDOMINOPLASTY      COLONOSCOPY N/A 11/19/2020    COLONOSCOPY/ BMI 22 performed by Rodolfo Sanchez MD [facility] ENDOSCOPY    ERCP  4/7/2021         HYSTERECTOMY (CERVIX STATUS UNKNOWN)      Total    OTHER SURGICAL HISTORY      lung biopsy    SALPINGO-OOPHORECTOMY Bilateral     THYROIDECTOMY      total    UPPER GASTROINTESTINAL ENDOSCOPY  5/4/2022          Family History   Problem Relation Age of Onset    Lung Disease Mother      Social History     Tobacco Use    Smoking status: Never Smoker    Smokeless tobacco: Never Used   Substance Use Topics    Alcohol use: No     Current Outpatient Medications   Medication Sig Dispense Refill    benzonatate (TESSALON) 100 MG capsule Take 1 capsule by mouth 2 times daily as needed for Cough 60 capsule 2    OXYGEN 2 each by Nasal route continuous 2L      B Complex Vitamins (VITAMIN B COMPLEX PO) Take by mouth      albuterol (PROVENTIL) (2.5 MG/3ML) 0.083% nebulizer solution Inhale 2.5 mg into the lungs every 4 hours as needed      albuterol sulfate  (90 Base) MCG/ACT inhaler Inhale 2 puffs into the lungs every 4 hours as needed      atorvastatin (LIPITOR) 20 MG tablet Take 20 mg by mouth daily      furosemide (LASIX) 40 MG tablet Take 20 mg by mouth daily      levothyroxine (SYNTHROID) 50 MCG tablet Take 50 mcg by mouth every morning (before breakfast)      magnesium oxide (MAG-OX) 400 (240 Mg) MG tablet Take 400 mg by mouth daily      metoprolol succinate (TOPROL XL) 50 MG extended release tablet Take 50 mg by mouth daily      potassium chloride (KLOR-CON M) 20 MEQ extended release tablet Take 20 mEq by mouth daily      sertraline (ZOLOFT) 100 MG tablet Take 100 mg by mouth daily      fluticasone-salmeterol (ADVAIR) 250-50 MCG/ACT AEPB diskus inhaler Inhale 1 puff into the lungs 2 times daily (Patient not taking: Reported on 6/29/2022)       No current facility-administered medications for this visit. Review of Systems   Constitutional: Negative for chills and fever. HENT: Negative for ear discharge, hoarse voice and stridor. Eyes: Negative for double vision and redness. Cardiovascular: Positive for dyspnea on exertion and leg swelling. Negative for cyanosis and syncope. Respiratory: Negative for hemoptysis and wheezing. Endocrine: Negative for polydipsia and polyphagia. Hematologic/Lymphatic: Negative for adenopathy. Skin: Negative for itching and rash. Musculoskeletal: Negative for joint swelling and muscle weakness. Gastrointestinal: Negative for abdominal pain, hematemesis and hematochezia. Genitourinary: Negative for flank pain and nocturia. Neurological: Negative for focal weakness and seizures. Psychiatric/Behavioral: Negative for altered mental status and suicidal ideas. Allergic/Immunologic: Negative for hives.        PHYSICAL EXAM:    /76   Pulse 72   Ht 5' 1\" (1.549 m)   Wt 141 lb 1.6 oz (64 kg)   BMI 26.66 kg/m²      Physical Exam    General: Alert and oriented in no acute distress, wearing oxygen by nasal cannula  HEENT: Head is normocephalic, atraumatic, pupils are equal bilaterally, throat appears to be clear  Neck: No significant jugular venous distention no cervical bruits  Cardiovascular: S1 and S2 heard, regular rate and rhythm, no significant murmurs rubs or gallops. Respiratory: Respirations are normal on 2 to 3 L of oxygen by nasal cannula. Coarse/Velcro rales noted throughout all lung fields. Abdomen: Soft, nontender, nondistended, bowel sounds present. Extremities: No cyanosis clubbing or edema  Peripheral pulses: Bilateral radial artery pulses are palpated. Bilateral pedal pulses are well felt. Neuro: No facial droop and no gross focal motor deficits  Lymphatic: No significant cervical lymphadenopathy noted. Musculoskeletal: No significant redness or swelling noted in all exposed joints. Skin: No significant rashes noted the of the exposed regions. Medical problems and test results were reviewed with the patient today. No results found for this or any previous visit (from the past 672 hour(s)). Lab Results   Component Value Date    CHOL 148 09/09/2021    HDL 71 09/09/2021    VLDL 13 09/09/2021   ,hemoglobin, basic metabolic panel,   Lab Results   Component Value Date    TSH 20.900 09/09/2021    ,  Lab Results   Component Value Date     09/09/2021    K 4.3 09/09/2021     09/09/2021    CO2 22 09/09/2021    BUN 22 09/09/2021    GFRAA 60 09/09/2021    GLOB 5.8 09/30/2020    ALT 24 09/09/2021    AST 37 09/09/2021      Lab Results   Component Value Date    LDLCALC 64 09/09/2021      Lab Results   Component Value Date    CREATININE 1.07 (H) 09/09/2021      No results found for this or any previous visit.        ASSESSMENT and PLAN      Chronic congestive heart failure, unspecified heart failure type ([address]  -     EKG 12 lead  -     Transthoracic echocardiogram (TTE) complete with contrast, bubble, strain, and 3D PRN; Future  -Reported history of this although previous echo shows preserved LV function. Rechecking echo to review LV function. Continue Lasix and potassium at current dosing. I have asked her to monitor her weight on a daily basis along with heart rates and blood pressures and keep a log of these. She gained over 2 pounds overnight or 5 pounds gradually over 1 week, she will take additional Lasix and potassium until her weight was back down to baseline. SOBOE (shortness of breath on exertion)  -Likely multifactorial but mainly from underlying pulmonary fibrosis. Getting echo. Diastolic dysfunction  -     Transthoracic echocardiogram (TTE) complete with contrast, bubble, strain, and 3D PRN; Future  -Noted on previous echo. Being rechecked. I do expect some amount of diastolic dysfunction considering that she is 67years of age. Essential hypertension  -     EKG 12 lead  -Reasonably well-controlled on metoprolol succinate 50 mg daily-continue  Chronic respiratory failure with hypoxia (HCC)  -On 2 L of oxygen by nasal cannula-continuously. Pulmonary fibrosis ([address]  -Likely cause for dyspnea. Being monitored by pulmonary  Mixed hyperlipidemia  - Continue atorvastatin therapy. Overall Impression  -She has significant dyspnea on exertion but no true orthopnea or PND. I do believe that most of her dyspnea is related to her pulmonary fibrosis and pulmonary region. She has had a previous echo with some mention of diastolic dysfunction and there is concern for congestive heart failure with previous episodes of lower extremity edema. We will get her through a repeat echo as her last study was over 7 months ago and her symptoms continue to progress.   She has been placed on Lasix and potassium to see if this would help her symptoms and she says that she has not noticed any difference. She says she has been told she has sensitivity pneumonitis but has not really tried an antihistamine. She will try Zyrtec/cetirizine 10 mg once every evening as needed to see if this will make a difference and potentially even if it clears up some of her secretions she might feel better.  -We will go over the results of her echocardiogram that we have ordered with her in detail when we see her in follow-up. If there is any significant cardiomyopathy of any sort, of course we will address this appropriately. Return in about 4 weeks (around 7/27/2022) for tessa matthew.     Thank you for allowing us to participate in the care of your patient. If you have any further questions, please do not hesitate to contact us.   Sincerely,        Jarad Vega MD   6/29/2022

## 2022-06-29 NOTE — PATIENT INSTRUCTIONS
- Follow low-sodium diet. Keep a check on your weight and keep a log of this along with heart rates and blood pressures and bring this in with you when you see us in follow-up.   -Try cetirizine 10 mg once daily-every evening for few days to see if this would help with your breathing.-Generic for Zyrtec

## 2022-07-01 ENCOUNTER — TELEMEDICINE (OUTPATIENT)
Dept: PRIMARY CARE CLINIC | Facility: CLINIC | Age: 72
End: 2022-07-01
Payer: OTHER GOVERNMENT

## 2022-07-01 DIAGNOSIS — J96.11 CHRONIC RESPIRATORY FAILURE WITH HYPOXIA (HCC): Primary | ICD-10-CM

## 2022-07-01 DIAGNOSIS — J67.9 HYPERSENSITIVITY PNEUMONITIS (HCC): ICD-10-CM

## 2022-07-01 DIAGNOSIS — E03.9 HYPOTHYROIDISM, UNSPECIFIED TYPE: ICD-10-CM

## 2022-07-01 DIAGNOSIS — Z99.81 O2 DEPENDENT: ICD-10-CM

## 2022-07-01 DIAGNOSIS — E78.5 HYPERLIPIDEMIA, UNSPECIFIED HYPERLIPIDEMIA TYPE: ICD-10-CM

## 2022-07-01 DIAGNOSIS — E83.51 LOW CALCIUM LEVELS: ICD-10-CM

## 2022-07-01 DIAGNOSIS — J84.10 PULMONARY FIBROSIS (HCC): ICD-10-CM

## 2022-07-01 DIAGNOSIS — I50.32 CHRONIC HEART FAILURE WITH PRESERVED EJECTION FRACTION (HCC): ICD-10-CM

## 2022-07-01 DIAGNOSIS — R73.03 PREDIABETES: ICD-10-CM

## 2022-07-01 DIAGNOSIS — I10 ESSENTIAL HYPERTENSION: ICD-10-CM

## 2022-07-01 DIAGNOSIS — F32.0 CURRENT MILD EPISODE OF MAJOR DEPRESSIVE DISORDER WITHOUT PRIOR EPISODE (HCC): ICD-10-CM

## 2022-07-01 DIAGNOSIS — E83.42 HYPOMAGNESEMIA: ICD-10-CM

## 2022-07-01 DIAGNOSIS — J47.9 BRONCHIECTASIS WITHOUT COMPLICATION (HCC): ICD-10-CM

## 2022-07-01 PROCEDURE — 1123F ACP DISCUSS/DSCN MKR DOCD: CPT | Performed by: FAMILY MEDICINE

## 2022-07-01 PROCEDURE — 99214 OFFICE O/P EST MOD 30 MIN: CPT | Performed by: FAMILY MEDICINE

## 2022-07-01 RX ORDER — ATORVASTATIN CALCIUM 20 MG/1
20 TABLET, FILM COATED ORAL DAILY
Qty: 90 TABLET | Refills: 0 | Status: SHIPPED | OUTPATIENT
Start: 2022-07-01 | End: 2022-09-22 | Stop reason: SDUPTHER

## 2022-07-01 RX ORDER — SERTRALINE HYDROCHLORIDE 100 MG/1
100 TABLET, FILM COATED ORAL DAILY
Qty: 90 TABLET | Refills: 0 | Status: SHIPPED | OUTPATIENT
Start: 2022-07-01 | End: 2022-09-22 | Stop reason: SDUPTHER

## 2022-07-01 RX ORDER — LANOLIN ALCOHOL/MO/W.PET/CERES
400 CREAM (GRAM) TOPICAL DAILY
Qty: 90 TABLET | Refills: 1 | Status: SHIPPED | OUTPATIENT
Start: 2022-07-01 | End: 2022-09-22 | Stop reason: SDUPTHER

## 2022-07-01 ASSESSMENT — PATIENT HEALTH QUESTIONNAIRE - PHQ9
SUM OF ALL RESPONSES TO PHQ QUESTIONS 1-9: 0
9. THOUGHTS THAT YOU WOULD BE BETTER OFF DEAD, OR OF HURTING YOURSELF: 0
7. TROUBLE CONCENTRATING ON THINGS, SUCH AS READING THE NEWSPAPER OR WATCHING TELEVISION: 0
10. IF YOU CHECKED OFF ANY PROBLEMS, HOW DIFFICULT HAVE THESE PROBLEMS MADE IT FOR YOU TO DO YOUR WORK, TAKE CARE OF THINGS AT HOME, OR GET ALONG WITH OTHER PEOPLE: 0
1. LITTLE INTEREST OR PLEASURE IN DOING THINGS: 0
SUM OF ALL RESPONSES TO PHQ QUESTIONS 1-9: 0
SUM OF ALL RESPONSES TO PHQ QUESTIONS 1-9: 0
5. POOR APPETITE OR OVEREATING: 0
SUM OF ALL RESPONSES TO PHQ9 QUESTIONS 1 & 2: 0
2. FEELING DOWN, DEPRESSED OR HOPELESS: 0
6. FEELING BAD ABOUT YOURSELF - OR THAT YOU ARE A FAILURE OR HAVE LET YOURSELF OR YOUR FAMILY DOWN: 0
4. FEELING TIRED OR HAVING LITTLE ENERGY: 0
3. TROUBLE FALLING OR STAYING ASLEEP: 0
SUM OF ALL RESPONSES TO PHQ QUESTIONS 1-9: 0
8. MOVING OR SPEAKING SO SLOWLY THAT OTHER PEOPLE COULD HAVE NOTICED. OR THE OPPOSITE, BEING SO FIGETY OR RESTLESS THAT YOU HAVE BEEN MOVING AROUND A LOT MORE THAN USUAL: 0

## 2022-07-01 ASSESSMENT — ENCOUNTER SYMPTOMS
ALLERGIC/IMMUNOLOGIC NEGATIVE: 1
COUGH: 1
GASTROINTESTINAL NEGATIVE: 1
EYES NEGATIVE: 1

## 2022-07-01 NOTE — PROGRESS NOTES
disease    Essential hypertension    Vertigo    Encounter for screening colonoscopy    Abnormal CXR (chest x-ray)    Hyponatremia    Metabolic acidosis    ERIC [facility] injury) ([facility] [address]    Unsteady gait    Dyslipidemia    ACP (advance care planning)    Psoriasis    Chronic respiratory failure with hypoxia (HCC)    SOBOE (shortness of breath on exertion)    Thrombocytopenia (HCC)    Heart failure with preserved ejection fraction (HCC)    H/O septic shock    Current mild episode of major depressive disorder without prior episode (HCC)    Chronic cough    Bronchiectasis without complication ([facility] [address]    Encounter for examination following treatment at hospital    Low calcium levels    Diastolic dysfunction    Hypokalemia    Bacteremia due to Streptococcus    Elevated AST (SGOT)    Screening for breast cancer    Arthritis    Hypothyroidism    Type 2 diabetes mellitus without complication, without long-term current use of insulin (HCC)    Pulmonary fibrosis (HCC)    Hypersensitivity pneumonitis (HCC)    Bronchitis    Congestive heart failure (HCC)    Hyperlipidemia    Hypomagnesemia       Past Medical History:   Diagnosis Date    Abdominal pain onset 6 weeks ago    more with eating    Anemia     Arthritis     osteo    Asthma     prn inhaler    Bronchiectasis without complication (HCC)     oxygen 2 LPM continuous    Chronic kidney disease     stage 2-     Depression     Diastolic dysfunction     Dyslipidemia     Fatigue     Gallstones     being ruled out per son    H/O echocardiogram 12/2021    Raiza- 12/2021 Echo LVEF 65%    Hashimoto's thyroiditis     Hearing reduced     bilat    Heart failure (HCC)     Heart failure with preserved ejection fraction-Echo LVEF 65%    Hypertension     controlled with med    Osteopenia     Prediabetes     no meds---A1C 6.5 4/2022 while hospitalized/ recent hx sepsis- previous A1C    Psoriasis     Pulmonary fibrosis (HCC)     PER NOTE IN CC FROM DR VIRGEN-- PT WAS TOLD THIS IN HER PAST-- HOME 2 AND PRN INHALER    Vertigo     hx-- no t a recent problem       Past Surgical History:   Procedure Laterality Date    ABDOMINOPLASTY      COLONOSCOPY N/A 11/19/2020    COLONOSCOPY/ BMI 22 performed by Kristie Villasenor [facility] at [facility] ENDOSCOPY    ERCP  4/7/2021         HYSTERECTOMY (CERVIX STATUS UNKNOWN)      Total    OTHER SURGICAL HISTORY      lung biopsy    SALPINGO-OOPHORECTOMY Bilateral     THYROIDECTOMY      total    UPPER GASTROINTESTINAL ENDOSCOPY  5/4/2022            Social History     Socioeconomic History    Marital status:      Spouse name: Not on file    Number of children: Not on file    Years of education: Not on file    Highest education level: Not on file   Occupational History    Not on file   Tobacco Use    Smoking status: Never Smoker    Smokeless tobacco: Never Used   Substance and Sexual Activity    Alcohol use: No    Drug use: No    Sexual activity: Not on file   Other Topics Concern    Not on file   Social History Narrative    Not on file     Social Determinants of Health     Financial Resource Strain:     Difficulty of Paying Living Expenses: Not on file   Food Insecurity:     Worried About Running Out of Food in the Last Year: Not on file    Issac of Food in the Last Year: Not on file   Transportation Needs:     Lack of Transportation (Medical): Not on file    Lack of Transportation (Non-Medical):  Not on file   Physical Activity:     Days of Exercise per Week: Not on file    Minutes of Exercise per Session: Not on file   Stress:     Feeling of Stress : Not on file   Social Connections:     Frequency of Communication with Friends and Family: Not on file    Frequency of Social Gatherings with Friends and Family: Not on file    Attends Confucianism Services: Not on file    Active Member of Clubs or Organizations: Not on file    Attends Club or Organization Meetings: Not on file    Marital Status: Not on file   Intimate Partner Violence:     Fear of Current or Ex-Partner: Not on file    Emotionally Abused: Not on file    Physically Abused: Not on file    Sexually Abused: Not on file   Housing Stability:     Unable to Pay for Housing in the Last Year: Not on file    Number of Cristin in the Last Year: Not on file    Unstable Housing in the Last Year: Not on file       Allergies   Allergen Reactions    Hydrochlorothiazide Other (See Comments)     hypokalemia    Lisinopril Other (See Comments)       Current Outpatient Medications   Medication Sig Dispense Refill    atorvastatin (LIPITOR) 20 MG tablet Take 1 tablet by mouth daily 90 tablet 0    magnesium oxide (MAG-OX) 400 (240 Mg) MG tablet Take 1 tablet by mouth daily 90 tablet 1    sertraline (ZOLOFT) 100 MG tablet Take 1 tablet by mouth daily 90 tablet 0    benzonatate (TESSALON) 100 MG capsule Take 1 capsule by mouth 2 times daily as needed for Cough 60 capsule 2    OXYGEN 2 each by Nasal route continuous 2L      B Complex Vitamins (VITAMIN B COMPLEX PO) Take by mouth      albuterol (PROVENTIL) (2.5 MG/3ML) 0.083% nebulizer solution Inhale 2.5 mg into the lungs every 4 hours as needed      albuterol sulfate  (90 Base) MCG/ACT inhaler Inhale 2 puffs into the lungs every 4 hours as needed      fluticasone-salmeterol (ADVAIR) 250-50 MCG/ACT AEPB diskus inhaler Inhale 1 puff into the lungs 2 times daily       furosemide (LASIX) 40 MG tablet Take 20 mg by mouth daily      levothyroxine (SYNTHROID) 50 MCG tablet Take 50 mcg by mouth every morning (before breakfast)      metoprolol succinate (TOPROL XL) 50 MG extended release tablet Take 50 mg by mouth daily      potassium chloride (KLOR-CON M) 20 MEQ extended release tablet Take 20 mEq by mouth daily       No current facility-administered medications for this visit. Review of Systems   Constitutional: Negative. HENT: Positive for congestion. Eyes: Negative. Respiratory: Positive for cough. Gastrointestinal: Negative. Endocrine: Negative. Genitourinary: Negative. Musculoskeletal: Negative. Skin: Negative. Allergic/Immunologic: Negative. Neurological: Negative. Hematological: Negative. Psychiatric/Behavioral: Negative. Objective: There were no vitals taken for this visit. Physical Exam  Constitutional:       Appearance: Normal appearance. HENT:      Head: Normocephalic and atraumatic. Right Ear: External ear normal.      Left Ear: External ear normal.      Nose: Nose normal.      Mouth/Throat:      Mouth: Mucous membranes are moist.   Eyes:      Extraocular Movements: Extraocular movements intact. Conjunctiva/sclera: Conjunctivae normal.   Pulmonary:      Effort: Pulmonary effort is normal.   Neurological:      Mental Status: She is alert. Psychiatric:         Mood and Affect: Mood normal.         Behavior: Behavior normal.         Thought Content: Thought content normal.            ASSESSMENT/PLAN:    1. Chronic respiratory failure with hypoxia (HCC)  Overview:  o2      Orders:  -     Comprehensive Metabolic Panel; Future  -     Hemoglobin A1C; Future  -     Lipid Panel; Future  -     CBC with Auto Differential; Future  -     T4, Free; Future  -     TSH; Future  -     Magnesium; Future  -     Calcium, Ionized; Future  -     PTH, Intact; Future  2. Bronchiectasis without complication ([facility] [address]  Overview:  wixwla did not help  Try only steroid inhaler      Orders:  -     Comprehensive Metabolic Panel; Future  -     Hemoglobin A1C; Future  -     Lipid Panel; Future  -     CBC with Auto Differential; Future  -     T4, Free; Future  -     TSH; Future  -     Magnesium; Future  -     Calcium, Ionized; Future  -     PTH, Intact; Future  3. Pulmonary fibrosis (HCC)  -     Comprehensive Metabolic Panel; Future  -     Hemoglobin A1C; Future  -     Lipid Panel;  Future  -     CBC with Auto Differential; Future  -     T4, Free; Future  -     TSH; Future  -     Magnesium; Future  -     Calcium, Ionized; Future  -     PTH, Intact; Future  4. Hypersensitivity pneumonitis ([facility] [address]  Overview:  Sees pulmonologist  From aspergillus  On steroid inlaler      Orders:  -     Comprehensive Metabolic Panel; Future  -     Hemoglobin A1C; Future  -     Lipid Panel; Future  -     CBC with Auto Differential; Future  -     T4, Free; Future  -     TSH; Future  -     Magnesium; Future  -     Calcium, Ionized; Future  -     PTH, Intact; Future  5. Chronic heart failure with preserved ejection fraction (HCC)  -     Comprehensive Metabolic Panel; Future  -     Hemoglobin A1C; Future  -     Lipid Panel; Future  -     CBC with Auto Differential; Future  -     T4, Free; Future  -     TSH; Future  -     Magnesium; Future  -     Calcium, Ionized; Future  -     PTH, Intact; Future  6. O2 dependent  Overview:  2l now      Orders:  -     Comprehensive Metabolic Panel; Future  -     Hemoglobin A1C; Future  -     Lipid Panel; Future  -     CBC with Auto Differential; Future  -     T4, Free; Future  -     TSH; Future  -     Magnesium; Future  -     Calcium, Ionized; Future  -     PTH, Intact; Future  7. Prediabetes  Overview:  Off metformin 9/2020 since blood sugars are low      Orders:  -     Comprehensive Metabolic Panel; Future  -     Hemoglobin A1C; Future  -     Lipid Panel; Future  -     CBC with Auto Differential; Future  -     T4, Free; Future  -     TSH; Future  -     Magnesium; Future  -     Calcium, Ionized; Future  -     PTH, Intact; Future  8. Hypothyroidism, unspecified type  Overview:  Stable on levothyroxine  9/2021  TSH is high  Will increase levo to 75 mcg  1/2022  TSH low--levo decreased to 50 mcg      Orders:  -     Comprehensive Metabolic Panel; Future  -     Hemoglobin A1C; Future  -     Lipid Panel; Future  -     CBC with Auto Differential; Future  -     T4, Free; Future  -     TSH; Future  -     Magnesium; Future  -     Calcium, Ionized;  Future  - PTH, Intact; Future  9. Essential hypertension  Overview:  Stable on amlodipine and metoprolol  Stable with med  Refilled  Labs   Annual eye exam recommended  F/u in 3 months  2/2021  Restarted metoprolol 25 mg half pill daily  Call in 2 weeks with BP log  4/2021  mettoprolol 25 mg 1.5 pills daily ameya as BP up from steroid use  2022  Metoprolol 50 mg -half pill    Orders:  -     Comprehensive Metabolic Panel; Future  -     Hemoglobin A1C; Future  -     Lipid Panel; Future  -     CBC with Auto Differential; Future  -     T4, Free; Future  -     TSH; Future  -     Magnesium; Future  -     Calcium, Ionized; Future  -     PTH, Intact; Future  10. Current mild episode of major depressive disorder without prior episode (HCC)  Overview:  Stable on zoloft      Orders:  -     sertraline (ZOLOFT) 100 MG tablet; Take 1 tablet by mouth daily, Disp-90 tablet, R-0Normal  -     Comprehensive Metabolic Panel; Future  -     Hemoglobin A1C; Future  -     Lipid Panel; Future  -     CBC with Auto Differential; Future  -     T4, Free; Future  -     TSH; Future  -     Magnesium; Future  -     Calcium, Ionized; Future  -     PTH, Intact; Future  11. Low calcium levels  Overview: In hospitl  Recheck labs today      Orders:  -     Comprehensive Metabolic Panel; Future  -     Hemoglobin A1C; Future  -     Lipid Panel; Future  -     CBC with Auto Differential; Future  -     T4, Free; Future  -     TSH; Future  -     Magnesium; Future  -     Calcium, Ionized; Future  -     PTH, Intact; Future  12. Hyperlipidemia, unspecified hyperlipidemia type  Comments:  on statin  Orders:  -     atorvastatin (LIPITOR) 20 MG tablet; Take 1 tablet by mouth daily, Disp-90 tablet, R-0Normal  -     Comprehensive Metabolic Panel; Future  -     Hemoglobin A1C; Future  -     Lipid Panel; Future  -     CBC with Auto Differential; Future  -     T4, Free; Future  -     TSH; Future  -     Magnesium; Future  -     Calcium, Ionized;  Future  -     PTH, Intact; Future  13. Hypomagnesemia  Comments:  on supplement  Overview:  on supplement    Orders:  -     magnesium oxide (MAG-OX) 400 (240 Mg) MG tablet; Take 1 tablet by mouth daily, Disp-90 tablet, R-1Normal  -     Comprehensive Metabolic Panel; Future  -     Hemoglobin A1C; Future  -     Lipid Panel; Future  -     CBC with Auto Differential; Future  -     T4, Free; Future  -     TSH; Future  -     Magnesium; Future  -     Calcium, Ionized; Future  -     PTH, Intact;  Future         Orders Placed This Encounter   Procedures    Comprehensive Metabolic Panel     Standing Status:   Future     Standing Expiration Date:   7/1/2023    Hemoglobin A1C     Standing Status:   Future     Standing Expiration Date:   7/1/2023    Lipid Panel     Standing Status:   Future     Standing Expiration Date:   7/1/2023     Order Specific Question:   Is Patient Fasting?/# of Hours     Answer:   0    CBC with Auto Differential     Standing Status:   Future     Standing Expiration Date:   7/1/2023    T4, Free     Standing Status:   Future     Standing Expiration Date:   7/1/2023    TSH     Standing Status:   Future     Standing Expiration Date:   7/1/2023    Magnesium     Standing Status:   Future     Standing Expiration Date:   7/1/2023    Calcium, Ionized     Standing Status:   Future     Standing Expiration Date:   7/1/2023    PTH, Intact     Standing Status:   Future     Standing Expiration Date:   7/1/2023        Orders Placed This Encounter   Medications    atorvastatin (LIPITOR) 20 MG tablet     Sig: Take 1 tablet by mouth daily     Dispense:  90 tablet     Refill:  0    magnesium oxide (MAG-OX) 400 (240 Mg) MG tablet     Sig: Take 1 tablet by mouth daily     Dispense:  90 tablet     Refill:  1    sertraline (ZOLOFT) 100 MG tablet     Sig: Take 1 tablet by mouth daily     Dispense:  90 tablet     Refill:  0      CT Result (most recent):  CT CHEST WO CONTRAST 04/09/2021    Narrative  CT CHEST WITHOUT CONTRAST (HRCT) DATED 4/9/2021. History: Hypersensitivity pneumonitis. Comparison: CT chest without contrast 11/27/2020    Technique:   Multiple contiguous helical CT images reconstructed at 5 mm were  obtained from the neck base to the mid abdomen without the administration of  contrast. Additional thin section, axial high-resolution CT images were obtained  with the patient in the prone and supine position. All CT scans performed at  this facility use one or all of the following: Automated exposure control,  adjustment of the mA and/or kVp according to patient's size, iterative  reconstruction. Findings:  CT Chest:  The base of the neck is unremarkable in appearance. No evolving axillary, or  mediastinal lymphadenopathy is seen. Evaluation of the paris is limited due to  noncontrast technique although no obvious bulky hilar changes are seen. The  thoracic aorta is normal in caliber. Evaluation with lung windows demonstrates persistent parenchymal changes. Today's high resolution chest CT included prone and supine imaging without  inhalational and exhalational imaging as performed on the prior exam. Today's  images are more consistent with exhalational images obtained on the prior exam.  Areas of persistent air trapping are suggested most evident in the periphery of  the upper lobes and lung bases. Regional areas of groundglass attenuation and  interstitial septal thickening are seen which are not significantly changed. Persistent bronchiectatic changes are seen most evident in the right middle lobe  where cystic bronchiectatic appearing changes which are not significantly  changed from the prior study. A stable right lower lobe suture line is seen to  just had prior wedge resection. No pleural effusion is seen. Lungs are expanded  without evidence for pneumothorax. No acute osseous abnormality is seen. Limited evaluation of the upper abdomen demonstrates no acute abnormality.  Mild  pneumobilia is now seen without abnormal evolving bowel dilation likely related  to a recent ERCP. Nodular densities are seen in the samira hepatis suboptimally  assessed. These could either represent lymph nodes or sequela of cavernous  transformation of the portal vein. Impression  1. Parenchymal changes which are not significantly changed from a prior  comparison study whose imaging features are consistent with hypersensitivity  pneumonitis. No superimposed acute process is suggested. 2. Nodular densities in the samira hepatis which could either represent prominent  lymph nodes or cavernous transformation of the portal vein. New mild pneumobilia  is seen likely related to recent ERCP. This report was made using voice transcription. Despite my best efforts to avoid  any, transcription errors may persist. If there is any question about the  accuracy of the report or need for clarification, then please call [phone], or text me through [employer] for clarification or correction. No results found for any visits on 07/01/22.    Lab Results   Component Value Date     09/09/2021    K 4.3 09/09/2021     09/09/2021    CO2 22 09/09/2021    BUN 22 09/09/2021    CREATININE 1.07 (H) 09/09/2021    GLUCOSE 85 09/09/2021    CALCIUM 8.7 09/09/2021    PROT 7.9 09/09/2021    LABALBU 4.2 09/09/2021    BILITOT 0.6 09/09/2021    ALKPHOS 129 (H) 09/09/2021    AST 37 09/09/2021    ALT 24 09/09/2021    GFRAA 60 09/09/2021    AGRATIO 1.1 (L) 09/09/2021    GLOB 5.8 (H) 09/30/2020     Lab Results   Component Value Date    WBC 6.7 09/09/2021    HGB 13.9 09/09/2021    HCT 44.1 09/09/2021    MCV 84 09/09/2021     09/09/2021     Lab Results   Component Value Date    LABA1C 6.5 (H) 04/01/2021     Lab Results   Component Value Date     04/01/2021     Lab Results   Component Value Date    CHOL 148 09/09/2021    CHOL 228 09/30/2020    CHOL 148 07/02/2019     Lab Results   Component Value Date    TRIG 65 09/09/2021    TRIG 119

## 2022-07-12 ENCOUNTER — TELEMEDICINE (OUTPATIENT)
Dept: PRIMARY CARE CLINIC | Facility: CLINIC | Age: 72
End: 2022-07-12
Payer: OTHER GOVERNMENT

## 2022-07-12 DIAGNOSIS — Z99.81 O2 DEPENDENT: ICD-10-CM

## 2022-07-12 DIAGNOSIS — R05.3 CHRONIC COUGH: ICD-10-CM

## 2022-07-12 DIAGNOSIS — J96.11 CHRONIC RESPIRATORY FAILURE WITH HYPOXIA (HCC): Primary | ICD-10-CM

## 2022-07-12 DIAGNOSIS — J40 BRONCHITIS: ICD-10-CM

## 2022-07-12 PROCEDURE — 99213 OFFICE O/P EST LOW 20 MIN: CPT | Performed by: FAMILY MEDICINE

## 2022-07-12 PROCEDURE — 1123F ACP DISCUSS/DSCN MKR DOCD: CPT | Performed by: FAMILY MEDICINE

## 2022-07-12 RX ORDER — PREDNISONE 20 MG/1
20 TABLET ORAL DAILY
Qty: 5 TABLET | Refills: 0 | Status: SHIPPED | OUTPATIENT
Start: 2022-07-12 | End: 2022-07-17

## 2022-07-12 RX ORDER — AZITHROMYCIN 500 MG/1
500 TABLET, FILM COATED ORAL DAILY
Qty: 5 TABLET | Refills: 0 | Status: SHIPPED | OUTPATIENT
Start: 2022-07-12 | End: 2022-07-17

## 2022-07-12 ASSESSMENT — PATIENT HEALTH QUESTIONNAIRE - PHQ9
SUM OF ALL RESPONSES TO PHQ QUESTIONS 1-9: 7
SUM OF ALL RESPONSES TO PHQ QUESTIONS 1-9: 7
10. IF YOU CHECKED OFF ANY PROBLEMS, HOW DIFFICULT HAVE THESE PROBLEMS MADE IT FOR YOU TO DO YOUR WORK, TAKE CARE OF THINGS AT HOME, OR GET ALONG WITH OTHER PEOPLE: 1
7. TROUBLE CONCENTRATING ON THINGS, SUCH AS READING THE NEWSPAPER OR WATCHING TELEVISION: 0
4. FEELING TIRED OR HAVING LITTLE ENERGY: 2
6. FEELING BAD ABOUT YOURSELF - OR THAT YOU ARE A FAILURE OR HAVE LET YOURSELF OR YOUR FAMILY DOWN: 0
SUM OF ALL RESPONSES TO PHQ QUESTIONS 1-9: 7
9. THOUGHTS THAT YOU WOULD BE BETTER OFF DEAD, OR OF HURTING YOURSELF: 0
2. FEELING DOWN, DEPRESSED OR HOPELESS: 2
1. LITTLE INTEREST OR PLEASURE IN DOING THINGS: 1
SUM OF ALL RESPONSES TO PHQ9 QUESTIONS 1 & 2: 3
8. MOVING OR SPEAKING SO SLOWLY THAT OTHER PEOPLE COULD HAVE NOTICED. OR THE OPPOSITE, BEING SO FIGETY OR RESTLESS THAT YOU HAVE BEEN MOVING AROUND A LOT MORE THAN USUAL: 0
5. POOR APPETITE OR OVEREATING: 0
SUM OF ALL RESPONSES TO PHQ QUESTIONS 1-9: 7
3. TROUBLE FALLING OR STAYING ASLEEP: 2

## 2022-07-12 NOTE — PROGRESS NOTES
Dru Webb (:  1950) is a Established patient, here for evaluation of the following:    Assessment & Plan   Below is the assessment and plan developed based on review of pertinent history, physical exam, labs, studies, and medications. 1. Chronic respiratory failure with hypoxia (HCC)  -     predniSONE (DELTASONE) 20 MG tablet; Take 1 tablet by mouth daily for 5 days, Disp-5 tablet, R-0Normal  -     azithromycin (ZITHROMAX) 500 MG tablet; Take 1 tablet by mouth daily for 5 days, Disp-5 tablet, R-0Normal  2. O2 dependent  -     predniSONE (DELTASONE) 20 MG tablet; Take 1 tablet by mouth daily for 5 days, Disp-5 tablet, R-0Normal  -     azithromycin (ZITHROMAX) 500 MG tablet; Take 1 tablet by mouth daily for 5 days, Disp-5 tablet, R-0Normal  3. Chronic cough  4. Bronchitis  -     predniSONE (DELTASONE) 20 MG tablet; Take 1 tablet by mouth daily for 5 days, Disp-5 tablet, R-0Normal  -     azithromycin (ZITHROMAX) 500 MG tablet; Take 1 tablet by mouth daily for 5 days, Disp-5 tablet, R-0Normal    Use medication as prescribed, SE informed. Nature and course of illness reviewed. Supportive care including OTC meds, increase in fluids, and rest reviewed. Advised to closely monitor pulse ox. Advised to keep it above 88 and if needed to increase oxygen concentration. Call office/RTC if any s/s worsen or do not improve. Advised to follow up with pulmonologist and allergy specialist as scheduled, and to call if symptoms does not improve. Pt and her  agreed. Return if symptoms worsen or fail to improve, for Follow up as scheduled. Subjective   Upper Respiratory Symptoms  Patient is a 68 yo female who presents today for VV with her significant other/partner/. He gives complete history regarding her health and condition. He states that she has been sick for past few days. she complains of cold symptoms.  Patient complains of associated symptoms: nasal congestion, nausea without vomiting, and non productive cough. Onset/Duration of symptoms was a few days ago, Timing: Symptoms are  unchanged since they started. Severity: Patient's symptoms are moderate. Treatment to date: oral decongestant, cough suppressant.  states that she has been through a lot, and has been in the hospital for sepsis. He is worried that the cold symptoms will become bronchitis again, and is requesting treatment today. He states that her last antibiotic was several months ago. He stated that her pulse ox has been at 88 with oxygen. Follows with pulmonologist. Denies all other ROS. No other concerns or complaints. Review of Systems   Constitutional:  Positive for fatigue and fever. Negative for appetite change, chills and unexpected weight change. HENT:  Positive for congestion. Negative for ear discharge, ear pain, postnasal drip, rhinorrhea, sinus pressure, sinus pain, sneezing and sore throat. Eyes:  Negative for pain, redness and visual disturbance. Respiratory:  Positive for cough. Negative for chest tightness, shortness of breath and wheezing. Cardiovascular:  Negative for chest pain, palpitations and leg swelling. Gastrointestinal:  Negative for abdominal pain, blood in stool, constipation, diarrhea, nausea and vomiting. Musculoskeletal:  Negative for arthralgias, back pain, gait problem, neck pain and neck stiffness. Skin:  Negative for rash and wound. Neurological:  Negative for dizziness, tremors, syncope, weakness, numbness and headaches. Psychiatric/Behavioral:  Negative for behavioral problems, confusion, self-injury, sleep disturbance and suicidal ideas. The patient is not nervous/anxious. Denies Depression   All other systems reviewed and are negative.        Objective   Patient-Reported Vitals  No data recorded       Physical Exam  [INSTRUCTIONS:  \"[x]\" Indicates a positive item  \"[]\" Indicates a negative item  -- DELETE ALL ITEMS NOT EXAMINED]    Constitutional: [x] Appears well-developed and well-nourished [x] No apparent distress      [] Abnormal -     Mental status: [x] Alert and awake  [x] Oriented to person/place/time [x] Able to follow commands    [] Abnormal -     Eyes:   EOM    [x]  Normal    [] Abnormal -   Sclera  [x]  Normal    [] Abnormal -          Discharge [x]  None visible   [] Abnormal -     HENT: [x] Normocephalic, atraumatic  [] Abnormal -   [x] Mouth/Throat: Mucous membranes are moist    External Ears [x] Normal  [] Abnormal -    Neck: [x] No visualized mass [] Abnormal -     Pulmonary/Chest: [x] Respiratory effort normal   [x] No visualized signs of difficulty breathing or respiratory distress        [x] Abnormal - On oxygen, nasal canula seen     Musculoskeletal:   [x] Normal gait with no signs of ataxia         [x] Normal range of motion of neck        [] Abnormal -     Neurological:        [x] No Facial Asymmetry (Cranial nerve 7 motor function) (limited exam due to video visit)          [x] No gaze palsy        [] Abnormal -          Skin:        [x] No significant exanthematous lesions or discoloration noted on facial skin         [] Abnormal -            Psychiatric:       [x] Normal Affect [] Abnormal -        [x] No Hallucinations    Other pertinent observable physical exam findings:-         On this date 7/12/2022 I have spent 15 minutes reviewing previous notes, test results and face to face (virtual) with the patient discussing the diagnosis and importance of compliance with the treatment plan as well as documenting on the day of the visit. Tony Thurman, was evaluated through a synchronous (real-time) audio-video encounter. The patient (or guardian if applicable) is aware that this is a billable service, which includes applicable co-pays. This Virtual Visit was conducted with patient's (and/or legal guardian's) consent.  The visit was conducted pursuant to the emergency declaration under the [address] [facility] [address] waiver authority and the [employer] and [employer] Act. Patient identification was verified, and a caregiver was present when appropriate. The patient was located at Home: [address] [place] [address]. Provider was located at [facility] ([address] [place] [address]): [phone] [facility],  [address] [facility] [address]

## 2022-07-18 ENCOUNTER — TELEPHONE (OUTPATIENT)
Dept: PRIMARY CARE CLINIC | Facility: CLINIC | Age: 72
End: 2022-07-18

## 2022-07-18 ASSESSMENT — ENCOUNTER SYMPTOMS
EYE PAIN: 0
SHORTNESS OF BREATH: 0
SINUS PRESSURE: 0
SORE THROAT: 0
SINUS PAIN: 0
RHINORRHEA: 0
ABDOMINAL PAIN: 0
EYE REDNESS: 0
DIARRHEA: 0
CHEST TIGHTNESS: 0
BLOOD IN STOOL: 0
CONSTIPATION: 0
NAUSEA: 0
VOMITING: 0
BACK PAIN: 0
COUGH: 1
WHEEZING: 0

## 2022-07-21 DIAGNOSIS — I10 ESSENTIAL HYPERTENSION: Primary | ICD-10-CM

## 2022-07-21 RX ORDER — METOPROLOL SUCCINATE 50 MG/1
50 TABLET, EXTENDED RELEASE ORAL DAILY
Qty: 90 TABLET | Refills: 0 | Status: SHIPPED | OUTPATIENT
Start: 2022-07-21 | End: 2022-07-26 | Stop reason: SDUPTHER

## 2022-07-26 DIAGNOSIS — I10 ESSENTIAL HYPERTENSION: ICD-10-CM

## 2022-07-26 RX ORDER — METOPROLOL SUCCINATE 50 MG/1
25 TABLET, EXTENDED RELEASE ORAL DAILY
Qty: 90 TABLET | Refills: 0 | Status: SHIPPED | OUTPATIENT
Start: 2022-07-26 | End: 2022-09-22 | Stop reason: SDUPTHER

## 2022-07-29 ENCOUNTER — TELEPHONE (OUTPATIENT)
Dept: PRIMARY CARE CLINIC | Facility: CLINIC | Age: 72
End: 2022-07-29

## 2022-08-01 ENCOUNTER — OFFICE VISIT (OUTPATIENT)
Dept: CARDIOLOGY CLINIC | Age: 72
End: 2022-08-01
Payer: OTHER GOVERNMENT

## 2022-08-01 VITALS
HEIGHT: 61 IN | DIASTOLIC BLOOD PRESSURE: 58 MMHG | HEART RATE: 84 BPM | BODY MASS INDEX: 26.06 KG/M2 | WEIGHT: 138 LBS | SYSTOLIC BLOOD PRESSURE: 122 MMHG

## 2022-08-01 DIAGNOSIS — E78.2 MIXED HYPERLIPIDEMIA: ICD-10-CM

## 2022-08-01 DIAGNOSIS — J96.11 CHRONIC RESPIRATORY FAILURE WITH HYPOXIA (HCC): ICD-10-CM

## 2022-08-01 DIAGNOSIS — I51.89 DIASTOLIC DYSFUNCTION: Primary | ICD-10-CM

## 2022-08-01 DIAGNOSIS — I10 ESSENTIAL HYPERTENSION: ICD-10-CM

## 2022-08-01 DIAGNOSIS — J84.10 PULMONARY FIBROSIS (HCC): ICD-10-CM

## 2022-08-01 PROCEDURE — 1123F ACP DISCUSS/DSCN MKR DOCD: CPT | Performed by: INTERNAL MEDICINE

## 2022-08-01 PROCEDURE — 99214 OFFICE O/P EST MOD 30 MIN: CPT | Performed by: INTERNAL MEDICINE

## 2022-08-01 ASSESSMENT — ENCOUNTER SYMPTOMS
HEMATOCHEZIA: 0
HEMATEMESIS: 0
WHEEZING: 0
DOUBLE VISION: 0
ABDOMINAL PAIN: 0
HOARSE VOICE: 0
STRIDOR: 0
HEMOPTYSIS: 0
EYE REDNESS: 0

## 2022-08-01 NOTE — PROGRESS NOTES
[address], [address]  PHONE: [phone]          22    NAME:  Dru Webb  : 1950  MRN: [id number]         SUBJECTIVE:   Dru Webb is a 67 y.o. female seen for a visit regarding the following:     Chief Complaint   Patient presents with    Hypertension    Hyperlipidemia           HPI:    Cardio problem list:  1. Dyspnea exertion/chronic hypoxic respiratory failure-can Vlad to pulmonary fibrosis  2. Pulmonary fibrosis-follows with pulmonary  3. Diastolic dysfunction  -Echo from 2022 showed an EF at 65 to 70% with no regional wall motion abnormalities, mild concentric LVH, trace mitral regurgitation, mild left atrial enlargement, normal diastolic function for age. No pulmonary hypertension  Echo from [facility] on 2021 shows an EF remains hyperdynamic at greater than 65% with mild concentric LVH, mild mitral regurgitation,  4. Hypertension  5. Hyperlipidemia     Dear Dr Torri Higgins,  I saw Ms Rao chong 69-year-old woman cardiovascular follow-up on 2022. We last saw her in consultation on 2022 for dyspnea on exertion with concern for underlying congestive heart failure. She has Pulmonary fibrosis with hypertension and hyperlipidemia.  -When we last met with her, we reviewed her previous echocardiogram done in [facility] and we set her up for a repeat echocardiogram to ensure that there was no significant change. She had an echo done at [facility] in 2021 which showed hyperdynamic LV function with an EF greater than 65%, some mild concentric LVH and mild mitral regurgitation. There is no significant mention of pulmonary hypertension but this was suspected from underlying pulmonary fibrosis.   -Our echo showed an EF at 65 to 70% with no regional wall motion abnormalities, normal diastolic function for age, no evidence of significant pulmonary hypertension     Dyspnea on exertion/pulmonary fibrosis-she feels that it has been progressive over the past year and a half. She has tried inhalers but does not see much improvement. Oxygen definitely helps her dyspnea. Has occasional lower extremity edema but no obvious orthopnea or PND. Has been started on Lasix 20 mg daily with no significant improvement and she takes this with potassium supplementation.  -I do not think that she would benefit truly from taking long-term Lasix and she can only use it on an as-needed basis. Hypertension: Has been well controlled on current therapy with metoprolol and Lasix and denies any headaches or blurry vision. Hyperlipidemia-remains on atorvastatin therapy with no significant myalgias. Denies any anginal chest discomfort in any way. Denies significant palpitations or presyncope or syncope or TIAs or strokelike symptoms. Past Medical History, Past Surgical History, Family history, Social History, and Medications were all reviewed with the patient today and updated as necessary.      Allergies   Allergen Reactions    Hydrochlorothiazide Other (See Comments)     hypokalemia    Lisinopril Other (See Comments)     Patient Active Problem List   Diagnosis    Gallstones    Prediabetes    Pulmonary infiltrate on chest x-ray    O2 dependent    Hypophosphatemia    Osteopenia    Asthma    H/O Hashimoto thyroiditis    Absolute anemia    Gastroenteritis    Frequent falls    Sensorineural hearing loss (SNHL) of both ears    Pneumonia of both lungs due to infectious organism    Low magnesium level    Anxiety    Debility    Allergic rhinitis    Presbycusis of right ear with unrestricted hearing of left ear    Acute respiratory failure (HCC)    Chronic kidney disease    Essential hypertension    Vertigo    Encounter for screening colonoscopy    Abnormal CXR (chest x-ray)    Hyponatremia    Metabolic acidosis    ERIC (acute kidney injury) ([address]    Unsteady gait    Dyslipidemia    ACP (advance care planning) Psoriasis    Chronic respiratory failure with hypoxia (HCC)    SOBOE (shortness of breath on exertion)    Thrombocytopenia (HCC)    Heart failure with preserved ejection fraction (HCC)    H/O septic shock    Current mild episode of major depressive disorder without prior episode ([address]    Chronic cough    Bronchiectasis without complication ([address]    Encounter for examination following treatment at hospital    Low calcium levels    Diastolic dysfunction    Hypokalemia    Bacteremia due to Streptococcus    Elevated AST (SGOT)    Screening for breast cancer    Arthritis    Hypothyroidism    Type 2 diabetes mellitus without complication, without long-term current use of insulin (HCC)    Pulmonary fibrosis (HCC)    Hypersensitivity pneumonitis (HCC)    Bronchitis    Congestive heart failure (HCC)    Hyperlipidemia    Hypomagnesemia     Past Medical History:   Diagnosis Date    Abdominal pain onset 6 weeks ago    more with eating    Anemia     Arthritis     osteo    Asthma     prn inhaler    Bronchiectasis without complication (HCC)     oxygen 2 LPM continuous    Chronic kidney disease     stage 2-     Depression     Diastolic dysfunction     Dyslipidemia     Fatigue     Gallstones     being ruled out per son    H/O echocardiogram 12/2021    Raiza- 12/2021 Echo LVEF 65%    Hashimoto's thyroiditis     Hearing reduced     bilat    Heart failure ([address]     Heart failure with preserved ejection fraction-Echo LVEF 65%    Hypertension     controlled with med    Osteopenia     Prediabetes     no meds---A1C 6.5 4/2022 while hospitalized/ recent hx sepsis- previous A1C    Psoriasis     Pulmonary fibrosis (HCC)     PER NOTE IN CC FROM DR VIRGEN-- PT WAS TOLD THIS IN HER PAST-- HOME 2 AND PRN INHALER    Vertigo     hx-- no t a recent problem     Past Surgical History:   Procedure Laterality Date    ABDOMINOPLASTY      COLONOSCOPY N/A 11/19/2020    COLONOSCOPY/ BMI 22 performed by Dimitri Nunez MD at [facility] ENDOSCOPY    ERCP  4/7/2021 HYSTERECTOMY (CERVIX STATUS UNKNOWN)      Total    OTHER SURGICAL HISTORY      lung biopsy    SALPINGO-OOPHORECTOMY Bilateral     THYROIDECTOMY      total    UPPER GASTROINTESTINAL ENDOSCOPY  5/4/2022          Family History   Problem Relation Age of Onset    Lung Disease Mother      Social History     Tobacco Use    Smoking status: Never    Smokeless tobacco: Never   Substance Use Topics    Alcohol use: No     Current Outpatient Medications   Medication Sig Dispense Refill    metoprolol succinate (TOPROL XL) 50 MG extended release tablet Take 0.5 tablets by mouth in the morning. ( This is the correct script). 90 tablet 0    atorvastatin (LIPITOR) 20 MG tablet Take 1 tablet by mouth daily 90 tablet 0    magnesium oxide (MAG-OX) 400 (240 Mg) MG tablet Take 1 tablet by mouth daily 90 tablet 1    sertraline (ZOLOFT) 100 MG tablet Take 1 tablet by mouth daily 90 tablet 0    benzonatate (TESSALON) 100 MG capsule Take 1 capsule by mouth 2 times daily as needed for Cough 60 capsule 2    OXYGEN 2 each by Nasal route continuous 2L      B Complex Vitamins (VITAMIN B COMPLEX PO) Take by mouth      albuterol (PROVENTIL) (2.5 MG/3ML) 0.083% nebulizer solution Inhale 2.5 mg into the lungs every 4 hours as needed      albuterol sulfate  (90 Base) MCG/ACT inhaler Inhale 2 puffs into the lungs every 4 hours as needed      furosemide (LASIX) 40 MG tablet Take 20 mg by mouth daily      levothyroxine (SYNTHROID) 50 MCG tablet Take 50 mcg by mouth every morning (before breakfast)      potassium chloride (KLOR-CON M) 20 MEQ extended release tablet Take 20 mEq by mouth daily       No current facility-administered medications for this visit. Review of Systems   Constitutional: Negative for chills and fever. HENT:  Negative for ear discharge, hoarse voice and stridor. Eyes:  Negative for double vision and redness. Cardiovascular:  Positive for dyspnea on exertion. Negative for cyanosis and syncope.    Respiratory: Negative for hemoptysis and wheezing. Endocrine: Negative for polydipsia and polyphagia. Hematologic/Lymphatic: Negative for adenopathy. Skin:  Negative for itching and rash. Musculoskeletal:  Negative for joint swelling and muscle weakness. Gastrointestinal:  Negative for abdominal pain, hematemesis and hematochezia. Genitourinary:  Negative for flank pain and nocturia. Neurological:  Negative for focal weakness and seizures. Psychiatric/Behavioral:  Negative for altered mental status and suicidal ideas. Allergic/Immunologic: Negative for hives. PHYSICAL EXAM:    BP (!) 122/58   Pulse 84   Ht 5' 1\" (1.549 m)   Wt 138 lb (62.6 kg)   BMI 26.07 kg/m²      Physical Exam    General: Alert and oriented in no acute distress, using a walker to ambulate  HEENT: Head is normocephalic, atraumatic, pupils are equal bilaterally, throat appears to be clear  Neck: No significant jugular venous distention no cervical bruits  Cardiovascular: S1 and S2 heard, regular rate and rhythm, no significant murmurs rubs or gallops. Respiratory: On oxygen by nasal cannula, coarse Velcro bilateral rales in the bases and midlung fields posteriorly. Abdomen: Soft, nontender, nondistended, bowel sounds present. Extremities: No cyanosis clubbing or edema  Peripheral pulses: Bilateral radial artery pulses are palpated. Bilateral pedal pulses are well felt. Neuro: No facial droop and no gross focal motor deficits  Lymphatic: No significant cervical lymphadenopathy noted. Musculoskeletal: No significant redness or swelling noted in all exposed joints. Skin: No significant rashes noted the of the exposed regions. Medical problems and test results were reviewed with the patient today.      Recent Results (from the past 672 hour(s))   Transthoracic echocardiogram (TTE) complete with contrast, bubble, strain, and 3D PRN    Collection Time: 07/29/22 11:55 AM   Result Value Ref Range    LV EDV A2C 49 mL    LV EDV A4C 44 mL    LV ESV A2C 18 mL    LV ESV A4C 20 mL    IVSd 0.9 0.6 - 0.9 cm    LVIDd 3.4 (A) 3.9 - 5.3 cm    LVIDs 2.2 cm    LVOT Peak Velocity 0.9 m/s    LVOT Peak Gradient 4 mmHg    LVPWd 0.8 0.6 - 0.9 cm    LV E' Lateral Velocity 6 cm/s    LV E' Septal Velocity 3 cm/s    LV Ejection Fraction A2C 63 %    LV Ejection Fraction A4C 55 %    EF BP 59 55 - 100 %    LA Minor Axis 5.2 cm    LA Major Nelson 5.4 cm    LA Area 2C 14.4 cm2    LA Area 4C 23.8 cm2    LA Volume BP 50 22 - 52 mL    LA Diameter 3.8 cm    AV Cusp Mmode 1.4 cm    AV Peak Velocity 1.5 m/s    AV Peak Gradient 9 mmHg    MV E Wave Deceleration Time 299.0 ms    MV A Velocity 0.82 m/s    MV E Velocity 0.64 m/s    RVIDd 2.9 cm    Body Surface Area 1.66 m2    Fractional Shortening 2D 35 28 - 44 %    LV ESV Index A4C 12 mL/m2    LV EDV Index A4C 27 mL/m2    LV ESV Index A2C 11 mL/m2    LV EDV Index A2C 30 mL/m2    LVIDd Index 2.09 cm/m2    LVIDs Index 1.35 cm/m2    LV RWT Ratio 0.47     LV Mass 2D 78.3 67 - 162 g    LV Mass 2D Index 48.0 43 - 95 g/m2    MV E/A 0.78     E/E' Ratio (Averaged) 16.00     E/E' Lateral 10.67     E/E' Septal 21.33     LA Volume Index BP 31 16 - 34 ml/m2    LA Size Index 2.33 cm/m2    AV Velocity Ratio 0.60      Lab Results   Component Value Date/Time    CHOL 148 09/09/2021 10:17 AM    HDL 71 09/09/2021 10:17 AM    VLDL 13 09/09/2021 10:17 AM   ,hemoglobin, basic metabolic panel,   Lab Results   Component Value Date/Time    TSH 20.900 09/09/2021 10:17 AM    ,  Lab Results   Component Value Date/Time     09/09/2021 10:17 AM    K 4.3 09/09/2021 10:17 AM     09/09/2021 10:17 AM    CO2 22 09/09/2021 10:17 AM    BUN 22 09/09/2021 10:17 AM    GFRAA 60 09/09/2021 10:17 AM    GLOB 5.8 09/30/2020 10:33 AM    ALT 24 09/09/2021 10:17 AM    AST 37 09/09/2021 10:17 AM      Lab Results   Component Value Date    LDLCALC 64 09/09/2021      Lab Results   Component Value Date    CREATININE 1.07 (H) 09/09/2021      No results found for this or any previous visit. ASSESSMENT and PLAN    Dyspnea on exertion  -Secondary to pulmonary fibrosis. Echo with preserved LV function, no significant valvular heart disease or structural heart disease. Normal diastolic function for age With no concerning findings suggestive of congestive heart failure. Essential hypertension  -Well-controlled with metoprolol succinate 25 mg daily. Chronic respiratory failure with hypoxia (HCC)  -On oxygen by nasal cannula-Per pulmonary-secondary to pulmonary fibrosis    Pulmonary fibrosis (HCC)  Uncertain etiology-Per pulmonary    Mixed hyperlipidemia   -Continue atorvastatin therapy    Overall Impression  -Reassured of the findings on her echocardiogram which showed preserved LV function and no significant valvular structural heart disease and normal diastolic function for age. We would only need to see her in follow-up on an as-needed basis. She has no formal diagnosis of heart failure based on these findings. She can take the Lasix on an as-needed basis but there is no strong benefit from taking it daily. Continue to follow-up with pulmonary/allergy for dyspnea/pulmonary fibrosis. Return if symptoms worsen or fail to improve, for matthew, leg swelling. Thank you Dr. Rodri Jimenes For allowing us to participate in the care of your patient. If you have any further questions, please do not hesitate to contact us.   Sincerely,        Umesh Blunt MD   8/1/2022

## 2022-08-03 ENCOUNTER — TELEMEDICINE (OUTPATIENT)
Dept: PRIMARY CARE CLINIC | Facility: CLINIC | Age: 72
End: 2022-08-03
Payer: OTHER GOVERNMENT

## 2022-08-03 DIAGNOSIS — R07.89 LEFT-SIDED CHEST WALL PAIN: Primary | ICD-10-CM

## 2022-08-03 DIAGNOSIS — Z99.81 O2 DEPENDENT: ICD-10-CM

## 2022-08-03 DIAGNOSIS — J96.11 CHRONIC RESPIRATORY FAILURE WITH HYPOXIA (HCC): ICD-10-CM

## 2022-08-03 DIAGNOSIS — R05.3 CHRONIC COUGH: ICD-10-CM

## 2022-08-03 DIAGNOSIS — J47.9 BRONCHIECTASIS WITHOUT COMPLICATION (HCC): ICD-10-CM

## 2022-08-03 PROCEDURE — 1123F ACP DISCUSS/DSCN MKR DOCD: CPT | Performed by: FAMILY MEDICINE

## 2022-08-03 PROCEDURE — 99214 OFFICE O/P EST MOD 30 MIN: CPT | Performed by: FAMILY MEDICINE

## 2022-08-03 RX ORDER — PREDNISONE 20 MG/1
20 TABLET ORAL DAILY
Qty: 7 TABLET | Refills: 0 | Status: SHIPPED | OUTPATIENT
Start: 2022-08-03 | End: 2022-08-10

## 2022-08-03 RX ORDER — NAPROXEN 375 MG/1
375 TABLET ORAL 2 TIMES DAILY WITH MEALS
Qty: 14 TABLET | Refills: 0 | Status: SHIPPED | OUTPATIENT
Start: 2022-08-03 | End: 2022-09-22 | Stop reason: ALTCHOICE

## 2022-08-03 ASSESSMENT — ENCOUNTER SYMPTOMS
GASTROINTESTINAL NEGATIVE: 1
COUGH: 1
ALLERGIC/IMMUNOLOGIC NEGATIVE: 1
EYES NEGATIVE: 1

## 2022-08-03 NOTE — PROGRESS NOTES
bp 130/78  Spo2 89-90  [address] [facility], [facility] [address]  Office : [phone]  Fax : [phone]    Pt was seen by synchronous (real-time) audio-video technology   I was at my home office while conducting this encounter  Pt was at home during the visit  Pts  healthcare decision maker is aware that this patient-initiated Telehealth encounter is a billable service, with coverage as determined by her insurance carrier. She is aware that she may receive a bill and has provided verbal consent to proceed:     pts son helps with with communication and history  Subjective: The patient is a 67 y.o. female  who presents for f/u on recent bronchitis  Pt has worsening cough and congestion  Pt was trested with abx/prednisone-pt feels lot better now  Pt says excess cough has caused muscle pull on left lateral chest  Pain is achy to sharp-worse with cough and movement  No fever/chills  No sob more than baseline  No rash on chest wall  Pt reluctant to do EKG/CXR today-says will go to ER if worse      multiple chronic medical conditions-good compliance with medications-no new concerns-pt here to get routeine labs and need refill on meds. no cardiopulmonary symptoms  Pneumonitis/lung fibrosis-Still on o2  Pt seeing lung specialsi for persistent inflammation  Chronic cough  Pt has appt with allergist soon  Hypertension--Pt BP been controlled without meds  diabetes -pts blood sugar stable OFF MED  Hyperlipidemia--pts on low carb diet and low fat diet -stable on med  Gerd -stable on diet /med  Thyroid problem- stable  Anemia-on and off  iron supplement  Diastolic dysfunction with pedal edema--table on lasix/potassium  PT feels  better    Pt seeing GI for scopes for anemia and elevated LFT  pts appetite if fair      Patient Active Problem List   Diagnosis    Gallstones    Prediabetes    Pulmonary infiltrate on chest x-ray    O2 dependent    Hypophosphatemia    Osteopenia    Asthma    H/O Hashimoto thyroiditis    Absolute anemia    Gastroenteritis    Frequent falls    Sensorineural hearing loss (SNHL) of both ears    Pneumonia of both lungs due to infectious organism    Low magnesium level    Anxiety    Debility    Allergic rhinitis    Presbycusis of right ear with unrestricted hearing of left ear    Acute respiratory failure (HCC)    Chronic kidney disease    Essential hypertension    Vertigo    Encounter for screening colonoscopy    Abnormal CXR (chest x-ray)    Hyponatremia    Metabolic acidosis    ERIC (acute kidney injury) ([address]    Unsteady gait    Dyslipidemia    ACP (advance care planning)    Psoriasis    Chronic respiratory failure with hypoxia (HCC)    SOBOE (shortness of breath on exertion)    Thrombocytopenia (HCC)    Heart failure with preserved ejection fraction (HCC)    H/O septic shock    Current mild episode of major depressive disorder without prior episode ([address]    Chronic cough    Bronchiectasis without complication ([address]    Encounter for examination following treatment at hospital    Low calcium levels    Diastolic dysfunction    Hypokalemia    Bacteremia due to Streptococcus    Elevated AST (SGOT)    Screening for breast cancer    Arthritis    Hypothyroidism    Type 2 diabetes mellitus without complication, without long-term current use of insulin (HCC)    Pulmonary fibrosis (HCC)    Hypersensitivity pneumonitis (HCC)    Bronchitis    Congestive heart failure (HCC)    Hyperlipidemia    Hypomagnesemia    Left-sided chest wall pain       Past Medical History:   Diagnosis Date    Abdominal pain onset 6 weeks ago    more with eating    Anemia     Arthritis     osteo    Asthma     prn inhaler    Bronchiectasis without complication (HCC)     oxygen 2 LPM continuous    Chronic kidney disease     stage 2-     Depression     Diastolic dysfunction     Dyslipidemia     Fatigue     Gallstones     being ruled out per son    H/O echocardiogram 12/2021    Raiza- 12/2021 Echo LVEF 65% Hashimoto's thyroiditis     Hearing reduced     bilat    Heart failure (HCC)     Heart failure with preserved ejection fraction-Echo LVEF 65%    Hypertension     controlled with med    Osteopenia     Prediabetes     no meds---A1C 6.5 4/2022 while hospitalized/ recent hx sepsis- previous A1C    Psoriasis     Pulmonary fibrosis (HCC)     PER NOTE IN CC FROM DR VIRGEN-- PT WAS TOLD THIS IN HER PAST-- HOME 2 AND PRN INHALER    Vertigo     hx-- no t a recent problem       Past Surgical History:   Procedure Laterality Date    ABDOMINOPLASTY      COLONOSCOPY N/A 11/19/2020    COLONOSCOPY/ BMI 22 performed by Mateusz Gonzalez MD at [facility] ENDOSCOPY    ERCP  4/7/2021         HYSTERECTOMY (CERVIX STATUS UNKNOWN)      Total    OTHER SURGICAL HISTORY      lung biopsy    SALPINGO-OOPHORECTOMY Bilateral     THYROIDECTOMY      total    UPPER GASTROINTESTINAL ENDOSCOPY  5/4/2022            Social History     Socioeconomic History    Marital status:      Spouse name: Not on file    Number of children: Not on file    Years of education: Not on file    Highest education level: Not on file   Occupational History    Not on file   Tobacco Use    Smoking status: Never    Smokeless tobacco: Never   Substance and Sexual Activity    Alcohol use: No    Drug use: No    Sexual activity: Not on file   Other Topics Concern    Not on file   Social History Narrative    Not on file     Social Determinants of Health     Financial Resource Strain: Not on file   Food Insecurity: Not on file   Transportation Needs: Not on file   Physical Activity: Not on file   Stress: Not on file   Social Connections: Not on file   Intimate Partner Violence: Not on file   Housing Stability: Not on file       Allergies   Allergen Reactions    Hydrochlorothiazide Other (See Comments)     hypokalemia    Lisinopril Other (See Comments)       Current Outpatient Medications   Medication Sig Dispense Refill    predniSONE (DELTASONE) 20 MG tablet Take 1 tablet by mouth in the morning for 7 days. 7 tablet 0    naproxen (NAPROSYN) 375 MG tablet Take 1 tablet by mouth in the morning and 1 tablet in the evening. Take with meals. As needed for pain. 14 tablet 0    metoprolol succinate (TOPROL XL) 50 MG extended release tablet Take 0.5 tablets by mouth in the morning. ( This is the correct script). 90 tablet 0    atorvastatin (LIPITOR) 20 MG tablet Take 1 tablet by mouth daily 90 tablet 0    magnesium oxide (MAG-OX) 400 (240 Mg) MG tablet Take 1 tablet by mouth daily 90 tablet 1    sertraline (ZOLOFT) 100 MG tablet Take 1 tablet by mouth daily 90 tablet 0    benzonatate (TESSALON) 100 MG capsule Take 1 capsule by mouth 2 times daily as needed for Cough 60 capsule 2    OXYGEN 2 each by Nasal route continuous 2L      B Complex Vitamins (VITAMIN B COMPLEX PO) Take by mouth      albuterol (PROVENTIL) (2.5 MG/3ML) 0.083% nebulizer solution Inhale 2.5 mg into the lungs every 4 hours as needed      albuterol sulfate  (90 Base) MCG/ACT inhaler Inhale 2 puffs into the lungs every 4 hours as needed      furosemide (LASIX) 40 MG tablet Take 20 mg by mouth daily      levothyroxine (SYNTHROID) 50 MCG tablet Take 50 mcg by mouth every morning (before breakfast)      potassium chloride (KLOR-CON M) 20 MEQ extended release tablet Take 20 mEq by mouth daily       No current facility-administered medications for this visit. Review of Systems   Constitutional: Negative. HENT:  Positive for congestion. Eyes: Negative. Respiratory:  Positive for cough. Cardiovascular:  Positive for chest pain. Gastrointestinal: Negative. Endocrine: Negative. Genitourinary: Negative. Musculoskeletal: Negative. Skin: Negative. Allergic/Immunologic: Negative. Neurological: Negative. Hematological: Negative. Psychiatric/Behavioral: Negative. Objective: There were no vitals taken for this visit. Physical Exam  Constitutional:       Appearance: Normal appearance. HENT:      Head: Normocephalic and atraumatic. Right Ear: External ear normal.      Left Ear: External ear normal.      Nose: Nose normal.      Mouth/Throat:      Mouth: Mucous membranes are moist.   Eyes:      Extraocular Movements: Extraocular movements intact. Conjunctiva/sclera: Conjunctivae normal.   Cardiovascular:      Comments: Left lateral chest wall tenderness upon exam by pt  No rash  Pulmonary:      Effort: Pulmonary effort is normal.   Neurological:      Mental Status: She is alert. Psychiatric:         Mood and Affect: Mood normal.         Behavior: Behavior normal.         Thought Content: Thought content normal.          ASSESSMENT/PLAN:    1. Left-sided chest wall pain  Comments:  lateral with cough    Overview:  lateral  chestwall pain-worse with cough  Likely muscle strain  est  Warmth locally  Motrin as needed for pain  Avoid heavy lifting/pushing/pulling  Monitor for any abdominal or urinary symptoms-seek attention then  F/u as needed    Orders:  -     naproxen (NAPROSYN) 375 MG tablet; Take 1 tablet by mouth in the morning and 1 tablet in the evening. Take with meals. As needed for pain., Disp-14 tablet, R-0Normal  -     XR CHEST (2 VW); Future  2. Chronic respiratory failure with hypoxia (HCC)  Overview:  o2      3. Bronchiectasis without complication ([address]  Overview:  wixwla did not help  Try only steroid inhaler      Orders:  -     predniSONE (DELTASONE) 20 MG tablet; Take 1 tablet by mouth in the morning for 7 days. , Disp-7 tablet, R-0Normal  -     XR CHEST (2 VW); Future  4. Chronic cough  Overview:  bronchectasis  Tessalon perles      Orders:  -     XR CHEST (2 VW); Future  5.  O2 dependent  Overview:  2l now           Orders Placed This Encounter   Procedures    XR CHEST (2 VW)     Standing Status:   Future     Standing Expiration Date:   8/3/2023        Orders Placed This Encounter   Medications    predniSONE (DELTASONE) 20 MG tablet     Sig: Take 1 tablet by mouth in the morning for 7 days. Dispense:  7 tablet     Refill:  0    naproxen (NAPROSYN) 375 MG tablet     Sig: Take 1 tablet by mouth in the morning and 1 tablet in the evening. Take with meals. As needed for pain. Dispense:  14 tablet     Refill:  0      CT Result (most recent):  CT CHEST WO CONTRAST 04/09/2021    Narrative  CT CHEST WITHOUT CONTRAST (HRCT) DATED 4/9/2021. History: Hypersensitivity pneumonitis. Comparison: CT chest without contrast 11/27/2020    Technique:   Multiple contiguous helical CT images reconstructed at 5 mm were  obtained from the neck base to the mid abdomen without the administration of  contrast. Additional thin section, axial high-resolution CT images were obtained  with the patient in the prone and supine position. All CT scans performed at  this facility use one or all of the following: Automated exposure control,  adjustment of the mA and/or kVp according to patient's size, iterative  reconstruction. Findings:  CT Chest:  The base of the neck is unremarkable in appearance. No evolving axillary, or  mediastinal lymphadenopathy is seen. Evaluation of the paris is limited due to  noncontrast technique although no obvious bulky hilar changes are seen. The  thoracic aorta is normal in caliber. Evaluation with lung windows demonstrates persistent parenchymal changes. Today's high resolution chest CT included prone and supine imaging without  inhalational and exhalational imaging as performed on the prior exam. Today's  images are more consistent with exhalational images obtained on the prior exam.  Areas of persistent air trapping are suggested most evident in the periphery of  the upper lobes and lung bases. Regional areas of groundglass attenuation and  interstitial septal thickening are seen which are not significantly changed.   Persistent bronchiectatic changes are seen most evident in the right middle lobe  where cystic bronchiectatic appearing changes which are not significantly  changed from the prior study. A stable right lower lobe suture line is seen to  just had prior wedge resection. No pleural effusion is seen. Lungs are expanded  without evidence for pneumothorax. No acute osseous abnormality is seen. Limited evaluation of the upper abdomen demonstrates no acute abnormality. Mild  pneumobilia is now seen without abnormal evolving bowel dilation likely related  to a recent ERCP. Nodular densities are seen in the samira hepatis suboptimally  assessed. These could either represent lymph nodes or sequela of cavernous  transformation of the portal vein. Impression  1. Parenchymal changes which are not significantly changed from a prior  comparison study whose imaging features are consistent with hypersensitivity  pneumonitis. No superimposed acute process is suggested. 2. Nodular densities in the samira hepatis which could either represent prominent  lymph nodes or cavernous transformation of the portal vein. New mild pneumobilia  is seen likely related to recent ERCP. This report was made using voice transcription. Despite my best efforts to avoid  any, transcription errors may persist. If there is any question about the  accuracy of the report or need for clarification, then please call [phone], or text me through [employer] for clarification or correction. No results found for any visits on 08/03/22.    Lab Results   Component Value Date     09/09/2021    K 4.3 09/09/2021     09/09/2021    CO2 22 09/09/2021    BUN 22 09/09/2021    CREATININE 1.07 (H) 09/09/2021    GLUCOSE 85 09/09/2021    CALCIUM 8.7 09/09/2021    PROT 7.9 09/09/2021    LABALBU 4.2 09/09/2021    BILITOT 0.6 09/09/2021    ALKPHOS 129 (H) 09/09/2021    AST 37 09/09/2021    ALT 24 09/09/2021    GFRAA 60 09/09/2021    AGRATIO 1.1 (L) 09/09/2021    GLOB 5.8 (H) 09/30/2020     Lab Results   Component Value Date    WBC 6.7 09/09/2021    HGB 13.9 09/09/2021    HCT 44.1 09/09/2021    MCV 84 09/09/2021     09/09/2021     Lab Results   Component Value Date    LABA1C 6.5 (H) 04/01/2021     Lab Results   Component Value Date     04/01/2021     Lab Results   Component Value Date    CHOL 148 09/09/2021    CHOL 228 09/30/2020    CHOL 148 07/02/2019     Lab Results   Component Value Date    TRIG 65 09/09/2021    TRIG 119 09/30/2020    TRIG 64 07/02/2019     Lab Results   Component Value Date    HDL 71 09/09/2021    HDL 74 (H) 09/30/2020    HDL 81 07/02/2019     Lab Results   Component Value Date    LDLCALC 64 09/09/2021    LDLCALC 130.2 (H) 09/30/2020    LDLCALC 54 07/02/2019     Lab Results   Component Value Date    LABVLDL 23.8 (H) 09/30/2020    LABVLDL 13 07/02/2019    VLDL 13 09/09/2021     Lab Results   Component Value Date    CHOLHDLRATIO 3.1 09/30/2020       Due to this being a TeleHealth evaluation, many elements of the physical examination are unable to be assessed. We discussed the expected course, resolution and complications of the diagnosis(es) in detail. Medication risks, benefits, costs, interactions, and alternatives were discussed as indicated. I advised her to contact the office if her condition worsens, changes or fails to improve as anticipated. She expressed understanding with the diagnosis(es) and plan. Pursuant to the emergency declaration under the [address] [place] [address], [facility] waiver authority and the [employer] Act, this Virtual  Visit was conducted, with patient's consent, to reduce the patient's risk of exposure to COVID-19 and provide continuity of care for an established patient. Services were provided through a video synchronous discussion virtually to substitute for in-person clinic visit. We discussed the expected course, resolution and complications of the diagnosis(es) in detail.   Medication risks, benefits, costs, interactions, and alternatives were discussed as indicated. I advised her to contact the office if her condition worsens, changes or fails to improve as anticipated. She expressed understanding with the diagnosis(es) and plan. Return in about 2 weeks (around 8/17/2022).      Courtney Miller MD

## 2022-08-29 ENCOUNTER — TELEPHONE (OUTPATIENT)
Dept: PRIMARY CARE CLINIC | Facility: CLINIC | Age: 72
End: 2022-08-29

## 2022-08-31 ENCOUNTER — TELEPHONE (OUTPATIENT)
Dept: PULMONOLOGY | Age: 72
End: 2022-08-31

## 2022-08-31 NOTE — TELEPHONE ENCOUNTER
Sorin from [address] [place] [address] called about Patient wanting a portable concentrator . Told him patient has not contacted us that I see in chart . Patient has not been seen since 4/5/22 . He says he has sent two prescriptions over in fax . I told him that patient would need appt with Provider .

## 2022-09-22 ENCOUNTER — OFFICE VISIT (OUTPATIENT)
Dept: PRIMARY CARE CLINIC | Facility: CLINIC | Age: 72
End: 2022-09-22
Payer: COMMERCIAL

## 2022-09-22 VITALS
BODY MASS INDEX: 26.43 KG/M2 | DIASTOLIC BLOOD PRESSURE: 82 MMHG | HEIGHT: 61 IN | SYSTOLIC BLOOD PRESSURE: 124 MMHG | TEMPERATURE: 97.7 F | HEART RATE: 62 BPM | WEIGHT: 140 LBS | OXYGEN SATURATION: 95 %

## 2022-09-22 DIAGNOSIS — R05.3 CHRONIC COUGH: ICD-10-CM

## 2022-09-22 DIAGNOSIS — F32.0 CURRENT MILD EPISODE OF MAJOR DEPRESSIVE DISORDER WITHOUT PRIOR EPISODE (HCC): ICD-10-CM

## 2022-09-22 DIAGNOSIS — E78.5 HYPERLIPIDEMIA, UNSPECIFIED HYPERLIPIDEMIA TYPE: ICD-10-CM

## 2022-09-22 DIAGNOSIS — E83.42 HYPOMAGNESEMIA: ICD-10-CM

## 2022-09-22 DIAGNOSIS — I50.32 CHRONIC HEART FAILURE WITH PRESERVED EJECTION FRACTION (HCC): ICD-10-CM

## 2022-09-22 DIAGNOSIS — J84.10 PULMONARY FIBROSIS (HCC): ICD-10-CM

## 2022-09-22 DIAGNOSIS — E03.9 HYPOTHYROIDISM, UNSPECIFIED TYPE: ICD-10-CM

## 2022-09-22 DIAGNOSIS — J96.11 CHRONIC RESPIRATORY FAILURE WITH HYPOXIA (HCC): ICD-10-CM

## 2022-09-22 DIAGNOSIS — J47.9 BRONCHIECTASIS WITHOUT COMPLICATION (HCC): ICD-10-CM

## 2022-09-22 DIAGNOSIS — R73.03 PREDIABETES: ICD-10-CM

## 2022-09-22 DIAGNOSIS — J40 BRONCHITIS: ICD-10-CM

## 2022-09-22 DIAGNOSIS — I10 ESSENTIAL HYPERTENSION: Primary | ICD-10-CM

## 2022-09-22 DIAGNOSIS — Z23 IMMUNIZATION DUE: ICD-10-CM

## 2022-09-22 DIAGNOSIS — Z99.81 DEPENDENCE ON SUPPLEMENTAL OXYGEN: ICD-10-CM

## 2022-09-22 DIAGNOSIS — J67.9 HYPERSENSITIVITY PNEUMONITIS (HCC): ICD-10-CM

## 2022-09-22 DIAGNOSIS — R60.0 PEDAL EDEMA: ICD-10-CM

## 2022-09-22 PROCEDURE — 99214 OFFICE O/P EST MOD 30 MIN: CPT | Performed by: FAMILY MEDICINE

## 2022-09-22 PROCEDURE — 90471 IMMUNIZATION ADMIN: CPT | Performed by: FAMILY MEDICINE

## 2022-09-22 PROCEDURE — 1123F ACP DISCUSS/DSCN MKR DOCD: CPT | Performed by: FAMILY MEDICINE

## 2022-09-22 PROCEDURE — 90694 VACC AIIV4 NO PRSRV 0.5ML IM: CPT | Performed by: FAMILY MEDICINE

## 2022-09-22 RX ORDER — LANOLIN ALCOHOL/MO/W.PET/CERES
400 CREAM (GRAM) TOPICAL DAILY
Qty: 90 TABLET | Refills: 1 | Status: SHIPPED | OUTPATIENT
Start: 2022-09-22

## 2022-09-22 RX ORDER — METOPROLOL SUCCINATE 50 MG/1
25 TABLET, EXTENDED RELEASE ORAL DAILY
Qty: 45 TABLET | Refills: 1 | Status: SHIPPED | OUTPATIENT
Start: 2022-09-22

## 2022-09-22 RX ORDER — BENZONATATE 100 MG/1
100 CAPSULE ORAL 2 TIMES DAILY PRN
Qty: 60 CAPSULE | Refills: 2 | Status: SHIPPED | OUTPATIENT
Start: 2022-09-22

## 2022-09-22 RX ORDER — SERTRALINE HYDROCHLORIDE 100 MG/1
100 TABLET, FILM COATED ORAL DAILY
Qty: 90 TABLET | Refills: 1 | Status: SHIPPED | OUTPATIENT
Start: 2022-09-22

## 2022-09-22 RX ORDER — IPRATROPIUM BROMIDE 21 UG/1
SPRAY, METERED NASAL DAILY PRN
COMMUNITY
Start: 2022-08-09

## 2022-09-22 RX ORDER — FUROSEMIDE 40 MG/1
20 TABLET ORAL DAILY PRN
Qty: 90 TABLET | Refills: 0 | Status: SHIPPED | OUTPATIENT
Start: 2022-09-22

## 2022-09-22 RX ORDER — LEVOTHYROXINE SODIUM 0.05 MG/1
50 TABLET ORAL
Qty: 90 TABLET | Refills: 1 | Status: SHIPPED | OUTPATIENT
Start: 2022-09-22

## 2022-09-22 RX ORDER — POTASSIUM CHLORIDE 20 MEQ/1
20 TABLET, EXTENDED RELEASE ORAL DAILY PRN
Qty: 90 TABLET | Refills: 0 | Status: SHIPPED | OUTPATIENT
Start: 2022-09-22

## 2022-09-22 RX ORDER — GUAIFENESIN 600 MG/1
1200 TABLET, EXTENDED RELEASE ORAL 2 TIMES DAILY
COMMUNITY

## 2022-09-22 RX ORDER — ATORVASTATIN CALCIUM 20 MG/1
20 TABLET, FILM COATED ORAL DAILY
Qty: 90 TABLET | Refills: 1 | Status: SHIPPED | OUTPATIENT
Start: 2022-09-22 | End: 2022-12-22

## 2022-09-22 NOTE — PROGRESS NOTES
[address] [facility], [facility] [address]  Office : [phone]  Fax : [phone]      Subjective: The patient is a 67 y.o. female  who presents for f/u on multiple chronic medical conditions-good compliance with medications-no new concerns-pt here to get routeine labs and need refill on meds. no cardiopulmonary symptoms  Hypertension--Pt BP been controlled without meds  diabetes -pts blood sugar stable OFF MED  Hyperlipidemia--pts on low carb diet and low fat diet -stable on med  Gerd -stable on diet /med  Thyroid problem- stable  Anemia-on iron supplement  Diastolic dysfunction with pedal edema--table on lasix/potassium  Pneumonitis=chronic cough   on o2  Pt seeing lung specialsi for persistent pneumonitis  Pt seeing GI for scopes for anemia and elevated LFT  pts appetite if fair  Chronic rhinosinusitis-seen ENT --on nasal spray          Patient Active Problem List   Diagnosis Code    Gallstones K80.20    Prediabetes R73.03    Hypothyroidism E03.9    Essential hypertension I10    Dyslipidemia E78.5    Asthma J45.909    Psoriasis L40.9    Arthritis M19.90    Osteopenia M85.80    Hypokalemia E87.6    Vertigo R42    Anxiety F41.9    Presbycusis of right ear with unrestricted hearing of left ear H91.11    Allergic rhinitis J30.9    Physical exam Z00.00    Encounter for immunization Z23    ACP (advance care planning) Z71.89    Screening for breast cancer Z12.39    Chronic kidney disease N18.9    Environmental allergies Z91.09    Sensorineural hearing loss (SNHL) of both ears H90.3    Encounter for examination following treatment at hospital Z09    Pneumonia of both lungs due to infectious organism J18.9    Low magnesium level E67.8    Diastolic dysfunction M68.36    Current mild episode of major depressive disorder without prior episode (HCC) F32.0    Absolute anemia D64.9    Low calcium levels E83.51    Debility R53.81    Elevated AST (SGOT) R74.01    O2 dependent Z99.81 Encounter for screening colonoscopy Z12.11    Acute respiratory failure (HCC) J96.00    Abnormal CXR (chest x-ray) R93.89    Pneumonitis J18.9    Bronchiectasis without complication (HCC) I18.4    Chronic respiratory failure with hypoxia (HCC) J96.11    ERIC (acute kidney injury) ([facility] [address] N17.9    Bacteremia due to Streptococcus R78.81, B95.5    Gastroenteritis K52.9    H/O Hashimoto thyroiditis Z86.39    H/O septic shock Z86.19    Heart failure with preserved ejection fraction (HCC) I50.30    Hyponatremia E87.1    Hypophosphatemia S68.99    Metabolic acidosis E41.9    Pulmonary infiltrate on chest x-ray R91.8    Septic shock (HCC) A41.9, R65.21    Thrombocytopenia (HCC) D69.6    Type 2 diabetes mellitus without complication, without long-term current use of insulin (HCC) E11.9    Frequent falls R29.6    Unsteady gait R26.81    Forgetfulness R68.89    SOBOE (shortness of breath on exertion) R06.02    Chronic cough R05.3       Past Medical History:   Diagnosis Date    Abdominal pain onset 6 weeks ago    more with eating    Anemia     Arthritis     osteo    Asthma     prn inhaler    Bronchiectasis (HCC)     Dyslipidemia     Fatigue     Gallstones     being ruled out per son    Hearing reduced     bilat    Hypertension     controlled with med    Hypothyroidism     Osteopenia     Prediabetes     no meds--- checks sqbs \" occ'-- per son usually goea with what the md says about her #s    Psoriasis     Psychiatric disorder     DEPRESSION    Pulmonary fibrosis (HCC)     PER NOTE IN CC FROM DR VIRGEN-- PT WAS TOLD THIS IN HER PAST-- HOME 2 AND PRN INHALER    Vertigo     hx-- no t a recent problem       Past Surgical History:   Procedure Laterality Date    COLONOSCOPY N/A 11/19/2020    COLONOSCOPY/ BMI 22 performed by Thao Cardoso MD at [facility] ENDOSCOPY    ERCP  4/7/2021         HX ABDOMINOPLASTY      HX HYSTERECTOMY      Total    HX OTHER SURGICAL      lung biopsy    HX SALPINGO-OOPHORECTOMY Bilateral     HX THYROIDECTOMY total       Social History     Socioeconomic History    Marital status:      Spouse name: Not on file    Number of children: Not on file    Years of education: Not on file    Highest education level: Not on file   Occupational History    Not on file   Tobacco Use    Smoking status: Never Smoker    Smokeless tobacco: Never Used   Vaping Use    Vaping Use: Never used   Substance and Sexual Activity    Alcohol use: No    Drug use: No    Sexual activity: Not on file   Other Topics Concern    Not on file   Social History Narrative    Not on file     Social Determinants of Health     Financial Resource Strain: Low Risk     Difficulty of Paying Living Expenses: Not hard at all   Food Insecurity: No Food Insecurity    Worried About [address] in the Last Year: Never true    [address] in the Last Year: Never true   Transportation Needs: No Transportation Needs    Lack of Transportation (Medical): No    Lack of Transportation (Non-Medical):  No   Physical Activity:     Days of Exercise per Week: Not on file    Minutes of Exercise per Session: Not on file   Stress:     Feeling of Stress : Not on file   Social Connections:     Frequency of Communication with Friends and Family: Not on file    Frequency of Social Gatherings with Friends and Family: Not on file    Attends [employer] Services: Not on file    Active Member of Clubs or Organizations: Not on file    Attends Club or Organization Meetings: Not on file    Marital Status: Not on file   Intimate Partner Violence:     Fear of Current or Ex-Partner: Not on file    Emotionally Abused: Not on file    Physically Abused: Not on file    Sexually Abused: Not on file   Housing Stability:     Unable to Pay for Housing in the Last Year: Not on file    Number of [place] in the Last Year: Not on file    Unstable Housing in the Last Year: Not on file       Allergies   Allergen Reactions    Hydrochlorothiazide Other (comments)     hypokalemia    Lisinopril Cough Current Outpatient Medications   Medication Sig Dispense Refill    fluticasone propion-salmeteroL (Wixela Inhub) 250-50 mcg/dose diskus inhaler Take 1 Puff by inhalation two (2) times a day. atorvastatin (LIPITOR) 20 mg tablet Take 1 Tablet by mouth daily for 30 days. Indications: excessive fat in the blood 30 Tablet 0    levothyroxine (SYNTHROID) 50 mcg tablet Take 1 Tablet by mouth Daily (before breakfast). Dose decreased to 50 mcg on 1/5/22 based on TSH 90 Tablet 0    magnesium oxide (MAG-OX) 400 mg tablet Take 1 Tablet by mouth daily. 90 Tablet 0    metoprolol succinate (TOPROL-XL) 50 mg XL tablet Take 1 Tablet by mouth daily. Dose increased to 50 mg on 6/1/21 90 Tablet 0    potassium chloride SR (K-TAB) 20 mEq tablet Take 1 Tablet by mouth daily. 90 Tablet 0    benzonatate (Tessalon Perles) 100 mg capsule Take 1 Capsule by mouth two (2) times daily as needed for Cough. 30 Capsule 0    furosemide (LASIX) 40 mg tablet Take 0.5 Tablets by mouth daily. 90 Tablet 0    VITAMIN B COMPLEX PO Take  by mouth. With Vitamin C      guaifenesin (MUCINEX PO) Take 600 mg by mouth. sertraline (ZOLOFT) 100 mg tablet Take 1 Tablet by mouth daily. 90 Tablet 0    albuterol (PROVENTIL VENTOLIN) 2.5 mg /3 mL (0.083 %) nebu 3 mL by Nebulization route every four (4) hours as needed for Wheezing. 150 Nebule 2    albuterol (Ventolin HFA) 90 mcg/actuation inhaler Take 2 Puffs by inhalation every four (4) hours as needed for Wheezing. 1 Inhaler 2    Oxygen 2 Each by Nasal route continuous. 2 lpm cont            Review of Systems   Constitutional: Positive for malaise/fatigue. HENT: Negative. Eyes: Negative. Respiratory: Positive for cough and sputum production. Cardiovascular: Negative. Gastrointestinal: Negative. Genitourinary: Negative. Musculoskeletal: Negative. Skin: Negative. Neurological: Negative. Endo/Heme/Allergies: Negative. Psychiatric/Behavioral: Positive for depression.  Negative for hallucinations, substance abuse and suicidal ideas. The patient is nervous/anxious. Objective:    Visit Vitals  BP (!) 144/78 (BP 1 Location: Right arm, BP Patient Position: Sitting, BP Cuff Size: Adult)   Pulse 79   Temp 97.5 °F (36.4 °C) (Temporal)   Resp 15   Ht 5' 1\" (1.549 m)   Wt 141 lb (64 kg)   SpO2 98%   BMI 26.64 kg/m²       Physical Exam   Constitutional: She is oriented to person, place, and time. She appears well-developed. HENT:   Head: Normocephalic and atraumatic. Right Ear: External ear normal.   Left Ear: External ear normal.   Nose: Nose normal.   Mouth/Throat: Oropharynx is clear and moist.   Eyes: Pupils are equal, round, and reactive to light. Conjunctivae and EOM are normal.   Neck: Normal range of motion. Neck supple. Cardiovascular: Normal rate, regular rhythm, normal heart sounds and intact distal pulses. Pulmonary/Chest: Effort normal and breath sounds normal.   Abdominal: Soft. Bowel sounds are normal.   Musculoskeletal: Normal range of motion. Neurological: She is alert and oriented to person, place, and time. She has normal reflexes. Skin: Skin is warm and dry. Psychiatric: Her behavior is normal. Judgment and thought content normal.   Depressed at times         . RESULTRCNTNC(15W    ASSESSMENT/PLAN:       1. Essential hypertension  Overview:  Stable on amlodipine and metoprolol  Stable with med  Refilled  Labs   Annual eye exam recommended  F/u in 3 months  2/2021  Restarted metoprolol 25 mg half pill daily  Call in 2 weeks with BP log  4/2021  mettoprolol 25 mg 1.5 pills daily ameya as BP up from steroid use  2022  Metoprolol 50 mg -half pill    Orders:  -     metoprolol succinate (TOPROL XL) 50 MG extended release tablet; Take 0.5 tablets by mouth daily ( This is the correct script), Disp-45 tablet, R-1Normal  -     Comprehensive Metabolic Panel; Future  -     Lipid Panel; Future  -     Hemoglobin A1C; Future  -     TSH; Future  -     T4, Free; Future  2. for Cough, Disp-60 capsule, R-2Normal  -     Comprehensive Metabolic Panel; Future  -     Lipid Panel; Future  -     Hemoglobin A1C; Future  -     TSH; Future  -     T4, Free; Future  -     External Referral to Pulmonology  8. Chronic cough  Overview:  bronchectasis  Tessalon perles      Orders:  -     benzonatate (TESSALON) 100 MG capsule; Take 1 capsule by mouth 2 times daily as needed for Cough, Disp-60 capsule, R-2Normal  -     Comprehensive Metabolic Panel; Future  -     Lipid Panel; Future  -     Hemoglobin A1C; Future  -     TSH; Future  -     T4, Free; Future  -     External Referral to Pulmonology  9. Chronic respiratory failure with hypoxia (HCC)  Overview:  o2      Orders:  -     benzonatate (TESSALON) 100 MG capsule; Take 1 capsule by mouth 2 times daily as needed for Cough, Disp-60 capsule, R-2Normal  -     Comprehensive Metabolic Panel; Future  -     Lipid Panel; Future  -     Hemoglobin A1C; Future  -     TSH; Future  -     T4, Free; Future  -     External Referral to Pulmonology  10. Hypomagnesemia  Comments:  on supplement  Overview:  on supplement    Orders:  -     magnesium oxide (MAG-OX) 400 (240 Mg) MG tablet; Take 1 tablet by mouth daily, Disp-90 tablet, R-1Normal  -     Comprehensive Metabolic Panel; Future  -     Lipid Panel; Future  -     Hemoglobin A1C; Future  -     TSH; Future  -     T4, Free; Future  -     Magnesium; Future  11. Current mild episode of major depressive disorder without prior episode (HCC)  Overview:  Stable on zoloft      Orders:  -     sertraline (ZOLOFT) 100 MG tablet; Take 1 tablet by mouth daily, Disp-90 tablet, R-1Normal  -     Comprehensive Metabolic Panel; Future  -     Lipid Panel; Future  -     Hemoglobin A1C; Future  -     TSH; Future  -     T4, Free; Future  12.  Hypothyroidism, unspecified type  Overview:  Stable on levothyroxine  9/2021  TSH is high  Will increase levo to 75 mcg  1/2022  TSH low--levo decreased to 50 mcg      Orders:  -     levothyroxine (SYNTHROID) 50 MCG tablet; Take 1 tablet by mouth every morning (before breakfast), Disp-90 tablet, R-1Normal  13. Prediabetes  Overview:  Off metformin 9/2020 since blood sugars are low      Orders:  -     Hemoglobin A1C; Future  14. Chronic heart failure with preserved ejection fraction (HCC)  -     potassium chloride (KLOR-CON M) 20 MEQ extended release tablet; Take 1 tablet by mouth daily as needed (with lasix for leg swelling), Disp-90 tablet, R-0Normal  -     furosemide (LASIX) 40 MG tablet; Take 0.5 tablets by mouth daily as needed (with potassium for leg swelling), Disp-90 tablet, R-0Normal  15. Dependence on supplemental oxygen  16. Pedal edema  -     potassium chloride (KLOR-CON M) 20 MEQ extended release tablet; Take 1 tablet by mouth daily as needed (with lasix for leg swelling), Disp-90 tablet, R-0Normal  -     furosemide (LASIX) 40 MG tablet;  Take 0.5 tablets by mouth daily as needed (with potassium for leg swelling), Disp-90 tablet, R-0Normal     Orders Placed This Encounter   Medications    atorvastatin (LIPITOR) 20 MG tablet     Sig: Take 1 tablet by mouth daily     Dispense:  90 tablet     Refill:  1    benzonatate (TESSALON) 100 MG capsule     Sig: Take 1 capsule by mouth 2 times daily as needed for Cough     Dispense:  60 capsule     Refill:  2    magnesium oxide (MAG-OX) 400 (240 Mg) MG tablet     Sig: Take 1 tablet by mouth daily     Dispense:  90 tablet     Refill:  1    metoprolol succinate (TOPROL XL) 50 MG extended release tablet     Sig: Take 0.5 tablets by mouth daily ( This is the correct script)     Dispense:  45 tablet     Refill:  1    sertraline (ZOLOFT) 100 MG tablet     Sig: Take 1 tablet by mouth daily     Dispense:  90 tablet     Refill:  1    levothyroxine (SYNTHROID) 50 MCG tablet     Sig: Take 1 tablet by mouth every morning (before breakfast)     Dispense:  90 tablet     Refill:  1    potassium chloride (KLOR-CON M) 20 MEQ extended release tablet     Sig: Take 1 tablet by Estab. %    ABS. IMM. GRANS. 0.0 0.0 - 0.1 A69J2/CS   METABOLIC PANEL, COMPREHENSIVE   Result Value Ref Range    Glucose 85 65 - 99 mg/dL    BUN 22 8 - 27 mg/dL    Creatinine 1.07 (H) 0.57 - 1.00 mg/dL    GFR est non-AA 52 (L) >59 mL/min/1.73    GFR est AA 60 >59 mL/min/1.73    BUN/Creatinine ratio 21 12 - 28    Sodium 137 134 - 144 mmol/L    Potassium 4.3 3.5 - 5.2 mmol/L    Chloride 105 96 - 106 mmol/L    CO2 22 20 - 29 mmol/L    Calcium 8.7 8.7 - 10.3 mg/dL    Protein, total 7.9 6.0 - 8.5 g/dL    Albumin 4.2 3.7 - 4.7 g/dL    GLOBULIN, TOTAL 3.7 1.5 - 4.5 g/dL    A-G Ratio 1.1 (L) 1.2 - 2.2    Bilirubin, total 0.6 0.0 - 1.2 mg/dL    Alk. phosphatase 129 (H) 48 - 121 IU/L    AST (SGOT) 37 0 - 40 IU/L    ALT (SGPT) 24 0 - 32 IU/L   LIPID PANEL   Result Value Ref Range    Cholesterol, total 148 100 - 199 mg/dL    Triglyceride 65 0 - 149 mg/dL    HDL Cholesterol 71 >39 mg/dL    VLDL, calculated 13 5 - 40 mg/dL    LDL, calculated 64 0 - 99 mg/dL   T4, FREE   Result Value Ref Range    T4, Free 1.21 0.82 - 1.77 ng/dL   TSH 3RD GENERATION   Result Value Ref Range    TSH 20.900 (H) 0.450 - 4.500 uIU/mL   IRON   Result Value Ref Range    Iron 75 27 - 139 ug/dL   MAGNESIUM   Result Value Ref Range    Magnesium 1.9 1.6 - 2.3 mg/dL     We discussed the expected course, resolution and complications of the diagnosis(es) in detail. Medication risks, benefits, costs, interactions, and alternatives were discussed as indicated. I advised her to contact the office if her condition worsens, changes or fails to improve as anticipated. She expressed understanding with the diagnosis(es) and plan. f/u For recheck on CBC/LABS/TSH/ [id number] on results    Yazmin Rose MD

## 2022-09-23 ENCOUNTER — TELEPHONE (OUTPATIENT)
Dept: PRIMARY CARE CLINIC | Facility: CLINIC | Age: 72
End: 2022-09-23

## 2022-09-23 DIAGNOSIS — J96.11 CHRONIC RESPIRATORY FAILURE WITH HYPOXIA (HCC): Primary | ICD-10-CM

## 2022-09-23 DIAGNOSIS — J67.9 HYPERSENSITIVITY PNEUMONITIS (HCC): ICD-10-CM

## 2022-09-23 DIAGNOSIS — J84.10 PULMONARY FIBROSIS (HCC): ICD-10-CM

## 2022-09-23 DIAGNOSIS — J47.9 BRONCHIECTASIS WITHOUT COMPLICATION (HCC): ICD-10-CM

## 2022-09-23 RX ORDER — ALBUTEROL SULFATE 2.5 MG/3ML
2.5 SOLUTION RESPIRATORY (INHALATION) EVERY 4 HOURS PRN
Qty: 120 EACH | Refills: 5 | Status: SHIPPED | OUTPATIENT
Start: 2022-09-23 | End: 2022-12-22

## 2022-10-26 ENCOUNTER — TELEPHONE (OUTPATIENT)
Dept: PRIMARY CARE CLINIC | Facility: CLINIC | Age: 72
End: 2022-10-26

## 2022-11-02 ENCOUNTER — TELEPHONE (OUTPATIENT)
Dept: PRIMARY CARE CLINIC | Facility: CLINIC | Age: 72
End: 2022-11-02

## 2022-11-02 NOTE — TELEPHONE ENCOUNTER
PT requesting medication refill on furosemide 4 mg, next apt 01-, please send to pharmacy, thank you.

## 2022-11-04 NOTE — TELEPHONE ENCOUNTER
Prescription was sent to the pharmacy on 9/22/22. With a 90 day supply. Pt has refills. Pt's son was notified.

## 2022-11-28 ENCOUNTER — TELEPHONE (OUTPATIENT)
Dept: PRIMARY CARE CLINIC | Facility: CLINIC | Age: 72
End: 2022-11-28

## 2022-11-29 NOTE — TELEPHONE ENCOUNTER
Spoke with patient's . Pt was seeing Dr Yasmany Hollis. And he asked patient to see him in one year. Patient is not happy with the current service that she is receiving from the pulmonologist.   Pt's quality of life is declining.    Patient would like to be referred to a new Pulmonologist.

## 2022-12-02 ENCOUNTER — TELEPHONE (OUTPATIENT)
Dept: PRIMARY CARE CLINIC | Facility: CLINIC | Age: 72
End: 2022-12-02

## 2022-12-05 NOTE — TELEPHONE ENCOUNTER
I spoke with Mr. Elizabeth Cherry,   The order from [address] [place] [address]  needs to be fill by the Pulmonologist.  Per.  Dr. Fani Pitts.

## 2022-12-12 ENCOUNTER — TELEPHONE (OUTPATIENT)
Dept: PRIMARY CARE CLINIC | Facility: CLINIC | Age: 72
End: 2022-12-12

## 2022-12-12 NOTE — TELEPHONE ENCOUNTER
Patient spouse Marlene Bland called ,because they want another referral for a different Pulmonology for second opinion,can you please contact him.       Thank you

## 2022-12-14 NOTE — TELEPHONE ENCOUNTER
Spoke with patient's . Patient received a call and letter from the same pulmonologist.  Patient is requesting a new pulmonologist.  Message was sent to the referral coordinator.

## 2023-01-01 ENCOUNTER — HOME CARE VISIT (OUTPATIENT)
Dept: HOSPICE | Facility: HOSPICE | Age: 73
End: 2023-01-01
Payer: OTHER GOVERNMENT

## 2023-01-01 ENCOUNTER — HOME CARE VISIT (OUTPATIENT)
Dept: SCHEDULING | Facility: HOME HEALTH | Age: 73
End: 2023-01-01
Payer: OTHER GOVERNMENT

## 2023-01-01 VITALS
DIASTOLIC BLOOD PRESSURE: 67 MMHG | RESPIRATION RATE: 25 BRPM | HEART RATE: 74 BPM | TEMPERATURE: 96.7 F | SYSTOLIC BLOOD PRESSURE: 126 MMHG

## 2023-01-01 VITALS
HEART RATE: 72 BPM | DIASTOLIC BLOOD PRESSURE: 54 MMHG | TEMPERATURE: 97.2 F | RESPIRATION RATE: 24 BRPM | SYSTOLIC BLOOD PRESSURE: 119 MMHG

## 2023-01-01 PROCEDURE — G0156 HHCP-SVS OF AIDE,EA 15 MIN: HCPCS

## 2023-01-01 PROCEDURE — G0299 HHS/HOSPICE OF RN EA 15 MIN: HCPCS

## 2023-01-01 ASSESSMENT — ENCOUNTER SYMPTOMS
CHEST TIGHTNESS: 1
COUGH CHARACTERISTICS: NON-PRODUCTIVE
CHEST TIGHTNESS: 1
COUGH: 1
DYSPNEA ACTIVITY LEVEL: AT REST
COUGH: 1
HEMOPTYSIS: 0
STOOL DESCRIPTION: FORMED
RESPIRATORY PAIN: 1
STOOL DESCRIPTION: FORMED
DYSPNEA ACTIVITY LEVEL: AT REST
RESPIRATORY PAIN: 1
COUGH CHARACTERISTICS: NON-PRODUCTIVE

## 2023-01-04 ENCOUNTER — TELEPHONE (OUTPATIENT)
Dept: PRIMARY CARE CLINIC | Facility: CLINIC | Age: 73
End: 2023-01-04

## 2023-01-04 DIAGNOSIS — J84.10 PULMONARY FIBROSIS (HCC): ICD-10-CM

## 2023-01-04 DIAGNOSIS — R05.3 CHRONIC COUGH: ICD-10-CM

## 2023-01-04 DIAGNOSIS — J67.9 HYPERSENSITIVITY PNEUMONITIS (HCC): ICD-10-CM

## 2023-01-04 DIAGNOSIS — J40 BRONCHITIS: ICD-10-CM

## 2023-01-04 DIAGNOSIS — J47.9 BRONCHIECTASIS WITHOUT COMPLICATION (HCC): ICD-10-CM

## 2023-01-04 DIAGNOSIS — J96.11 CHRONIC RESPIRATORY FAILURE WITH HYPOXIA (HCC): ICD-10-CM

## 2023-01-04 RX ORDER — BENZONATATE 100 MG/1
100 CAPSULE ORAL 2 TIMES DAILY PRN
Qty: 60 CAPSULE | Refills: 2 | Status: SHIPPED | OUTPATIENT
Start: 2023-01-04

## 2023-01-04 NOTE — TELEPHONE ENCOUNTER
Refill request on:  Benzonatate 100 mg - Take 1 capsule by mouth 2 times daily as needed for Cough    Spoke to pharmacy - no more refills on file. [facility]  Thank you!

## 2023-01-24 ENCOUNTER — TELEPHONE (OUTPATIENT)
Dept: PRIMARY CARE CLINIC | Facility: CLINIC | Age: 73
End: 2023-01-24

## 2023-01-24 DIAGNOSIS — I10 ESSENTIAL HYPERTENSION: ICD-10-CM

## 2023-01-24 RX ORDER — METOPROLOL SUCCINATE 50 MG/1
25 TABLET, EXTENDED RELEASE ORAL DAILY
Qty: 45 TABLET | Refills: 0 | Status: SHIPPED | OUTPATIENT
Start: 2023-01-24

## 2023-01-30 ENCOUNTER — OFFICE VISIT (OUTPATIENT)
Dept: PRIMARY CARE CLINIC | Facility: CLINIC | Age: 73
End: 2023-01-30
Payer: OTHER GOVERNMENT

## 2023-01-30 VITALS
TEMPERATURE: 97.4 F | BODY MASS INDEX: 27.19 KG/M2 | DIASTOLIC BLOOD PRESSURE: 64 MMHG | OXYGEN SATURATION: 89 % | HEART RATE: 72 BPM | SYSTOLIC BLOOD PRESSURE: 115 MMHG | WEIGHT: 144 LBS | HEIGHT: 61 IN

## 2023-01-30 DIAGNOSIS — R60.0 PEDAL EDEMA: ICD-10-CM

## 2023-01-30 DIAGNOSIS — R05.3 CHRONIC COUGH: ICD-10-CM

## 2023-01-30 DIAGNOSIS — J96.11 CHRONIC RESPIRATORY FAILURE WITH HYPOXIA (HCC): Primary | ICD-10-CM

## 2023-01-30 DIAGNOSIS — E78.5 HYPERLIPIDEMIA, UNSPECIFIED HYPERLIPIDEMIA TYPE: ICD-10-CM

## 2023-01-30 DIAGNOSIS — J84.10 PULMONARY FIBROSIS (HCC): ICD-10-CM

## 2023-01-30 DIAGNOSIS — E83.42 HYPOMAGNESEMIA: ICD-10-CM

## 2023-01-30 DIAGNOSIS — J67.9 HYPERSENSITIVITY PNEUMONITIS (HCC): ICD-10-CM

## 2023-01-30 DIAGNOSIS — I50.32 CHRONIC HEART FAILURE WITH PRESERVED EJECTION FRACTION (HCC): ICD-10-CM

## 2023-01-30 DIAGNOSIS — R73.03 PREDIABETES: ICD-10-CM

## 2023-01-30 DIAGNOSIS — J47.9 BRONCHIECTASIS WITHOUT COMPLICATION (HCC): ICD-10-CM

## 2023-01-30 DIAGNOSIS — J40 BRONCHITIS: ICD-10-CM

## 2023-01-30 DIAGNOSIS — E03.9 HYPOTHYROIDISM, UNSPECIFIED TYPE: ICD-10-CM

## 2023-01-30 PROCEDURE — 1123F ACP DISCUSS/DSCN MKR DOCD: CPT | Performed by: FAMILY MEDICINE

## 2023-01-30 PROCEDURE — 3074F SYST BP LT 130 MM HG: CPT | Performed by: FAMILY MEDICINE

## 2023-01-30 PROCEDURE — 99214 OFFICE O/P EST MOD 30 MIN: CPT | Performed by: FAMILY MEDICINE

## 2023-01-30 PROCEDURE — 3078F DIAST BP <80 MM HG: CPT | Performed by: FAMILY MEDICINE

## 2023-01-30 RX ORDER — POTASSIUM CHLORIDE 20 MEQ/1
20 TABLET, EXTENDED RELEASE ORAL DAILY PRN
Qty: 90 TABLET | Refills: 0 | Status: SHIPPED | OUTPATIENT
Start: 2023-01-30

## 2023-01-30 RX ORDER — BENZONATATE 200 MG/1
200 CAPSULE ORAL 2 TIMES DAILY PRN
Qty: 60 CAPSULE | Refills: 2 | Status: SHIPPED | OUTPATIENT
Start: 2023-01-30

## 2023-01-30 RX ORDER — FUROSEMIDE 40 MG/1
20 TABLET ORAL DAILY PRN
Qty: 90 TABLET | Refills: 0 | Status: SHIPPED | OUTPATIENT
Start: 2023-01-30

## 2023-01-30 SDOH — ECONOMIC STABILITY: TRANSPORTATION INSECURITY
IN THE PAST 12 MONTHS, HAS LACK OF TRANSPORTATION KEPT YOU FROM MEETINGS, WORK, OR FROM GETTING THINGS NEEDED FOR DAILY LIVING?: NO

## 2023-01-30 SDOH — ECONOMIC STABILITY: HOUSING INSECURITY
IN THE LAST 12 MONTHS, WAS THERE A TIME WHEN YOU DID NOT HAVE A STEADY PLACE TO SLEEP OR SLEPT IN A SHELTER (INCLUDING NOW)?: NO

## 2023-01-30 SDOH — ECONOMIC STABILITY: FOOD INSECURITY: WITHIN THE PAST 12 MONTHS, YOU WORRIED THAT YOUR FOOD WOULD RUN OUT BEFORE YOU GOT MONEY TO BUY MORE.: NEVER TRUE

## 2023-01-30 SDOH — ECONOMIC STABILITY: TRANSPORTATION INSECURITY
IN THE PAST 12 MONTHS, HAS THE LACK OF TRANSPORTATION KEPT YOU FROM MEDICAL APPOINTMENTS OR FROM GETTING MEDICATIONS?: NO

## 2023-01-30 SDOH — HEALTH STABILITY: PHYSICAL HEALTH: ON AVERAGE, HOW MANY MINUTES DO YOU ENGAGE IN EXERCISE AT THIS LEVEL?: 0 MIN

## 2023-01-30 SDOH — ECONOMIC STABILITY: FOOD INSECURITY: WITHIN THE PAST 12 MONTHS, THE FOOD YOU BOUGHT JUST DIDN'T LAST AND YOU DIDN'T HAVE MONEY TO GET MORE.: NEVER TRUE

## 2023-01-30 SDOH — HEALTH STABILITY: PHYSICAL HEALTH: ON AVERAGE, HOW MANY DAYS PER WEEK DO YOU ENGAGE IN MODERATE TO STRENUOUS EXERCISE (LIKE A BRISK WALK)?: 0 DAYS

## 2023-01-30 SDOH — ECONOMIC STABILITY: INCOME INSECURITY: IN THE LAST 12 MONTHS, WAS THERE A TIME WHEN YOU WERE NOT ABLE TO PAY THE MORTGAGE OR RENT ON TIME?: NO

## 2023-01-30 SDOH — ECONOMIC STABILITY: HOUSING INSECURITY: IN THE LAST 12 MONTHS, HOW MANY PLACES HAVE YOU LIVED?: 1

## 2023-01-30 ASSESSMENT — SOCIAL DETERMINANTS OF HEALTH (SDOH)
WITHIN THE LAST YEAR, HAVE TO BEEN RAPED OR FORCED TO HAVE ANY KIND OF SEXUAL ACTIVITY BY YOUR PARTNER OR EX-PARTNER?: NO
HOW OFTEN DO YOU ATTENT MEETINGS OF THE CLUB OR ORGANIZATION YOU BELONG TO?: NEVER
WITHIN THE LAST YEAR, HAVE YOU BEEN KICKED, HIT, SLAPPED, OR OTHERWISE PHYSICALLY HURT BY YOUR PARTNER OR EX-PARTNER?: NO
HOW OFTEN DO YOU GET TOGETHER WITH FRIENDS OR RELATIVES?: NEVER
IN A TYPICAL WEEK, HOW MANY TIMES DO YOU TALK ON THE PHONE WITH FAMILY, FRIENDS, OR NEIGHBORS?: ONCE A WEEK
HOW HARD IS IT FOR YOU TO PAY FOR THE VERY BASICS LIKE FOOD, HOUSING, MEDICAL CARE, AND HEATING?: NOT HARD AT ALL
WITHIN THE LAST YEAR, HAVE YOU BEEN AFRAID OF YOUR PARTNER OR EX-PARTNER?: NO
DO YOU BELONG TO ANY CLUBS OR ORGANIZATIONS SUCH AS CHURCH GROUPS UNIONS, FRATERNAL OR ATHLETIC GROUPS, OR SCHOOL GROUPS?: NO
WITHIN THE LAST YEAR, HAVE YOU BEEN HUMILIATED OR EMOTIONALLY ABUSED IN OTHER WAYS BY YOUR PARTNER OR EX-PARTNER?: NO
HOW OFTEN DO YOU ATTEND CHURCH OR RELIGIOUS SERVICES?: NEVER

## 2023-01-30 ASSESSMENT — PATIENT HEALTH QUESTIONNAIRE - PHQ9
3. TROUBLE FALLING OR STAYING ASLEEP: 0
6. FEELING BAD ABOUT YOURSELF - OR THAT YOU ARE A FAILURE OR HAVE LET YOURSELF OR YOUR FAMILY DOWN: 0
9. THOUGHTS THAT YOU WOULD BE BETTER OFF DEAD, OR OF HURTING YOURSELF: 0
SUM OF ALL RESPONSES TO PHQ QUESTIONS 1-9: 5
10. IF YOU CHECKED OFF ANY PROBLEMS, HOW DIFFICULT HAVE THESE PROBLEMS MADE IT FOR YOU TO DO YOUR WORK, TAKE CARE OF THINGS AT HOME, OR GET ALONG WITH OTHER PEOPLE: 0
5. POOR APPETITE OR OVEREATING: 0
SUM OF ALL RESPONSES TO PHQ QUESTIONS 1-9: 5
7. TROUBLE CONCENTRATING ON THINGS, SUCH AS READING THE NEWSPAPER OR WATCHING TELEVISION: 1
2. FEELING DOWN, DEPRESSED OR HOPELESS: 1
SUM OF ALL RESPONSES TO PHQ QUESTIONS 1-9: 5
8. MOVING OR SPEAKING SO SLOWLY THAT OTHER PEOPLE COULD HAVE NOTICED. OR THE OPPOSITE, BEING SO FIGETY OR RESTLESS THAT YOU HAVE BEEN MOVING AROUND A LOT MORE THAN USUAL: 0
1. LITTLE INTEREST OR PLEASURE IN DOING THINGS: 1
SUM OF ALL RESPONSES TO PHQ9 QUESTIONS 1 & 2: 2
4. FEELING TIRED OR HAVING LITTLE ENERGY: 2
SUM OF ALL RESPONSES TO PHQ QUESTIONS 1-9: 5

## 2023-01-30 ASSESSMENT — LIFESTYLE VARIABLES
HOW OFTEN DO YOU HAVE A DRINK CONTAINING ALCOHOL: NEVER
HOW MANY STANDARD DRINKS CONTAINING ALCOHOL DO YOU HAVE ON A TYPICAL DAY: PATIENT DOES NOT DRINK

## 2023-01-31 NOTE — PROGRESS NOTES
[address] [facility], [facility] [address]  Office : [phone]  Fax : [phone]      Subjective: The patient is a 67 y.o. female  who presents for f/u on multiple chronic medical conditions-good compliance with medications-no new concerns-pt here to get routeine labs and need refill on meds. no cardiopulmonary symptoms  Chronic respiratory failure-on o2  Pneumonitis /INTERSTITIAL disease/fibrosis=chronic cough  Pt seeing lung specialsi for persistent pneumonitis  Pt seen GI for scopes for anemia and elevated LFT--stable-need recheck  pts appetite if fair  Chronic rhinosinusitis-seen ENT --on nasal spray  Hypertension--Pt BP been controlled without meds  diabetes -pts blood sugar stable OFF MED  Hyperlipidemia--pts on low carb diet and low fat diet -stable on med  Gerd -stable on diet /med  Thyroid problem-hypo- stable  Anemia-on iron supplement  Diastolic dysfunction with pedal edema--table on lasix/potassium          Patient Active Problem List   Diagnosis Code    Gallstones K80.20    Prediabetes R73.03    Hypothyroidism E03.9    Essential hypertension I10    Dyslipidemia E78.5    Asthma J45.909    Psoriasis L40.9    Arthritis M19.90    Osteopenia M85.80    Hypokalemia E87.6    Vertigo R42    Anxiety F41.9    Presbycusis of right ear with unrestricted hearing of left ear H91.11    Allergic rhinitis J30.9    Physical exam Z00.00    Encounter for immunization Z23    ACP (advance care planning) Z71.89    Screening for breast cancer Z12.39    Chronic kidney disease N18.9    Environmental allergies Z91.09    Sensorineural hearing loss (SNHL) of both ears H90.3    Encounter for examination following treatment at hospital Z09    Pneumonia of both lungs due to infectious organism J18.9    Low magnesium level E82.8    Diastolic dysfunction E42.74    Current mild episode of major depressive disorder without prior episode (HCC) F32.0    Absolute anemia D64.9    Low calcium levels E83.51 Debility R53.81    Elevated AST (SGOT) R74.01    O2 dependent Z99.81    Encounter for screening colonoscopy Z12.11    Acute respiratory failure (HCC) J96.00    Abnormal CXR (chest x-ray) R93.89    Pneumonitis J18.9    Bronchiectasis without complication (HCC) Z40.7    Chronic respiratory failure with hypoxia (HCC) J96.11    ERIC (acute kidney injury) ([facility] [address] N17.9    Bacteremia due to Streptococcus R78.81, B95.5    Gastroenteritis K52.9    H/O Hashimoto thyroiditis Z86.39    H/O septic shock Z86.19    Heart failure with preserved ejection fraction (HCC) I50.30    Hyponatremia E87.1    Hypophosphatemia E95.60    Metabolic acidosis J62.9    Pulmonary infiltrate on chest x-ray R91.8    Septic shock (HCC) A41.9, R65.21    Thrombocytopenia (HCC) D69.6    Type 2 diabetes mellitus without complication, without long-term current use of insulin (HCC) E11.9    Frequent falls R29.6    Unsteady gait R26.81    Forgetfulness R68.89    SOBOE (shortness of breath on exertion) R06.02    Chronic cough R05.3       Past Medical History:   Diagnosis Date    Abdominal pain onset 6 weeks ago    more with eating    Anemia     Arthritis     osteo    Asthma     prn inhaler    Bronchiectasis (HCC)     Dyslipidemia     Fatigue     Gallstones     being ruled out per son    Hearing reduced     bilat    Hypertension     controlled with med    Hypothyroidism     Osteopenia     Prediabetes     no meds--- checks sqbs \" occ'-- per son usually goea with what the md says about her #s    Psoriasis     Psychiatric disorder     DEPRESSION    Pulmonary fibrosis (HCC)     PER NOTE IN CC FROM DR VIRGEN-- PT WAS TOLD THIS IN HER PAST-- HOME 2 AND PRN INHALER    Vertigo     hx-- no t a recent problem       Past Surgical History:   Procedure Laterality Date    COLONOSCOPY N/A 11/19/2020    COLONOSCOPY/ BMI 22 performed by Laura Olvera MD at [facility] ENDOSCOPY    ERCP  4/7/2021         HX ABDOMINOPLASTY      HX HYSTERECTOMY      Total    HX OTHER SURGICAL lung biopsy    HX SALPINGO-OOPHORECTOMY Bilateral     HX THYROIDECTOMY      total       Social History     Socioeconomic History    Marital status:      Spouse name: Not on file    Number of children: Not on file    Years of education: Not on file    Highest education level: Not on file   Occupational History    Not on file   Tobacco Use    Smoking status: Never Smoker    Smokeless tobacco: Never Used   Vaping Use    Vaping Use: Never used   Substance and Sexual Activity    Alcohol use: No    Drug use: No    Sexual activity: Not on file   Other Topics Concern    Not on file   Social History Narrative    Not on file     Social Determinants of Health     Financial Resource Strain: Low Risk     Difficulty of Paying Living Expenses: Not hard at all   Food Insecurity: No Food Insecurity    Worried About Running Out of Food in the Last Year: Never true    Ran Out of Food in the Last Year: Never true   Transportation Needs: No Transportation Needs    Lack of Transportation (Medical): No    Lack of Transportation (Non-Medical):  No   Physical Activity:     Days of Exercise per Week: Not on file    Minutes of Exercise per Session: Not on file   Stress:     Feeling of Stress : Not on file   Social Connections:     Frequency of Communication with Friends and Family: Not on file    Frequency of Social Gatherings with Friends and Family: Not on file    Attends Confucianism Services: Not on file    Active Member of Clubs or Organizations: Not on file    Attends Club or Organization Meetings: Not on file    Marital Status: Not on file   Intimate Partner Violence:     Fear of Current or Ex-Partner: Not on file    Emotionally Abused: Not on file    Physically Abused: Not on file    Sexually Abused: Not on file   Housing Stability:     Unable to Pay for Housing in the Last Year: Not on file    Number of [place] in the Last Year: Not on file    Unstable Housing in the Last Year: Not on file       Allergies   Allergen Reactions Hydrochlorothiazide Other (comments)     hypokalemia    Lisinopril Cough       Current Outpatient Medications   Medication Sig Dispense Refill    fluticasone propion-salmeteroL (Wixela Inhub) 250-50 mcg/dose diskus inhaler Take 1 Puff by inhalation two (2) times a day. atorvastatin (LIPITOR) 20 mg tablet Take 1 Tablet by mouth daily for 30 days. Indications: excessive fat in the blood 30 Tablet 0    levothyroxine (SYNTHROID) 50 mcg tablet Take 1 Tablet by mouth Daily (before breakfast). Dose decreased to 50 mcg on 1/5/22 based on TSH 90 Tablet 0    magnesium oxide (MAG-OX) 400 mg tablet Take 1 Tablet by mouth daily. 90 Tablet 0    metoprolol succinate (TOPROL-XL) 50 mg XL tablet Take 1 Tablet by mouth daily. Dose increased to 50 mg on 6/1/21 90 Tablet 0    potassium chloride SR (K-TAB) 20 mEq tablet Take 1 Tablet by mouth daily. 90 Tablet 0    benzonatate (Tessalon Perles) 100 mg capsule Take 1 Capsule by mouth two (2) times daily as needed for Cough. 30 Capsule 0    furosemide (LASIX) 40 mg tablet Take 0.5 Tablets by mouth daily. 90 Tablet 0    VITAMIN B COMPLEX PO Take  by mouth. With Vitamin C      guaifenesin (MUCINEX PO) Take 600 mg by mouth. sertraline (ZOLOFT) 100 mg tablet Take 1 Tablet by mouth daily. 90 Tablet 0    albuterol (PROVENTIL VENTOLIN) 2.5 mg /3 mL (0.083 %) nebu 3 mL by Nebulization route every four (4) hours as needed for Wheezing. 150 Nebule 2    albuterol (Ventolin HFA) 90 mcg/actuation inhaler Take 2 Puffs by inhalation every four (4) hours as needed for Wheezing. 1 Inhaler 2    Oxygen 2 Each by Nasal route continuous. 2 lpm cont            Review of Systems   Constitutional: Positive for malaise/fatigue. HENT: Negative. Eyes: Negative. Respiratory: Positive for cough and sputum production. Cardiovascular: Negative. Gastrointestinal: Negative. Genitourinary: Negative. Musculoskeletal: Negative. Skin: Negative. Neurological: Negative. Endo/Heme/Allergies: Negative. Psychiatric/Behavioral: Positive for depression. Negative for hallucinations, substance abuse and suicidal ideas. The patient is nervous/anxious. Objective:    Visit Vitals  BP (!) 144/78 (BP 1 Location: Right arm, BP Patient Position: Sitting, BP Cuff Size: Adult)   Pulse 79   Temp 97.5 °F (36.4 °C) (Temporal)   Resp 15   Ht 5' 1\" (1.549 m)   Wt 141 lb (64 kg)   SpO2 98%   BMI 26.64 kg/m²       Physical Exam   Constitutional: She is oriented to person, place, and time. She appears well-developed. HENT:   Head: Normocephalic and atraumatic. Right Ear: External ear normal.   Left Ear: External ear normal.   Nose: Nose normal.   Mouth/Throat: Oropharynx is clear and moist.   Eyes: Pupils are equal, round, and reactive to light. Conjunctivae and EOM are normal.   Neck: Normal range of motion. Neck supple. Cardiovascular: Normal rate, regular rhythm, normal heart sounds and intact distal pulses. Pulmonary/Chest: Effort normal and breath sounds normal.   Abdominal: Soft. Bowel sounds are normal.   Musculoskeletal: Normal range of motion. Neurological: She is alert and oriented to person, place, and time. She has normal reflexes. Skin: Skin is warm and dry. Psychiatric: Her behavior is normal. Judgment and thought content normal.   Depressed at times         . RESULTRCNTNC(15W    ASSESSMENT/PLAN:       1. Chronic respiratory failure with hypoxia (HCC)  Overview:  o2      Orders:  -     benzonatate (TESSALON) 200 MG capsule; Take 1 capsule by mouth 2 times daily as needed for Cough, Disp-60 capsule, R-2Normal  -     Comprehensive Metabolic Panel; Future  -     Lipid Panel; Future  -     TSH with Reflex; Future  -     CBC with Auto Differential; Future  -     Magnesium; Future  2. Chronic heart failure with preserved ejection fraction (HCC)  -     furosemide (LASIX) 40 MG tablet;  Take 0.5 tablets by mouth daily as needed (with potassium for leg swelling), Disp-90 tablet, R-0Normal  -     potassium chloride (KLOR-CON M) 20 MEQ extended release tablet; Take 1 tablet by mouth daily as needed (with lasix for leg swelling), Disp-90 tablet, R-0Normal  -     Comprehensive Metabolic Panel; Future  -     Lipid Panel; Future  -     TSH with Reflex; Future  -     CBC with Auto Differential; Future  -     Magnesium; Future  3. Pedal edema  -     furosemide (LASIX) 40 MG tablet; Take 0.5 tablets by mouth daily as needed (with potassium for leg swelling), Disp-90 tablet, R-0Normal  -     potassium chloride (KLOR-CON M) 20 MEQ extended release tablet; Take 1 tablet by mouth daily as needed (with lasix for leg swelling), Disp-90 tablet, R-0Normal  -     Comprehensive Metabolic Panel; Future  -     Lipid Panel; Future  -     TSH with Reflex; Future  -     CBC with Auto Differential; Future  -     Magnesium; Future  4. Bronchitis  Overview:  Steam in halations  Saline nasal cleansing  mucinex as needed  abx  Prednisone  F/u in 2 weeks      Orders:  -     benzonatate (TESSALON) 200 MG capsule; Take 1 capsule by mouth 2 times daily as needed for Cough, Disp-60 capsule, R-2Normal  -     Comprehensive Metabolic Panel; Future  -     Lipid Panel; Future  -     TSH with Reflex; Future  -     CBC with Auto Differential; Future  -     Magnesium; Future  5. Hypersensitivity pneumonitis (HCC)  Overview:  Sees pulmonologist  From aspergillus  On steroid inlaler      Orders:  -     benzonatate (TESSALON) 200 MG capsule; Take 1 capsule by mouth 2 times daily as needed for Cough, Disp-60 capsule, R-2Normal  -     Comprehensive Metabolic Panel; Future  -     Lipid Panel; Future  -     TSH with Reflex; Future  -     CBC with Auto Differential; Future  -     Magnesium; Future  6. Pulmonary fibrosis (HCC)  -     benzonatate (TESSALON) 200 MG capsule; Take 1 capsule by mouth 2 times daily as needed for Cough, Disp-60 capsule, R-2Normal  -     Comprehensive Metabolic Panel; Future  -     Lipid Panel;  Future  -  TSH with Reflex; Future  -     CBC with Auto Differential; Future  -     Magnesium; Future  7. Bronchiectasis without complication (HCC)  Overview:  wixwla did not help  Try only steroid inhaler      Orders:  -     benzonatate (TESSALON) 200 MG capsule; Take 1 capsule by mouth 2 times daily as needed for Cough, Disp-60 capsule, R-2Normal  -     Comprehensive Metabolic Panel; Future  -     Lipid Panel; Future  -     TSH with Reflex; Future  -     CBC with Auto Differential; Future  -     Magnesium; Future  8. Chronic cough  Overview:  bronchectasis  Tessalon perles      Orders:  -     benzonatate (TESSALON) 200 MG capsule; Take 1 capsule by mouth 2 times daily as needed for Cough, Disp-60 capsule, R-2Normal  -     Comprehensive Metabolic Panel; Future  -     Lipid Panel; Future  -     TSH with Reflex; Future  -     CBC with Auto Differential; Future  -     Magnesium; Future  9. Hypomagnesemia  Overview:  on supplement    10. Hyperlipidemia, unspecified hyperlipidemia type  Overview:  statin  11. Hypothyroidism, unspecified type  Overview:  Stable on levothyroxine  9/2021  TSH is high  Will increase levo to 75 mcg  1/2022  TSH low--levo decreased to 50 mcg      12. Prediabetes  Overview:  Off metformin 9/2020 since blood sugars are low      Orders:  -     Hemoglobin A1C; Future     Orders Placed This Encounter   Medications    furosemide (LASIX) 40 MG tablet     Sig: Take 0.5 tablets by mouth daily as needed (with potassium for leg swelling)     Dispense:  90 tablet     Refill:  0    potassium chloride (KLOR-CON M) 20 MEQ extended release tablet     Sig: Take 1 tablet by mouth daily as needed (with lasix for leg swelling)     Dispense:  90 tablet     Refill:  0    benzonatate (TESSALON) 200 MG capsule     Sig: Take 1 capsule by mouth 2 times daily as needed for Cough     Dispense:  60 capsule     Refill:  2      Orders Placed This Encounter   Procedures    Comprehensive Metabolic Panel     Standing Status:   Future  Standing Expiration Date:   1/30/2024    Lipid Panel     Standing Status:   Future     Standing Expiration Date:   1/30/2024     Order Specific Question:   Is Patient Fasting?/# of Hours     Answer:   0    TSH with Reflex     Standing Status:   Future     Standing Expiration Date:   1/30/2024    CBC with Auto Differential     Standing Status:   Future     Standing Expiration Date:   1/30/2024    Magnesium     Standing Status:   Future     Standing Expiration Date:   1/30/2024    Hemoglobin A1C     Standing Status:   Future     Standing Expiration Date:   1/30/2024      Results for orders placed or performed in visit on 08/31/21   CBC WITH AUTOMATED DIFF   Result Value Ref Range    WBC 6.7 3.4 - 10.8 x10E3/uL    RBC 5.23 3.77 - 5.28 x10E6/uL    HGB 13.9 11.1 - 15.9 g/dL    HCT 44.1 34.0 - 46.6 %    MCV 84 79 - 97 fL    MCH 26.6 26.6 - 33.0 pg    MCHC 31.5 31.5 - 35.7 g/dL    RDW 15.4 11.7 - 15.4 %    PLATELET 365 435 - 329 x10E3/uL    NEUTROPHILS 50 Not Estab. %    Lymphocytes 35 Not Estab. %    MONOCYTES 8 Not Estab. %    EOSINOPHILS 7 Not Estab. %    BASOPHILS 0 Not Estab. %    ABS. NEUTROPHILS 3.3 1.4 - 7.0 x10E3/uL    Abs Lymphocytes 2.4 0.7 - 3.1 x10E3/uL    ABS. MONOCYTES 0.6 0.1 - 0.9 x10E3/uL    ABS. EOSINOPHILS 0.4 0.0 - 0.4 x10E3/uL    ABS. BASOPHILS 0.0 0.0 - 0.2 x10E3/uL    IMMATURE GRANULOCYTES 0 Not Estab. %    ABS. IMM.  GRANS. 0.0 0.0 - 0.1 R16E7/LL   METABOLIC PANEL, COMPREHENSIVE   Result Value Ref Range    Glucose 85 65 - 99 mg/dL    BUN 22 8 - 27 mg/dL    Creatinine 1.07 (H) 0.57 - 1.00 mg/dL    GFR est non-AA 52 (L) >59 mL/min/1.73    GFR est AA 60 >59 mL/min/1.73    BUN/Creatinine ratio 21 12 - 28    Sodium 137 134 - 144 mmol/L    Potassium 4.3 3.5 - 5.2 mmol/L    Chloride 105 96 - 106 mmol/L    CO2 22 20 - 29 mmol/L    Calcium 8.7 8.7 - 10.3 mg/dL    Protein, total 7.9 6.0 - 8.5 g/dL    Albumin 4.2 3.7 - 4.7 g/dL    GLOBULIN, TOTAL 3.7 1.5 - 4.5 g/dL    A-G Ratio 1.1 (L) 1.2 - 2.2 Bilirubin, total 0.6 0.0 - 1.2 mg/dL    Alk. phosphatase 129 (H) 48 - 121 IU/L    AST (SGOT) 37 0 - 40 IU/L    ALT (SGPT) 24 0 - 32 IU/L   LIPID PANEL   Result Value Ref Range    Cholesterol, total 148 100 - 199 mg/dL    Triglyceride 65 0 - 149 mg/dL    HDL Cholesterol 71 >39 mg/dL    VLDL, calculated 13 5 - 40 mg/dL    LDL, calculated 64 0 - 99 mg/dL   T4, FREE   Result Value Ref Range    T4, Free 1.21 0.82 - 1.77 ng/dL   TSH 3RD GENERATION   Result Value Ref Range    TSH 20.900 (H) 0.450 - 4.500 uIU/mL   IRON   Result Value Ref Range    Iron 75 27 - 139 ug/dL   MAGNESIUM   Result Value Ref Range    Magnesium 1.9 1.6 - 2.3 mg/dL     Lab Results   Component Value Date    TSH 20.900 (H) 09/09/2021     CT Result (most recent):  CT CHEST WO CONTRAST 04/09/2021    Narrative  CT CHEST WITHOUT CONTRAST (HRCT) DATED 4/9/2021.    History: Hypersensitivity pneumonitis.    Comparison: CT chest without contrast 11/27/2020    Technique:   Multiple contiguous helical CT images reconstructed at 5 mm were  obtained from the neck base to the mid abdomen without the administration of  contrast. Additional thin section, axial high-resolution CT images were obtained  with the patient in the prone and supine position. All CT scans performed at  this facility use one or all of the following: Automated exposure control,  adjustment of the mA and/or kVp according to patient's size, iterative  reconstruction.    Findings:  CT Chest:  The base of the neck is unremarkable in appearance.  No evolving axillary, or  mediastinal lymphadenopathy is seen.  Evaluation of the paris is limited due to  noncontrast technique although no obvious bulky hilar changes are seen.  The  thoracic aorta is normal in caliber.    Evaluation with lung windows demonstrates persistent parenchymal changes.  Today's high resolution chest CT included prone and supine imaging without  inhalational and exhalational imaging as performed on the prior exam. Today's  images  are more consistent with exhalational images obtained on the prior exam.  Areas of persistent air trapping are suggested most evident in the periphery of  the upper lobes and lung bases. Regional areas of groundglass attenuation and  interstitial septal thickening are seen which are not significantly changed. Persistent bronchiectatic changes are seen most evident in the right middle lobe  where cystic bronchiectatic appearing changes which are not significantly  changed from the prior study. A stable right lower lobe suture line is seen to  just had prior wedge resection. No pleural effusion is seen. Lungs are expanded  without evidence for pneumothorax. No acute osseous abnormality is seen. Limited evaluation of the upper abdomen demonstrates no acute abnormality. Mild  pneumobilia is now seen without abnormal evolving bowel dilation likely related  to a recent ERCP. Nodular densities are seen in the samira hepatis suboptimally  assessed. These could either represent lymph nodes or sequela of cavernous  transformation of the portal vein. Impression  1. Parenchymal changes which are not significantly changed from a prior  comparison study whose imaging features are consistent with hypersensitivity  pneumonitis. No superimposed acute process is suggested. 2. Nodular densities in the samira hepatis which could either represent prominent  lymph nodes or cavernous transformation of the portal vein. New mild pneumobilia  is seen likely related to recent ERCP. This report was made using voice transcription. Despite my best efforts to avoid  any, transcription errors may persist. If there is any question about the  accuracy of the report or need for clarification, then please call [phone], or text me through perfectserv for clarification or correction.     Xray Result (most recent):  XR CHEST STANDARD TWO VW 05/27/2022    Narrative  Exam: XR CHEST (2 VW) on 5/27/2022 11:40 AM    Clinical History: The Female patient is 67years old  presenting for Bronchitis,  not specified as acute or chronic; Hypersensitivity pneumonitis due to  unspecified organic dust; Pulmonary fibrosis, unspecified; Bronchiectasis,  uncomplicated; Chronic cough; Chronic respiratory failure with hypoxia. Comparison:  Chest x-ray 1/14/2022    Findings:  Frontal and lateral views of the chest were obtained. Increasing groundglass interstitial infiltrates are seen bilaterally  superimposed on chronic lung disease. No pleural effusions are seen. The  cardiomediastinal silhouette is enlarged. There are no acute osseous  abnormalities. Impression  1. Developing CHF/volume overload. Superimposed inflammatory process cannot be  excluded. CPT code(s) 40095  Mammogram Result (most recent):  [facility] DIGITAL SCREEN W OR WO CAD BILATERAL 09/08/2021    Narrative  This is a summary report. The complete report is available in the patient's medical record. If you cannot access the medical record, please contact the sending organization for a detailed fax or copy. BILATERAL SCREENING DIGITAL MAMMOGRAPHY:    CLINICAL HISTORY:   Routine screening. TECHNIQUE:  Craniocaudal and mediolateral oblique views of both breasts were  performed and are interpreted in conjunction with a computer assisted detection  (CAD) system. COMPARISON:  Priors dating from April 18, 2016    FINDINGS:  CC and MLO views of both breasts demonstrate heterogeneously dense  fibroglandular tissue in a fairly symmetric pattern bilaterally. No new or  enlarging soft tissue mass, suspicious calcifications, or other definite  evidence for malignancy is seen. No significant change is seen from prior  study. Impression  NO SPECIFIC MAMMOGRAPHIC EVIDENCE FOR MALIGNANCY. FOLLOW UP BILATERAL SCREENING  MAMMOGRAPHY IS RECOMMENDED IN ONE YEAR. BI-RADS Assessment Category 1: Negative. A reminder letter will be scheduled.   BD3  Information about breast density will be included with the letter sent to the  patient with her mammogram results. US Result (most recent):  No results found for this or any previous visit from the past 3650 days. We discussed the expected course, resolution and complications of the diagnosis(es) in detail. Medication risks, benefits, costs, interactions, and alternatives were discussed as indicated. I advised her to contact the office if her condition worsens, changes or fails to improve as anticipated. She expressed understanding with the diagnosis(es) and plan. Return in about 3 months (around 4/30/2023), or VV is fine. f/u For recheck on CBC/LABS/TSH/ [id number] on results    Yazmin Mann MD

## 2023-02-10 ENCOUNTER — TELEPHONE (OUTPATIENT)
Dept: PRIMARY CARE CLINIC | Facility: CLINIC | Age: 73
End: 2023-02-10

## 2023-02-15 NOTE — PROGRESS NOTES
Name:  Alondra Jacobson  YOB: 1950   MRN: [id number]      Office Visit: 2/16/2023        ASSESSMENT AND PLAN:  (Medical Decision Making)    Impression:   Patient with history of IPF, with ongoing symptoms. Long discussion with patient and  regarding treatment options of anti-fibrotic therapy versus palliative care. Stressed to  and patient that there is no cure and goal is to manage symptoms and to give quality life.  and patient agree that this is their goal.  Therefore, will proceed with palliative management of symptoms. 1. Pulmonary fibrosis ([facility]  --will obtain new set of CPFTs because patient wants to participate in pulmonary rehab. In the meantime, she is given information about online pulmonary rehab. --start Hycodan 1/2 to 1 teaspoon qid--hold for sedation. Advised  how to contact me for Rx refill on this. --take stool softener daily. I have reviewed the patient's controlled substance prescription history, as maintained in the [facility], so that the prescription for a controlled substance can be given. - Full PFT Study With Bronchodilator; Future  - HYDROcodone homatropine (HYCODAN) 5-1.5 MG/5ML solution; Take 5 mLs by mouth 4 times daily as needed (cough) for up to 30 days. Max Daily Amount: 20 mLs  Dispense: 300 mL; Refill: 0    2. Chronic respiratory failure with hypoxia (HCC)  --continue O2 at 2 lpm for now. - Full PFT Study With Bronchodilator; Future  - HYDROcodone homatropine (HYCODAN) 5-1.5 MG/5ML solution; Take 5 mLs by mouth 4 times daily as needed (cough) for up to 30 days. Max Daily Amount: 20 mLs  Dispense: 300 mL; Refill: 0    3. Bronchiectasis, uncomplicated ([facility]  --start Advair HFA 2 puff bid--rationale for this is discussed with patient and .  --can use albuterol bid-qid. Given flutter valve to use 10 reps 2-4 times daily. - DME - DURABLE MEDICAL EQUIPMENT    4.  Palliative care encounter  --see above. Orders Placed This Encounter   Medications    HYDROcodone homatropine (HYCODAN) 5-1.5 MG/5ML solution     Sig: Take 5 mLs by mouth 4 times daily as needed (cough) for up to 30 days. Max Daily Amount: 20 mLs     Dispense:  300 mL     Refill:  0     Reduce doses taken as pain becomes manageable    fluticasone-salmeterol (ADVAIR HFA) 115-21 MCG/ACT inhaler     Sig: Inhale 2 puffs into the lungs 2 times daily     Dispense:  1 each     Refill:  11         Procedures    DME - DURABLE MEDICAL EQUIPMENT     Please provide pt with flutter device. 10 exhalations 3 times a day. Follow-up and Dispositions    Return for next available appt with Nida COUGHLIN/palliative care. Collaborating physician is Dr. Manuel Casas. DELORES Richter, NP, APRN - CNP    No specialty comments available. Total time for encounter on day of encounter was 74 minutes. This time includes chart prep, review of tests/procedures, review of other provider's notes, documentation and counseling patient regarding disease process and medications. _________________________________________________________________________    HISTORY OF PRESENT ILLNESS:    Ms. Omaira Perry is a 75 y.o. female who is seen at [facility] today for  Cough     The patient is a 75year old female who is seen for follow up of bronchiectasis, hypoxemia, and pulmonary fibrosis. She is accompanied by her , Jessica Stevenson. She has a history of HP with aspergillus fumigatus, treated with Prednisone. She is originally from [facility] and moved to the [address] [place] [address] in the mid 1970s. Was stationed at an [employer] here and was told at that time she may have pulmonary fibrosis. They requested to be seen by a different provider in our office and this is why I'm seeing her today. Since last visit in February, 2022, she has had persistent cough and TORRES. The dyspnea limits her activity and  states she has no quality of life. Has persistent nonproductive \"wet sounding\" cough. Stopped the Wixela due to cost.  Is not using chest percussion. Cannot walk more than the length of room to room without significant dyspnea. Remains on O2 at 2 lpm.      REVIEW OF SYSTEMS: 10 point review of systems is negative except as reported in HPI. PHYSICAL EXAM: Body mass index is 27.73 kg/m². Vitals:    02/16/23 1206   BP: 138/78   Pulse: 84   Resp: 19   Temp: 96.8 °F (36 °C)   TempSrc: Skin   SpO2: 91%   Weight: 142 lb (64.4 kg)   Height: 5' (1.524 m)     Patient is examined in wheelchair. General:   Alert, cooperative, no distress, appears stated age. Eyes:   Conjunctivae/corneas clear. PERRL        Mouth/Throat:  Lips, mucosa, and tongue normal. Teeth and gums normal.        Lungs:     Breath sounds with rhonchi bilaterally and crackles at bases. Heart:   Regular rate and rhythm, S1, S2 normal, no murmur, click, rub or gallop. Abdomen:    Soft, non-tender. Extremities:  Extremities normal, atraumatic, no cyanosis or edema. Skin:  Skin color normal. No rashes or lesions     Neurologic:  A&Ox3     DIAGNOSTIC TESTS:                                                                                    LABS:   Lab Results   Component Value Date/Time    WBC 6.7 09/09/2021 10:17 AM    HGB 13.9 09/09/2021 10:17 AM    HCT 44.1 09/09/2021 10:17 AM     09/09/2021 10:17 AM    TSH 20.900 09/09/2021 10:17 AM     Imaging: I performed an independent interpretation of the patient's images. 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     Comparison:  Chest x-ray 1/14/2022    Findings:  Frontal and lateral views of the chest were obtained. Increasing groundglass interstitial infiltrates are seen bilaterally  superimposed on chronic lung disease. No pleural effusions are seen. The  cardiomediastinal silhouette is enlarged. There are no acute osseous  abnormalities. Impression  1. Developing CHF/volume overload. Superimposed inflammatory process cannot be  excluded. CPT code(s) 23368    CT Chest:   CT CHEST WO CONTRAST 04/09/2021    Narrative  CT CHEST WITHOUT CONTRAST (HRCT) DATED 4/9/2021. History: Hypersensitivity pneumonitis. Comparison: CT chest without contrast 11/27/2020    Technique:   Multiple contiguous helical CT images reconstructed at 5 mm were  obtained from the neck base to the mid abdomen without the administration of  contrast. Additional thin section, axial high-resolution CT images were obtained  with the patient in the prone and supine position. All CT scans performed at  this facility use one or all of the following: Automated exposure control,  adjustment of the mA and/or kVp according to patient's size, iterative  reconstruction. Findings:  CT Chest:  The base of the neck is unremarkable in appearance. No evolving axillary, or  mediastinal lymphadenopathy is seen. Evaluation of the paris is limited due to  noncontrast technique although no obvious bulky hilar changes are seen. The  thoracic aorta is normal in caliber. Evaluation with lung windows demonstrates persistent parenchymal changes. Today's high resolution chest CT included prone and supine imaging without  inhalational and exhalational imaging as performed on the prior exam. Today's  images are more consistent with exhalational images obtained on the prior exam.  Areas of persistent air trapping are suggested most evident in the periphery of  the upper lobes and lung bases.  Regional areas of groundglass attenuation and  interstitial septal thickening are seen which are not significantly changed. Persistent bronchiectatic changes are seen most evident in the right middle lobe  where cystic bronchiectatic appearing changes which are not significantly  changed from the prior study. A stable right lower lobe suture line is seen to  just had prior wedge resection. No pleural effusion is seen. Lungs are expanded  without evidence for pneumothorax. No acute osseous abnormality is seen. Limited evaluation of the upper abdomen demonstrates no acute abnormality. Mild  pneumobilia is now seen without abnormal evolving bowel dilation likely related  to a recent ERCP. Nodular densities are seen in the samira hepatis suboptimally  assessed. These could either represent lymph nodes or sequela of cavernous  transformation of the portal vein. Impression  1. Parenchymal changes which are not significantly changed from a prior  comparison study whose imaging features are consistent with hypersensitivity  pneumonitis. No superimposed acute process is suggested. 2. Nodular densities in the samira hepatis which could either represent prominent  lymph nodes or cavernous transformation of the portal vein. New mild pneumobilia  is seen likely related to recent ERCP. This report was made using voice transcription. Despite my best efforts to avoid  any, transcription errors may persist. If there is any question about the  accuracy of the report or need for clarification, then please call [phone], or text me through [employer] for clarification or correction. Echo:   TRANSTHORACIC ECHOCARDIOGRAM (TTE) COMPLETE (CONTRAST/BUBBLE/3D PRN) 07/29/2022    Interpretation Summary    Left Ventricle: Normal left ventricular systolic function with a visually estimated EF of 65 - 70%. Left ventricle size is normal. Mildly increased wall thickness. Findings consistent with mild concentric hypertrophy. Normal wall motion. Left Atrium: Left atrium is mildly dilated.  LA Vol Index is  31 ml/m2.    IVC/SVC: IVC size is normal.    -No findings suggestive of any significant pulmonary hypertension.     [facility] Reference Info:                                                                                                                  Past Medical History:   Diagnosis Date    Abdominal pain onset 6 weeks ago    more with eating    Anemia     Arthritis     osteo    Asthma     prn inhaler    Bronchiectasis without complication (HCC)     oxygen 2 LPM continuous    Chronic kidney disease     stage 2-     Depression     Diastolic dysfunction     Dyslipidemia     Fatigue     Gallstones     being ruled out per son    H/O echocardiogram 12/2021    Raiza- 12/2021 Echo LVEF 65%    Hashimoto's thyroiditis     Hearing reduced     bilat    Heart failure ([address]     Heart failure with preserved ejection fraction-Echo LVEF 65%    Hypertension     controlled with med    Osteopenia     Prediabetes     no meds---A1C 6.5 4/2022 while hospitalized/ recent hx sepsis- previous A1C    Psoriasis     Pulmonary fibrosis (HCC)     PER NOTE IN CC FROM DR VIRGEN-- PT WAS TOLD THIS IN HER PAST-- HOME 2 AND PRN INHALER    Vertigo     hx-- no t a recent problem        Tobacco Use      Smoking status: Never      Smokeless tobacco: Never    Allergies   Allergen Reactions    Hydrochlorothiazide Other (See Comments)     hypokalemia    Lisinopril Other (See Comments)     Current Outpatient Medications   Medication Instructions    albuterol (PROVENTIL) 2.5 mg, Nebulization, EVERY 4 HOURS PRN    albuterol sulfate  (90 Base) MCG/ACT inhaler 2 puffs, Inhalation, EVERY 4 HOURS PRN    atorvastatin (LIPITOR) 20 mg, Oral, DAILY    B Complex Vitamins (VITAMIN B COMPLEX PO) Oral    benzonatate (TESSALON) 200 mg, Oral, 2 TIMES DAILY PRN    fluticasone-salmeterol (ADVAIR HFA) 115-21 MCG/ACT inhaler 2 puffs, Inhalation, 2 TIMES DAILY    furosemide (LASIX) 20 mg, Oral, DAILY PRN    guaiFENesin (MUCINEX) 1,200 mg, Oral, 2 TIMES DAILY    HYDROcodone homatropine (HYCODAN) 5-1.5 MG/5ML solution 5 mLs, Oral, 4 TIMES DAILY PRN    ipratropium (ATROVENT) 0.03 % nasal spray Nasal, DAILY PRN    levothyroxine (SYNTHROID) 50 mcg, Oral, DAILY BEFORE BREAKFAST    magnesium oxide (MAG-OX) 400 mg, Oral, DAILY    metoprolol succinate (TOPROL XL) 25 mg, Oral, DAILY, ( This is the correct script)    OXYGEN 2 each, Nasal, CONTINUOUS, 2 1/2 -3 liters    potassium chloride (KLOR-CON M) 20 MEQ extended release tablet 20 mEq, Oral, DAILY PRN    sertraline (ZOLOFT) 100 mg, Oral, DAILY

## 2023-02-16 ENCOUNTER — OFFICE VISIT (OUTPATIENT)
Dept: PULMONOLOGY | Age: 73
End: 2023-02-16
Payer: OTHER GOVERNMENT

## 2023-02-16 VITALS
HEART RATE: 84 BPM | WEIGHT: 142 LBS | HEIGHT: 60 IN | OXYGEN SATURATION: 91 % | RESPIRATION RATE: 19 BRPM | BODY MASS INDEX: 27.88 KG/M2 | SYSTOLIC BLOOD PRESSURE: 138 MMHG | DIASTOLIC BLOOD PRESSURE: 78 MMHG | TEMPERATURE: 96.8 F

## 2023-02-16 DIAGNOSIS — J47.9 BRONCHIECTASIS, UNCOMPLICATED (HCC): ICD-10-CM

## 2023-02-16 DIAGNOSIS — J84.10 PULMONARY FIBROSIS (HCC): Primary | ICD-10-CM

## 2023-02-16 DIAGNOSIS — J96.11 CHRONIC RESPIRATORY FAILURE WITH HYPOXIA (HCC): ICD-10-CM

## 2023-02-16 DIAGNOSIS — Z51.5 PALLIATIVE CARE ENCOUNTER: ICD-10-CM

## 2023-02-16 PROCEDURE — 3078F DIAST BP <80 MM HG: CPT | Performed by: NURSE PRACTITIONER

## 2023-02-16 PROCEDURE — 1123F ACP DISCUSS/DSCN MKR DOCD: CPT | Performed by: NURSE PRACTITIONER

## 2023-02-16 PROCEDURE — 3075F SYST BP GE 130 - 139MM HG: CPT | Performed by: NURSE PRACTITIONER

## 2023-02-16 PROCEDURE — 99215 OFFICE O/P EST HI 40 MIN: CPT | Performed by: NURSE PRACTITIONER

## 2023-02-16 RX ORDER — HYDROCODONE BITARTRATE AND HOMATROPINE METHYLBROMIDE ORAL SOLUTION 5; 1.5 MG/5ML; MG/5ML
5 LIQUID ORAL 4 TIMES DAILY PRN
Qty: 300 ML | Refills: 0 | Status: SHIPPED | OUTPATIENT
Start: 2023-02-16 | End: 2023-03-18

## 2023-02-22 DIAGNOSIS — R05.3 CHRONIC COUGH: ICD-10-CM

## 2023-02-22 DIAGNOSIS — J96.11 CHRONIC RESPIRATORY FAILURE WITH HYPOXIA (HCC): ICD-10-CM

## 2023-02-22 DIAGNOSIS — J84.10 PULMONARY FIBROSIS (HCC): ICD-10-CM

## 2023-02-22 DIAGNOSIS — J47.9 BRONCHIECTASIS WITHOUT COMPLICATION (HCC): ICD-10-CM

## 2023-02-22 DIAGNOSIS — J67.9 HYPERSENSITIVITY PNEUMONITIS (HCC): ICD-10-CM

## 2023-02-22 DIAGNOSIS — I50.32 CHRONIC HEART FAILURE WITH PRESERVED EJECTION FRACTION (HCC): ICD-10-CM

## 2023-02-22 DIAGNOSIS — J40 BRONCHITIS: ICD-10-CM

## 2023-02-22 DIAGNOSIS — R60.0 PEDAL EDEMA: ICD-10-CM

## 2023-02-22 DIAGNOSIS — R73.03 PREDIABETES: ICD-10-CM

## 2023-02-22 LAB
ALBUMIN SERPL-MCNC: 3.9 G/DL (ref 3.2–4.6)
ALBUMIN/GLOB SERPL: 0.8 (ref 0.4–1.6)
ALP SERPL-CCNC: 126 U/L (ref 50–136)
ALT SERPL-CCNC: 31 U/L (ref 12–65)
ANION GAP SERPL CALC-SCNC: 3 MMOL/L (ref 2–11)
AST SERPL-CCNC: 36 U/L (ref 15–37)
BASOPHILS # BLD: 0 K/UL (ref 0–0.2)
BASOPHILS NFR BLD: 0 % (ref 0–2)
BILIRUB SERPL-MCNC: 0.8 MG/DL (ref 0.2–1.1)
BUN SERPL-MCNC: 41 MG/DL (ref 8–23)
CALCIUM SERPL-MCNC: 9.1 MG/DL (ref 8.3–10.4)
CHLORIDE SERPL-SCNC: 108 MMOL/L (ref 101–110)
CHOLEST SERPL-MCNC: 174 MG/DL
CO2 SERPL-SCNC: 27 MMOL/L (ref 21–32)
CREAT SERPL-MCNC: 1.7 MG/DL (ref 0.6–1)
DIFFERENTIAL METHOD BLD: ABNORMAL
EOSINOPHIL # BLD: 0.6 K/UL (ref 0–0.8)
EOSINOPHIL NFR BLD: 5 % (ref 0.5–7.8)
ERYTHROCYTE [DISTWIDTH] IN BLOOD BY AUTOMATED COUNT: 15.2 % (ref 11.9–14.6)
EST. AVERAGE GLUCOSE BLD GHB EST-MCNC: 114 MG/DL
GLOBULIN SER CALC-MCNC: 5.1 G/DL (ref 2.8–4.5)
GLUCOSE SERPL-MCNC: 107 MG/DL (ref 65–100)
HBA1C MFR BLD: 5.6 % (ref 4.8–5.6)
HCT VFR BLD AUTO: 42.9 % (ref 35.8–46.3)
HDLC SERPL-MCNC: 79 MG/DL (ref 40–60)
HDLC SERPL: 2.2
HGB BLD-MCNC: 13.6 G/DL (ref 11.7–15.4)
IMM GRANULOCYTES # BLD AUTO: 0 K/UL (ref 0–0.5)
IMM GRANULOCYTES NFR BLD AUTO: 0 % (ref 0–5)
LDLC SERPL CALC-MCNC: 82.2 MG/DL
LYMPHOCYTES # BLD: 4 K/UL (ref 0.5–4.6)
LYMPHOCYTES NFR BLD: 34 % (ref 13–44)
MAGNESIUM SERPL-MCNC: 2.4 MG/DL (ref 1.8–2.4)
MCH RBC QN AUTO: 27.3 PG (ref 26.1–32.9)
MCHC RBC AUTO-ENTMCNC: 31.7 G/DL (ref 31.4–35)
MCV RBC AUTO: 86.1 FL (ref 82–102)
MONOCYTES # BLD: 0.9 K/UL (ref 0.1–1.3)
MONOCYTES NFR BLD: 7 % (ref 4–12)
NEUTS SEG # BLD: 6.3 K/UL (ref 1.7–8.2)
NEUTS SEG NFR BLD: 54 % (ref 43–78)
NRBC # BLD: 0 K/UL (ref 0–0.2)
PLATELET # BLD AUTO: 180 K/UL (ref 150–450)
PMV BLD AUTO: 12.4 FL (ref 9.4–12.3)
POTASSIUM SERPL-SCNC: 4.1 MMOL/L (ref 3.5–5.1)
PROT SERPL-MCNC: 9 G/DL (ref 6.3–8.2)
RBC # BLD AUTO: 4.98 M/UL (ref 4.05–5.2)
SODIUM SERPL-SCNC: 138 MMOL/L (ref 133–143)
TRIGL SERPL-MCNC: 64 MG/DL (ref 35–150)
TSH W FREE THYROID IF ABNORMAL: 3.33 UIU/ML (ref 0.36–3.74)
VLDLC SERPL CALC-MCNC: 12.8 MG/DL (ref 6–23)
WBC # BLD AUTO: 11.8 K/UL (ref 4.3–11.1)

## 2023-02-23 DIAGNOSIS — N18.9 CHRONIC KIDNEY DISEASE, UNSPECIFIED CKD STAGE: Primary | ICD-10-CM

## 2023-02-24 ENCOUNTER — TELEPHONE (OUTPATIENT)
Dept: PRIMARY CARE CLINIC | Facility: CLINIC | Age: 73
End: 2023-02-24

## 2023-02-24 NOTE — TELEPHONE ENCOUNTER
----- Message from Elizabet Swenson MD sent at 2/23/2023  6:23 PM EST -----  Pts kidney function has declined-possible dehydration-ask pt to balance fluid intake  + pt to go for recheck of kidney function in a week  BMP ORDERED

## 2023-03-07 DIAGNOSIS — N18.9 CHRONIC KIDNEY DISEASE, UNSPECIFIED CKD STAGE: ICD-10-CM

## 2023-03-08 LAB
ANION GAP SERPL CALC-SCNC: 6 MMOL/L (ref 2–11)
BUN SERPL-MCNC: 24 MG/DL (ref 8–23)
CALCIUM SERPL-MCNC: 8.9 MG/DL (ref 8.3–10.4)
CHLORIDE SERPL-SCNC: 111 MMOL/L (ref 101–110)
CO2 SERPL-SCNC: 22 MMOL/L (ref 21–32)
CREAT SERPL-MCNC: 1.2 MG/DL (ref 0.6–1)
GLUCOSE SERPL-MCNC: 102 MG/DL (ref 65–100)
POTASSIUM SERPL-SCNC: 4.4 MMOL/L (ref 3.5–5.1)
SODIUM SERPL-SCNC: 139 MMOL/L (ref 133–143)

## 2023-03-28 ENCOUNTER — OFFICE VISIT (OUTPATIENT)
Age: 73
End: 2023-03-28
Payer: OTHER GOVERNMENT

## 2023-03-28 VITALS
HEIGHT: 61 IN | BODY MASS INDEX: 26.62 KG/M2 | DIASTOLIC BLOOD PRESSURE: 81 MMHG | SYSTOLIC BLOOD PRESSURE: 137 MMHG | WEIGHT: 141 LBS | HEART RATE: 67 BPM | OXYGEN SATURATION: 92 % | TEMPERATURE: 97.4 F | RESPIRATION RATE: 14 BRPM

## 2023-03-28 DIAGNOSIS — R05.3 CHRONIC COUGH: ICD-10-CM

## 2023-03-28 DIAGNOSIS — J84.10 PULMONARY FIBROSIS (HCC): ICD-10-CM

## 2023-03-28 DIAGNOSIS — J47.9 BRONCHIECTASIS WITHOUT COMPLICATION (HCC): ICD-10-CM

## 2023-03-28 DIAGNOSIS — R06.02 SOBOE (SHORTNESS OF BREATH ON EXERTION): ICD-10-CM

## 2023-03-28 DIAGNOSIS — J31.0 RHINITIS, UNSPECIFIED TYPE: ICD-10-CM

## 2023-03-28 DIAGNOSIS — Z51.5 PALLIATIVE CARE PATIENT: ICD-10-CM

## 2023-03-28 PROCEDURE — 3075F SYST BP GE 130 - 139MM HG: CPT | Performed by: NURSE PRACTITIONER

## 2023-03-28 PROCEDURE — 99215 OFFICE O/P EST HI 40 MIN: CPT | Performed by: NURSE PRACTITIONER

## 2023-03-28 PROCEDURE — 1123F ACP DISCUSS/DSCN MKR DOCD: CPT | Performed by: NURSE PRACTITIONER

## 2023-03-28 PROCEDURE — 3079F DIAST BP 80-89 MM HG: CPT | Performed by: NURSE PRACTITIONER

## 2023-03-28 RX ORDER — AZELASTINE 1 MG/ML
2 SPRAY, METERED NASAL 2 TIMES DAILY
Qty: 1 EACH | Refills: 11 | Status: SHIPPED | OUTPATIENT
Start: 2023-03-28

## 2023-03-28 RX ORDER — HYDROCODONE BITARTRATE AND HOMATROPINE METHYLBROMIDE ORAL SOLUTION 5; 1.5 MG/5ML; MG/5ML
5 LIQUID ORAL 3 TIMES DAILY
Qty: 450 ML | Refills: 0 | Status: SHIPPED | OUTPATIENT
Start: 2023-03-28 | End: 2023-04-27

## 2023-03-28 ASSESSMENT — ENCOUNTER SYMPTOMS
SHORTNESS OF BREATH: 1
GASTROINTESTINAL NEGATIVE: 1
COUGH: 1

## 2023-03-28 NOTE — PROGRESS NOTES
Speech: Speech normal.          On this date 3/28/2023 I have spent 50 minutes reviewing previous notes, test results and face to face with the patient discussing the diagnosis and importance of compliance with the treatment plan as well as documenting on the day of the visit. An electronic signature was used to authenticate this note.     --KIAN Thompson - CNP

## 2023-03-31 ENCOUNTER — TELEPHONE (OUTPATIENT)
Dept: PRIMARY CARE CLINIC | Facility: CLINIC | Age: 73
End: 2023-03-31

## 2023-03-31 DIAGNOSIS — E83.42 HYPOMAGNESEMIA: ICD-10-CM

## 2023-03-31 RX ORDER — LANOLIN ALCOHOL/MO/W.PET/CERES
400 CREAM (GRAM) TOPICAL DAILY
Qty: 90 TABLET | Refills: 1 | Status: SHIPPED | OUTPATIENT
Start: 2023-03-31

## 2023-03-31 NOTE — TELEPHONE ENCOUNTER
Magnesium 400 mg=1 tab daily. Please send the prescription to [facility].   Next appointment is on 4/28/23

## 2023-04-28 ENCOUNTER — TELEMEDICINE (OUTPATIENT)
Dept: PRIMARY CARE CLINIC | Facility: CLINIC | Age: 73
End: 2023-04-28
Payer: OTHER GOVERNMENT

## 2023-04-28 DIAGNOSIS — J96.11 CHRONIC RESPIRATORY FAILURE WITH HYPOXIA (HCC): Primary | ICD-10-CM

## 2023-04-28 DIAGNOSIS — J40 BRONCHITIS: ICD-10-CM

## 2023-04-28 DIAGNOSIS — J84.10 PULMONARY FIBROSIS (HCC): ICD-10-CM

## 2023-04-28 DIAGNOSIS — J67.9 HYPERSENSITIVITY PNEUMONITIS (HCC): ICD-10-CM

## 2023-04-28 DIAGNOSIS — I50.32 CHRONIC HEART FAILURE WITH PRESERVED EJECTION FRACTION (HCC): ICD-10-CM

## 2023-04-28 DIAGNOSIS — E83.42 HYPOMAGNESEMIA: ICD-10-CM

## 2023-04-28 DIAGNOSIS — R73.03 PREDIABETES: ICD-10-CM

## 2023-04-28 DIAGNOSIS — R05.3 CHRONIC COUGH: ICD-10-CM

## 2023-04-28 DIAGNOSIS — I10 ESSENTIAL HYPERTENSION: ICD-10-CM

## 2023-04-28 DIAGNOSIS — E78.5 HYPERLIPIDEMIA, UNSPECIFIED HYPERLIPIDEMIA TYPE: ICD-10-CM

## 2023-04-28 DIAGNOSIS — R60.0 PEDAL EDEMA: ICD-10-CM

## 2023-04-28 DIAGNOSIS — E03.9 HYPOTHYROIDISM, UNSPECIFIED TYPE: ICD-10-CM

## 2023-04-28 DIAGNOSIS — F32.0 CURRENT MILD EPISODE OF MAJOR DEPRESSIVE DISORDER WITHOUT PRIOR EPISODE (HCC): ICD-10-CM

## 2023-04-28 DIAGNOSIS — J47.9 BRONCHIECTASIS WITHOUT COMPLICATION (HCC): ICD-10-CM

## 2023-04-28 PROCEDURE — 99214 OFFICE O/P EST MOD 30 MIN: CPT | Performed by: FAMILY MEDICINE

## 2023-04-28 PROCEDURE — 1123F ACP DISCUSS/DSCN MKR DOCD: CPT | Performed by: FAMILY MEDICINE

## 2023-04-28 RX ORDER — LANOLIN ALCOHOL/MO/W.PET/CERES
400 CREAM (GRAM) TOPICAL DAILY
Qty: 90 TABLET | Refills: 1 | Status: SHIPPED | OUTPATIENT
Start: 2023-04-28

## 2023-04-28 RX ORDER — FUROSEMIDE 40 MG/1
20 TABLET ORAL DAILY PRN
Qty: 90 TABLET | Refills: 1 | Status: SHIPPED | OUTPATIENT
Start: 2023-04-28

## 2023-04-28 RX ORDER — BENZONATATE 200 MG/1
200 CAPSULE ORAL 2 TIMES DAILY PRN
Qty: 180 CAPSULE | Refills: 0 | Status: SHIPPED | OUTPATIENT
Start: 2023-04-28

## 2023-04-28 RX ORDER — SERTRALINE HYDROCHLORIDE 100 MG/1
100 TABLET, FILM COATED ORAL DAILY
Qty: 90 TABLET | Refills: 1 | Status: SHIPPED | OUTPATIENT
Start: 2023-04-28

## 2023-04-28 RX ORDER — LEVOTHYROXINE SODIUM 0.05 MG/1
50 TABLET ORAL
Qty: 90 TABLET | Refills: 1 | Status: SHIPPED | OUTPATIENT
Start: 2023-04-28

## 2023-04-28 RX ORDER — POTASSIUM CHLORIDE 20 MEQ/1
20 TABLET, EXTENDED RELEASE ORAL DAILY PRN
Qty: 90 TABLET | Refills: 1 | Status: SHIPPED | OUTPATIENT
Start: 2023-04-28

## 2023-04-28 RX ORDER — ATORVASTATIN CALCIUM 20 MG/1
20 TABLET, FILM COATED ORAL DAILY
Qty: 90 TABLET | Refills: 1 | Status: SHIPPED | OUTPATIENT
Start: 2023-04-28 | End: 2023-07-28

## 2023-04-28 RX ORDER — METOPROLOL SUCCINATE 50 MG/1
25 TABLET, EXTENDED RELEASE ORAL DAILY
Qty: 45 TABLET | Refills: 1 | Status: SHIPPED | OUTPATIENT
Start: 2023-04-28

## 2023-04-28 ASSESSMENT — PATIENT HEALTH QUESTIONNAIRE - PHQ9
9. THOUGHTS THAT YOU WOULD BE BETTER OFF DEAD, OR OF HURTING YOURSELF: 0
4. FEELING TIRED OR HAVING LITTLE ENERGY: 2
SUM OF ALL RESPONSES TO PHQ QUESTIONS 1-9: 5
SUM OF ALL RESPONSES TO PHQ9 QUESTIONS 1 & 2: 2
2. FEELING DOWN, DEPRESSED OR HOPELESS: 1
8. MOVING OR SPEAKING SO SLOWLY THAT OTHER PEOPLE COULD HAVE NOTICED. OR THE OPPOSITE, BEING SO FIGETY OR RESTLESS THAT YOU HAVE BEEN MOVING AROUND A LOT MORE THAN USUAL: 0
3. TROUBLE FALLING OR STAYING ASLEEP: 0
6. FEELING BAD ABOUT YOURSELF - OR THAT YOU ARE A FAILURE OR HAVE LET YOURSELF OR YOUR FAMILY DOWN: 0
1. LITTLE INTEREST OR PLEASURE IN DOING THINGS: 1
SUM OF ALL RESPONSES TO PHQ QUESTIONS 1-9: 5
SUM OF ALL RESPONSES TO PHQ QUESTIONS 1-9: 5
5. POOR APPETITE OR OVEREATING: 0
SUM OF ALL RESPONSES TO PHQ QUESTIONS 1-9: 5
10. IF YOU CHECKED OFF ANY PROBLEMS, HOW DIFFICULT HAVE THESE PROBLEMS MADE IT FOR YOU TO DO YOUR WORK, TAKE CARE OF THINGS AT HOME, OR GET ALONG WITH OTHER PEOPLE: 0
7. TROUBLE CONCENTRATING ON THINGS, SUCH AS READING THE NEWSPAPER OR WATCHING TELEVISION: 1

## 2023-04-28 NOTE — PROGRESS NOTES
Frontal and lateral views of the chest were obtained. Increasing groundglass interstitial infiltrates are seen bilaterally  superimposed on chronic lung disease. No pleural effusions are seen. The  cardiomediastinal silhouette is enlarged. There are no acute osseous  abnormalities. Impression  1. Developing CHF/volume overload. Superimposed inflammatory process cannot be  excluded. CPT code(s) 84756    Mammogram Result (most recent):  NANI DIGITAL SCREEN W OR WO CAD BILATERAL 09/08/2021    Narrative  This is a summary report. The complete report is available in the patient's medical record. If you cannot access the medical record, please contact the sending organization for a detailed fax or copy. BILATERAL SCREENING DIGITAL MAMMOGRAPHY:    CLINICAL HISTORY:   Routine screening. TECHNIQUE:  Craniocaudal and mediolateral oblique views of both breasts were  performed and are interpreted in conjunction with a computer assisted detection  (CAD) system. COMPARISON:  Priors dating from April 18, 2016    FINDINGS:  CC and MLO views of both breasts demonstrate heterogeneously dense  fibroglandular tissue in a fairly symmetric pattern bilaterally. No new or  enlarging soft tissue mass, suspicious calcifications, or other definite  evidence for malignancy is seen. No significant change is seen from prior  study. Impression  NO SPECIFIC MAMMOGRAPHIC EVIDENCE FOR MALIGNANCY. FOLLOW UP BILATERAL SCREENING  MAMMOGRAPHY IS RECOMMENDED IN ONE YEAR. BI-RADS Assessment Category 1: Negative. A reminder letter will be scheduled. BD3  Information about breast density will be included with the letter sent to the  patient with her mammogram results. US Result (most recent):  No results found for this or any previous visit from the past 3650 days. We discussed the expected course, resolution and complications of the diagnosis(es) in detail.   Medication risks, benefits, costs, interactions, and

## 2023-05-02 ENCOUNTER — TELEMEDICINE (OUTPATIENT)
Age: 73
End: 2023-05-02
Payer: OTHER GOVERNMENT

## 2023-05-02 DIAGNOSIS — Z99.81 O2 DEPENDENT: ICD-10-CM

## 2023-05-02 DIAGNOSIS — J84.10 PULMONARY FIBROSIS (HCC): ICD-10-CM

## 2023-05-02 DIAGNOSIS — J47.9 BRONCHIECTASIS WITHOUT COMPLICATION (HCC): Primary | ICD-10-CM

## 2023-05-02 DIAGNOSIS — Z51.5 PALLIATIVE CARE PATIENT: ICD-10-CM

## 2023-05-02 PROCEDURE — 1123F ACP DISCUSS/DSCN MKR DOCD: CPT | Performed by: NURSE PRACTITIONER

## 2023-05-02 PROCEDURE — 99214 OFFICE O/P EST MOD 30 MIN: CPT | Performed by: NURSE PRACTITIONER

## 2023-05-02 RX ORDER — HYDROCODONE BITARTRATE AND HOMATROPINE METHYLBROMIDE ORAL SOLUTION 5; 1.5 MG/5ML; MG/5ML
5 LIQUID ORAL 3 TIMES DAILY
Qty: 450 ML | Refills: 0 | Status: SHIPPED | OUTPATIENT
Start: 2023-06-01 | End: 2023-07-01

## 2023-05-02 RX ORDER — HYDROCODONE BITARTRATE AND HOMATROPINE METHYLBROMIDE ORAL SOLUTION 5; 1.5 MG/5ML; MG/5ML
5 LIQUID ORAL 3 TIMES DAILY
Qty: 450 ML | Refills: 0 | Status: SHIPPED | OUTPATIENT
Start: 2023-05-02 | End: 2023-06-01

## 2023-05-02 NOTE — PROGRESS NOTES
Marlee Stevenson (:  1950) is a Established patient, presenting virtually for evaluation of the following:    Assessment & Plan   Below is the assessment and plan developed based on review of pertinent history, physical exam, labs, studies, and medications. 1. Bronchiectasis without complication ([address] - Doing well at this time. Cough medication controlling symptoms.  -     HYDROcodone homatropine (HYCODAN) 5-1.5 MG/5ML solution; Take 5 mLs by mouth 3 times daily for 30 days. Z51.5 Palliative Care Max Daily Amount: 15 mLs, Disp-450 mL, R-0Normal  -     HYDROcodone homatropine (HYCODAN) 5-1.5 MG/5ML solution; Take 5 mLs by mouth 3 times daily for 30 days. Z51.5 Palliative Care Max Daily Amount: 15 mLs, Disp-450 mL, R-0Normal    2. Pulmonary fibrosis (HCC) - Cough syrup controlling symptoms.  -     HYDROcodone homatropine (HYCODAN) 5-1.5 MG/5ML solution; Take 5 mLs by mouth 3 times daily for 30 days. Z51.5 Palliative Care Max Daily Amount: 15 mLs, Disp-450 mL, R-0Normal  -     HYDROcodone homatropine (HYCODAN) 5-1.5 MG/5ML solution; Take 5 mLs by mouth 3 times daily for 30 days. Z51.5 Palliative Care Max Daily Amount: 15 mLs, Disp-450 mL, R-0Normal    3. O2 dependent - Doing well at this time.  -     HYDROcodone homatropine (HYCODAN) 5-1.5 MG/5ML solution; Take 5 mLs by mouth 3 times daily for 30 days. Z51.5 Palliative Care Max Daily Amount: 15 mLs, Disp-450 mL, R-0Normal  -     HYDROcodone homatropine (HYCODAN) 5-1.5 MG/5ML solution; Take 5 mLs by mouth 3 times daily for 30 days. Z51.5 Palliative Care Max Daily Amount: 15 mLs, Disp-450 mL, R-0Normal    4. Palliative care patient  -     HYDROcodone homatropine (HYCODAN) 5-1.5 MG/5ML solution; Take 5 mLs by mouth 3 times daily for 30 days. Z51.5 Palliative Care Max Daily Amount: 15 mLs, Disp-450 mL, R-0Normal  -     HYDROcodone homatropine (HYCODAN) 5-1.5 MG/5ML solution; Take 5 mLs by mouth 3 times daily for 30 days.  Z51.5 Palliative Care Max Daily Amount:

## 2023-07-05 ENCOUNTER — TELEPHONE (OUTPATIENT)
Dept: PULMONOLOGY | Age: 73
End: 2023-07-05

## 2023-07-05 RX ORDER — PREDNISONE 10 MG/1
TABLET ORAL
Qty: 30 TABLET | Refills: 0 | Status: SHIPPED | OUTPATIENT
Start: 2023-07-05

## 2023-07-05 NOTE — TELEPHONE ENCOUNTER
Patient is having to use 5L of 02. When she gets up and moves around it drops down low.   Asking to get another 02 concentrator that goes up higher than 5L

## 2023-07-05 NOTE — TELEPHONE ENCOUNTER
Spoke with Adam stapleton [facility] who states that this DME does have 10L concentrators. Pt will need to re-qualify for more than 5L to receive this concentrator.

## 2023-07-05 NOTE — TELEPHONE ENCOUNTER
Review with Palliative NP via phone call--- pt has appt next week withNP as VV, this needs to be changed to in person for increased o2 requirements, needs qualification test for oxygen. NP would like to have pt start a 40mg prednisone taper as well. Spoke with Jose Arechiga and explained recommendations. verbalized understanding.

## 2023-07-13 ENCOUNTER — HOSPICE ADMISSION (OUTPATIENT)
Dept: HOSPICE | Facility: HOSPICE | Age: 73
End: 2023-07-13
Payer: OTHER GOVERNMENT

## 2023-07-13 ENCOUNTER — OFFICE VISIT (OUTPATIENT)
Age: 73
End: 2023-07-13

## 2023-07-13 DIAGNOSIS — Z99.81 O2 DEPENDENT: ICD-10-CM

## 2023-07-13 DIAGNOSIS — J96.11 CHRONIC RESPIRATORY FAILURE WITH HYPOXIA (HCC): ICD-10-CM

## 2023-07-13 DIAGNOSIS — J67.9 HYPERSENSITIVITY PNEUMONITIS (HCC): ICD-10-CM

## 2023-07-13 DIAGNOSIS — Z51.5 PALLIATIVE CARE PATIENT: ICD-10-CM

## 2023-07-13 DIAGNOSIS — R06.02 SOBOE (SHORTNESS OF BREATH ON EXERTION): ICD-10-CM

## 2023-07-13 DIAGNOSIS — J47.9 BRONCHIECTASIS WITHOUT COMPLICATION (HCC): ICD-10-CM

## 2023-07-13 DIAGNOSIS — J84.10 PULMONARY FIBROSIS (HCC): Primary | ICD-10-CM

## 2023-07-13 RX ORDER — HYDROCODONE BITARTRATE AND HOMATROPINE METHYLBROMIDE ORAL SOLUTION 5; 1.5 MG/5ML; MG/5ML
5 LIQUID ORAL 3 TIMES DAILY
Qty: 450 ML | Refills: 0 | Status: SHIPPED | OUTPATIENT
Start: 2023-09-16 | End: 2023-10-16

## 2023-07-13 RX ORDER — HYDROCODONE BITARTRATE AND HOMATROPINE METHYLBROMIDE ORAL SOLUTION 5; 1.5 MG/5ML; MG/5ML
5 LIQUID ORAL 3 TIMES DAILY
Qty: 450 ML | Refills: 0 | Status: SHIPPED | OUTPATIENT
Start: 2023-07-19 | End: 2023-08-18

## 2023-07-13 RX ORDER — AZELASTINE 1 MG/ML
2 SPRAY, METERED NASAL 2 TIMES DAILY
Qty: 120 ML | Refills: 1 | Status: SHIPPED | OUTPATIENT
Start: 2023-07-13

## 2023-07-13 RX ORDER — HYDROCODONE BITARTRATE AND HOMATROPINE METHYLBROMIDE ORAL SOLUTION 5; 1.5 MG/5ML; MG/5ML
5 LIQUID ORAL 3 TIMES DAILY
Qty: 450 ML | Refills: 0 | Status: SHIPPED | OUTPATIENT
Start: 2023-08-17 | End: 2023-09-16

## 2023-07-13 RX ORDER — MORPHINE SULFATE 100 MG/5ML
5 SOLUTION ORAL
Qty: 30 ML | Refills: 0 | Status: SHIPPED | OUTPATIENT
Start: 2023-07-13 | End: 2023-08-12

## 2023-07-13 ASSESSMENT — ENCOUNTER SYMPTOMS
RHINORRHEA: 1
COUGH: 1
STRIDOR: 0
SHORTNESS OF BREATH: 1
WHEEZING: 0

## 2023-07-13 NOTE — PROGRESS NOTES
Take 0.25 mLs by mouth every 2 hours as needed for Shortness of Breath for up to 30 days. Max Daily Amount: 60 mg, Disp-30 mL, R-0Normal    5. Palliative care patient - This is her third visit to palliative care (last visit was virtually). -     HYDROcodone homatropine (HYCODAN) 5-1.5 MG/5ML solution; Take 5 mLs by mouth 3 times daily for 30 days. Z51.5 Palliative Care Max Daily Amount: 15 mLs, Disp-450 mL, R-0Normal  -     HYDROcodone homatropine (HYCODAN) 5-1.5 MG/5ML solution; Take 5 mLs by mouth 3 times daily for 30 days. Z51.5 Palliative Care Max Daily Amount: 15 mLs, Disp-450 mL, R-0Normal  -     HYDROcodone homatropine (HYCODAN) 5-1.5 MG/5ML solution; Take 5 mLs by mouth 3 times daily for 30 days. Z51.5 Palliative Care Max Daily Amount: 15 mLs, Disp-450 mL, R-0Normal  -     DME - DURABLE MEDICAL EQUIPMENT  -     morphine sulfate 20 MG/ML concentrated oral solution; Take 0.25 mLs by mouth every 2 hours as needed for Shortness of Breath for up to 30 days. Max Daily Amount: 60 mg, Disp-30 mL, R-0Normal    Return if symptoms worsen or fail to improve, for referral to: hospice. Subjective   SUBJECTIVE/OBJECTIVE:  Mrs. Aguilar and her  of 55 years present today for ongoing support from palliative pulmonary care. This is her third visit to the clinic (2nd in person). Her  had called a week or so ago about hypoxia issues and breathlessness spells. He was requesting a 10L concentrator but the [employer] company required office visit. She was referred to the palliative care from Enedina Lin. Since our last visit, she has not required any hospitalization or ED visits. Using the hycodan BID with good results to control cough but the dyspnea spells with activity have increased. No reports of constipation. She has a history of bronchiectasis, hypoxemia, and pulmonary fibrosis. She has a history of HP with aspergillus fumigatus, treated with Prednisone.   She is originally from [place] and moved to the

## 2023-07-14 ENCOUNTER — HOME CARE VISIT (OUTPATIENT)
Dept: SCHEDULING | Facility: HOME HEALTH | Age: 73
End: 2023-07-14
Payer: OTHER GOVERNMENT

## 2023-07-14 VITALS
HEART RATE: 76 BPM | BODY MASS INDEX: 26.62 KG/M2 | HEIGHT: 61 IN | SYSTOLIC BLOOD PRESSURE: 146 MMHG | DIASTOLIC BLOOD PRESSURE: 78 MMHG | WEIGHT: 141 LBS | TEMPERATURE: 97.9 F

## 2023-07-14 PROCEDURE — 0651 HSPC ROUTINE HOME CARE

## 2023-07-14 PROCEDURE — G0155 HHCP-SVS OF CSW,EA 15 MIN: HCPCS

## 2023-07-14 PROCEDURE — G0299 HHS/HOSPICE OF RN EA 15 MIN: HCPCS

## 2023-07-14 PROCEDURE — 2500000001 HSPC NON INJECTABLE MED

## 2023-07-14 ASSESSMENT — ENCOUNTER SYMPTOMS
COUGH CHARACTERISTICS: NON-PRODUCTIVE
PAIN LOCATION - PAIN QUALITY: ACHY
CHEST TIGHTNESS: 1
COUGH: 1
HEMOPTYSIS: 0
STOOL DESCRIPTION: FORMED

## 2023-07-15 ENCOUNTER — HOME CARE VISIT (OUTPATIENT)
Dept: SCHEDULING | Facility: HOME HEALTH | Age: 73
End: 2023-07-15
Payer: OTHER GOVERNMENT

## 2023-07-15 PROCEDURE — 0651 HSPC ROUTINE HOME CARE

## 2023-07-15 NOTE — HOSPICE
Autumn Turk, 75year old female admitted to in-home hospice services on 7-14-23 for a Hospice dx: Pulmonary Fibrosis. Left Mac =30 cm. PPS 40%. Pt lives with her spouse, Jan Libman. [facility] delivered:  10 liter o2 concentrator & a nebulaizer mask. .   Already in home (pt owns): hospital bed, rollator, regular walker, nebulizer, shower chair, BSC and wheelchair. [facility] was contacted to  their o2 concentrator. Pt is A&O x 4. Pt is eating well & able to ambulate but becomes severely sob with almost no exertion. Pt coughs violently, frequently. Pt has new incontinence requiring pullups. Pt needs assistance with all of his ADLS. HHA was refused today. Pts spouse will assist pt at this time. Consents were signed at the home earlier today with CHERISE Roberts. Pt is a FULL CODE. Meds Profiled w/ [employer]: senna, prednisone & ativan  Meds ordered for local fill: roxanol. Supplies ordered from medline: Med pullups, wipes, & chux. RN educated [place] & spouse, Jan Libman, on hospice process and philosophy, medication management, pain control, skin care and protection, fall precautions, anxiety strategies, home safety, and social distancing due to COVID-19. Pt history, assessment, meds and status reviewed with patient's hospice attending MD, Dr. Mu Wood. [facility] & spouse, Jan Libman, have been made aware of [facility], referral to be initiated as needed. Patient is appropriate for hospice services at this time. HHA services were refuse at this time. SW and SC services accepted. Caregiver made aware that an RN will be completing a follow-up visit within 24 hours. Handwritten medication list, eMeter book and magnet with eMeter phone number left in home. RN educated Caregiver to call eMeter 24/7 phone line with any changes, concerns, or falls with verbalized understanding.

## 2023-07-16 PROCEDURE — 0651 HSPC ROUTINE HOME CARE

## 2023-07-16 ASSESSMENT — ENCOUNTER SYMPTOMS: HEMOPTYSIS: 0

## 2023-07-17 ENCOUNTER — HOME CARE VISIT (OUTPATIENT)
Dept: HOSPICE | Facility: HOSPICE | Age: 73
End: 2023-07-17
Payer: OTHER GOVERNMENT

## 2023-07-17 PROCEDURE — 0651 HSPC ROUTINE HOME CARE

## 2023-07-17 NOTE — CASE COMMUNICATION
contacted and spoke with Kirsten Rivera, pt's , about OA services and setting up initial visit. Kirsten Rivera was open to the visit and stated he wanted to speak with his wife to see if she wanted the  to come out. Kirsten Rivera stated that he was Jehovah's witness but Rufino Whitley was Catholic/Nepali and she has a [employer] in [place] that she speaks with. Kirsten Rivera declined  initial visit/spiritual assessment until he confirms his wife's wishes  and will talk with her about it. Stated that if she wanted the  to come in, he will call to set up the visit. Kirsten Rivera stated he would call if they needed anything and/or to set up a  visit in the future.

## 2023-07-17 NOTE — CASE COMMUNICATION
Initial visit/spiritual assessment/services declined for now. Niraj Jodie, , will call if pt wishes for  to come in. See Telephone Encounter dated 7/17/23.

## 2023-07-18 ENCOUNTER — HOME CARE VISIT (OUTPATIENT)
Dept: HOSPICE | Facility: HOSPICE | Age: 73
End: 2023-07-18
Payer: OTHER GOVERNMENT

## 2023-07-18 VITALS
DIASTOLIC BLOOD PRESSURE: 80 MMHG | SYSTOLIC BLOOD PRESSURE: 120 MMHG | HEART RATE: 65 BPM | RESPIRATION RATE: 17 BRPM | TEMPERATURE: 98.2 F

## 2023-07-18 PROCEDURE — 0651 HSPC ROUTINE HOME CARE

## 2023-07-18 PROCEDURE — G0299 HHS/HOSPICE OF RN EA 15 MIN: HCPCS

## 2023-07-18 ASSESSMENT — ENCOUNTER SYMPTOMS
COUGH: 1
CHEST TIGHTNESS: 1
COUGH CHARACTERISTICS: DRY
HEMOPTYSIS: 0

## 2023-07-18 NOTE — HOSPICE
Pt is 75 y. o.female with hospice dx Pulmonary Fibrosis. Presented hospice services to pt,spouse and son and answered any questions. Pt states she wants to remain a full code at this time. Pt/family verbalize understanding that pt's illness is terminal but they continue to be hopeful. Spouse states it has become more difficult to make medical appointments at this itme. Pt eating/drinking without any concerns at this time. Pt complains of weakness and tiring easily. Pt wears 10 liters of oxygen at all times. No major complains to oain or discomfort. Advised of team members roles and community resources. Encouraged all to vent feelings and offered emotional support. Spouse signed consents at pt's request.Reviewd emergency careplan and encouraged to contact hospice as needed. All voiced understanding.

## 2023-07-18 NOTE — HOSPICE
This RN in with Dr Jt Shipman for initial visit. Greated by patients son at door. Patient goes by the name \"Pat\". She was sitting on the couch and was very accepting of our visit. Patient and son gave brief background of her medical history and we talked about some changes to her medications. Order for Advair current dose to be changed to the formulary dose. He also would like SN to monitor fluid status to determine continued need for Lasix. Patients vital signs are stable at this visit with no edema noted. Patient and Spouse were educated on the roll of hospice care and encouraged to reach out to [facility] for any needs or concerns. Family expressed no concerns at this time and are accepting of the POC.

## 2023-07-19 PROCEDURE — 0651 HSPC ROUTINE HOME CARE

## 2023-07-20 PROCEDURE — 0651 HSPC ROUTINE HOME CARE

## 2023-07-21 PROCEDURE — 0651 HSPC ROUTINE HOME CARE

## 2023-07-22 PROCEDURE — 0651 HSPC ROUTINE HOME CARE

## 2023-07-23 PROCEDURE — 0651 HSPC ROUTINE HOME CARE

## 2023-07-24 ENCOUNTER — HOME CARE VISIT (OUTPATIENT)
Dept: SCHEDULING | Facility: HOME HEALTH | Age: 73
End: 2023-07-24
Payer: OTHER GOVERNMENT

## 2023-07-24 VITALS
TEMPERATURE: 98.2 F | RESPIRATION RATE: 20 BRPM | DIASTOLIC BLOOD PRESSURE: 76 MMHG | HEART RATE: 78 BPM | SYSTOLIC BLOOD PRESSURE: 148 MMHG | OXYGEN SATURATION: 92 %

## 2023-07-24 PROCEDURE — 2500000001 HSPC NON INJECTABLE MED

## 2023-07-24 PROCEDURE — 0651 HSPC ROUTINE HOME CARE

## 2023-07-24 PROCEDURE — G0299 HHS/HOSPICE OF RN EA 15 MIN: HCPCS

## 2023-07-24 ASSESSMENT — ENCOUNTER SYMPTOMS
COUGH: 1
COUGH CHARACTERISTICS: DRY

## 2023-07-24 NOTE — HOSPICE
Routine visit completed. RN received a call from patient's  Mayra Boogie prior to visit requesting a second oxygen concentrator for the upstairs. RN called [facility] home care and ordered second concentrator, to be delivered later this afternoon. RN was greeted at the door by patient's  and son. Akua Fu) was seated on the couch eating lunch. Patient is Tachypneic with respirations 20-21 breaths/min. Katrin denied chest discomfort or shortness of breath. Dry cough noted, non-productive per patient. Katrin is currently on 7L/min of oxygen via nasal cannula, has to be increased while ambulating.  Mayra Boogie reported patient is sleeping more during they day, sleeping until 11-12 in the afternoon, reports will go to sleep around 6pm but will wake up in the middle of the night. Patient has had episodes of stress incontinence due to urgency.  reports patient has been more forgetful and will repeat tasks, such as fixing a second lunch after already making initial lunch. Medications reviewed with son and , Son stated potassium and lasix were stopped last week after visit with dr. Mckenzie Wellington. no lower extremity edema noted. Morphine is being given for shortness of breath. Katrin has an unsteady gait, RN reviewed fall precautions with Katrin and encouraged her to use walker and to have either her son or  with her while ambulating.  agrees and encouraged patient not to get up without someone with her. RN will continue with current plan of care.  Mayra Boogie aware to call [facility] at anytime for any questions, concerns or changes.

## 2023-07-25 PROCEDURE — 0651 HSPC ROUTINE HOME CARE

## 2023-07-26 PROCEDURE — 0651 HSPC ROUTINE HOME CARE

## 2023-07-27 PROCEDURE — 0651 HSPC ROUTINE HOME CARE

## 2023-07-28 PROCEDURE — 0651 HSPC ROUTINE HOME CARE

## 2023-07-29 PROCEDURE — 0651 HSPC ROUTINE HOME CARE

## 2023-07-30 PROCEDURE — 0651 HSPC ROUTINE HOME CARE

## 2023-07-31 PROCEDURE — 0651 HSPC ROUTINE HOME CARE

## 2023-08-01 PROCEDURE — 0651 HSPC ROUTINE HOME CARE

## 2023-08-02 ENCOUNTER — HOME CARE VISIT (OUTPATIENT)
Dept: HOSPICE | Facility: HOSPICE | Age: 73
End: 2023-08-02
Payer: OTHER GOVERNMENT

## 2023-08-02 PROCEDURE — G0155 HHCP-SVS OF CSW,EA 15 MIN: HCPCS

## 2023-08-02 PROCEDURE — 0651 HSPC ROUTINE HOME CARE

## 2023-08-03 ENCOUNTER — HOME CARE VISIT (OUTPATIENT)
Dept: SCHEDULING | Facility: HOME HEALTH | Age: 73
End: 2023-08-03
Payer: OTHER GOVERNMENT

## 2023-08-03 VITALS
HEART RATE: 66 BPM | RESPIRATION RATE: 21 BRPM | TEMPERATURE: 98.4 F | SYSTOLIC BLOOD PRESSURE: 127 MMHG | DIASTOLIC BLOOD PRESSURE: 64 MMHG

## 2023-08-03 PROCEDURE — 2500000001 HSPC NON INJECTABLE MED

## 2023-08-03 PROCEDURE — G0299 HHS/HOSPICE OF RN EA 15 MIN: HCPCS

## 2023-08-03 PROCEDURE — 0651 HSPC ROUTINE HOME CARE

## 2023-08-03 ASSESSMENT — ENCOUNTER SYMPTOMS
COUGH CHARACTERISTICS: NON-PRODUCTIVE
HEMOPTYSIS: 0
COUGH: 1
DYSPNEA ACTIVITY LEVEL: AFTER AMBULATING LESS THAN 10 FT

## 2023-08-03 NOTE — HOSPICE
Greated at door my Mr Ankita Hook. Pt was sitting on couch. Mr Ankita Hook gave me a brief overview of patients progression over the past week. He is worried about maintaining the 02 saturation with his current concentrator which can only produce 5L. He is concerned because on several occasions her 02 sats have dropped into the low 50's. I assured him that I would discuss these concerns with the health care team. Pt's vitals were stable today. Her lung sounds were significant for crackles. She expressed no pain and her only concern was that she remained comfortable throughout her journey. Patient is in need of a few medications which I am ordering. Mr and Mrs Ankita Hook were instructed to call [facility] with any concerns. They both verbalized understanding and are in agreement with the POC.

## 2023-08-03 NOTE — HOSPICE
Routine visit made to assess needs and offer emotional support. Met with pt,spouse and son to discuss any needs and follow up with any concerns. Pt lying on the couch resting. Pt verbalized feeling fine today. Pt states she has a cough but has medications to assist.Pt remains independent with the assistance of spouse and son. Pt states she has met the medical dorector and he answered her questions. Pt continues to be hopeful regarding her illness. Advised of all hospice services and community resources. Encouraged all to vent feelings and offered emotional support. No immediate needs or concerns at this time. Reviewed emergency careplan and encouraged to contact hospice as needed. All voiced understanding.

## 2023-08-04 PROCEDURE — 0651 HSPC ROUTINE HOME CARE

## 2023-08-05 PROCEDURE — 0651 HSPC ROUTINE HOME CARE

## 2023-08-06 PROCEDURE — 0651 HSPC ROUTINE HOME CARE

## 2023-08-10 ENCOUNTER — HOME CARE VISIT (OUTPATIENT)
Dept: SCHEDULING | Facility: HOME HEALTH | Age: 73
End: 2023-08-10
Payer: OTHER GOVERNMENT

## 2023-08-10 PROCEDURE — G0299 HHS/HOSPICE OF RN EA 15 MIN: HCPCS

## 2023-08-12 VITALS
DIASTOLIC BLOOD PRESSURE: 82 MMHG | RESPIRATION RATE: 21 BRPM | SYSTOLIC BLOOD PRESSURE: 149 MMHG | TEMPERATURE: 98.2 F | HEART RATE: 79 BPM

## 2023-08-12 ASSESSMENT — ENCOUNTER SYMPTOMS
COUGH: 1
DYSPNEA ACTIVITY LEVEL: AT REST
COUGH CHARACTERISTICS: NON-PRODUCTIVE

## 2023-08-12 NOTE — HOSPICE
Greeted at door by Mr Omi Finley. Patient Mrs Oim Finley was sitting on couch. Upon entry I could tell that she seemed more SOB than last week. Her O2 has been increased from 7L to 12L/min to sustain a level of comfortability according to her spouse. Mrs Omi Finley got up and walked to the kitchen and she had a loud dry constant cough. Her vital signs were stable with a slight elevation in bp since last visit.  Henri Villar would like to receive a  vistit with Tahir for his own spirituality. They still have not expressed the need for a caregiver at this time. I have a list of medications to order and plan to call [facility] for supplies. No other concerns addressed at this time, family instructed to call [facility] with any concerns.  Mr and Mrs Omi Finley verbalize understanding and are in agreement with the POC

## 2023-08-16 ENCOUNTER — HOME CARE VISIT (OUTPATIENT)
Dept: HOSPICE | Facility: HOSPICE | Age: 73
End: 2023-08-16
Payer: OTHER GOVERNMENT

## 2023-08-16 ASSESSMENT — ENCOUNTER SYMPTOMS: HEMOPTYSIS: 0

## 2023-08-17 ENCOUNTER — HOME CARE VISIT (OUTPATIENT)
Dept: SCHEDULING | Facility: HOME HEALTH | Age: 73
End: 2023-08-17
Payer: OTHER GOVERNMENT

## 2023-08-17 ENCOUNTER — HOME CARE VISIT (OUTPATIENT)
Dept: HOSPICE | Facility: HOSPICE | Age: 73
End: 2023-08-17
Payer: OTHER GOVERNMENT

## 2023-08-17 VITALS
RESPIRATION RATE: 19 BRPM | SYSTOLIC BLOOD PRESSURE: 168 MMHG | HEART RATE: 79 BPM | DIASTOLIC BLOOD PRESSURE: 89 MMHG | TEMPERATURE: 97.3 F

## 2023-08-17 PROCEDURE — G0299 HHS/HOSPICE OF RN EA 15 MIN: HCPCS

## 2023-08-17 ASSESSMENT — ENCOUNTER SYMPTOMS
DYSPNEA ACTIVITY LEVEL: AT REST
CHEST TIGHTNESS: 1
COUGH: 1
COUGH CHARACTERISTICS: NON-PRODUCTIVE
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
## 2023-08-18 NOTE — HOSPICE
Greeted at door my Mr Gera Santos. Pt Pat was upstairs lying in bed. She has been considerably getting weaker over the past week and her spouse and son are nervous that she will fall and hurt herself. Patient examined and clubbing of fingernails was noted. BP was much higher today than normal 168/89. She also seemed at little more anxious today than normal. I also noticed she had some short term memory loss. Pt wanted to go downstairs today during my visit so her son and me helped get her dowstairs. Her coughing and breathing increased. Her son put a pulseoximeter on her finger and her reading after ambulating down the stairs was at 52% O2. I had her take a few deep breaths through her nose and she began to recover. It was noted that pt is a mouth breather. I educated family to have her breath through her nose at all times. WIthin a min her O2 was at 88%. I am going to contact [facility] to see if we can get bars into the bathroom to help with pt stabilization as she contines to ambulate and doesn't always ask for assistance. Patient and familly had no other concerns at this time. I instruced pt and family to contact [facility] with any concerns.  Mr and Mrs Gera Santos verbalize understanding and are in agreement with the POC

## 2023-08-21 ENCOUNTER — HOME CARE VISIT (OUTPATIENT)
Dept: HOSPICE | Facility: HOSPICE | Age: 73
End: 2023-08-21
Payer: OTHER GOVERNMENT

## 2023-08-21 NOTE — CASE COMMUNICATION
received message from Fr. Yen Orona that he made a visit to pt's , Darling Interiano, 8/21/23 and provided spiritual care for him.

## 2023-08-23 PROBLEM — Z51.5 HOSPICE CARE: Status: ACTIVE | Noted: 2023-08-23

## 2023-08-24 ENCOUNTER — HOME CARE VISIT (OUTPATIENT)
Dept: SCHEDULING | Facility: HOME HEALTH | Age: 73
End: 2023-08-24
Payer: OTHER GOVERNMENT

## 2023-08-24 VITALS
HEART RATE: 93 BPM | TEMPERATURE: 98.2 F | SYSTOLIC BLOOD PRESSURE: 134 MMHG | RESPIRATION RATE: 27 BRPM | DIASTOLIC BLOOD PRESSURE: 61 MMHG

## 2023-08-24 PROCEDURE — G0299 HHS/HOSPICE OF RN EA 15 MIN: HCPCS

## 2023-08-24 ASSESSMENT — ENCOUNTER SYMPTOMS
DYSPNEA ACTIVITY LEVEL: AT REST
COUGH: 1
COUGH CHARACTERISTICS: NON-PRODUCTIVE
STOOL DESCRIPTION: FORMED

## 2023-08-24 NOTE — HOSPICE
Greeted at door by Mr Melodie Sheth. Patient in bed upstairs. When I entered the bedroom I immediately noticed that she was in resporatory distress. She was very anxious because she was so short of breath. I gave her a dose of Roxanol to help with the SOB and to help with the agitation. Pt became less anxious and she seemed to settle a little bit more. Her breathing slowed and her 02 levels simone. She needed to be cleaned up so I got her into the shower. Afterwards took her vitals and they were stable. I contacted [facility] about another 02 concentrator and a non-rebreather. They are taking them out to her today. I am also bringing liquid roxinol on board. Family is requesting the help of a caregiver or aid for three visits a week. I spoke with Vic Thomason about this and she is going to reach out. No other concerns at this time. Patient seems to be declining so I will be increasing my visit sets.  Mr and Mrs Melodie Sheth are in agreement with the POC

## 2023-08-26 ENCOUNTER — HOME CARE VISIT (OUTPATIENT)
Dept: SCHEDULING | Facility: HOME HEALTH | Age: 73
End: 2023-08-26
Payer: OTHER GOVERNMENT

## 2023-08-26 VITALS
HEART RATE: 80 BPM | DIASTOLIC BLOOD PRESSURE: 60 MMHG | RESPIRATION RATE: 16 BRPM | TEMPERATURE: 97.6 F | SYSTOLIC BLOOD PRESSURE: 112 MMHG

## 2023-08-26 PROCEDURE — G0299 HHS/HOSPICE OF RN EA 15 MIN: HCPCS

## 2023-08-26 ASSESSMENT — ENCOUNTER SYMPTOMS
HEMOPTYSIS: 0
PAIN LOCATION - PAIN QUALITY: ACHY

## 2023-08-27 ENCOUNTER — HOME CARE VISIT (OUTPATIENT)
Dept: SCHEDULING | Facility: HOME HEALTH | Age: 73
End: 2023-08-27
Payer: OTHER GOVERNMENT

## 2023-08-27 ASSESSMENT — ENCOUNTER SYMPTOMS: HEMOPTYSIS: 0

## 2023-08-28 ENCOUNTER — HOME CARE VISIT (OUTPATIENT)
Dept: HOSPICE | Facility: HOSPICE | Age: 73
End: 2023-08-28
Payer: OTHER GOVERNMENT

## 2023-08-28 ENCOUNTER — HOME CARE VISIT (OUTPATIENT)
Dept: SCHEDULING | Facility: HOME HEALTH | Age: 73
End: 2023-08-28
Payer: OTHER GOVERNMENT

## 2023-08-28 NOTE — HOSPICE
Jennifer Henderson, requested  visit.  met with [facility]. Son was caring for Maximo Bernstein who was on O2 and mobile with assistance to the sofa. Pt appeared comfortable with no pain level expressed or observed. Pt was using her O2 and SOB following moving to sofa from bedroom. Allan spoke of his plans before and following her death. His idea is to do a Zoom service with selected people who love and support them. Allan also spoke of his issues with his sister and his feelings that he continues to struggle with surrounding the death of his mother. Allan acknowledged Fr. Melton's visit and stated that he liked him and appreciated his care.  provided spiritual care through pastoral conversation, active listening, supportive responses, and prayer. Next visit will be as needed/requested by [facility] Pt is declining and Allan is aware of the changes she is making.

## 2023-08-29 ASSESSMENT — ENCOUNTER SYMPTOMS: HEMOPTYSIS: 0

## 2023-08-30 ENCOUNTER — HOME CARE VISIT (OUTPATIENT)
Dept: SCHEDULING | Facility: HOME HEALTH | Age: 73
End: 2023-08-30
Payer: OTHER GOVERNMENT

## 2023-08-30 ENCOUNTER — HOME CARE VISIT (OUTPATIENT)
Dept: HOSPICE | Facility: HOSPICE | Age: 73
End: 2023-08-30
Payer: OTHER GOVERNMENT

## 2023-08-30 PROCEDURE — G0156 HHCP-SVS OF AIDE,EA 15 MIN: HCPCS

## 2023-08-30 PROCEDURE — G0155 HHCP-SVS OF CSW,EA 15 MIN: HCPCS

## 2023-08-31 ENCOUNTER — HOME CARE VISIT (OUTPATIENT)
Dept: SCHEDULING | Facility: HOME HEALTH | Age: 73
End: 2023-08-31
Payer: OTHER GOVERNMENT

## 2023-08-31 VITALS
HEART RATE: 71 BPM | SYSTOLIC BLOOD PRESSURE: 115 MMHG | TEMPERATURE: 97.7 F | DIASTOLIC BLOOD PRESSURE: 63 MMHG | RESPIRATION RATE: 16 BRPM

## 2023-08-31 PROCEDURE — G0299 HHS/HOSPICE OF RN EA 15 MIN: HCPCS

## 2023-08-31 ASSESSMENT — ENCOUNTER SYMPTOMS: DYSPNEA ACTIVITY LEVEL: AFTER AMBULATING LESS THAN 10 FT

## 2023-08-31 NOTE — HOSPICE
Met at door by patients son Israel Mims. Patient Jenae Armstrong was sitting in living room. Patient was alert and oriented. Patients vital signs were stable. Lung sounds were diminished and Rhonchi was noted in bilateral lungs. Jenae Armstrong was much more comfortable at todays visit. Spouse Jose Giraldo expressed concern about caregiver support. He is unable to have eyes on Pat at all times and she is very impulsive at times. She does not ask for help and they are worried that she will fall. I expresed the importance of asking for help, rising slowly from seated positions, and utalized assistive devices. Medications were discussed and will be ordered next week. Vilma's appetite has increased and her anxiety has decreased. No other concerns at this time. Patient and family were instructed to call [facility] with any concerns.  Family verbalized understanding and are in agreement with the POC

## 2023-09-01 ENCOUNTER — HOME CARE VISIT (OUTPATIENT)
Dept: SCHEDULING | Facility: HOME HEALTH | Age: 73
End: 2023-09-01
Payer: OTHER GOVERNMENT

## 2023-09-01 PROCEDURE — G0156 HHCP-SVS OF AIDE,EA 15 MIN: HCPCS

## 2023-09-01 ASSESSMENT — ENCOUNTER SYMPTOMS: HEMOPTYSIS: 0

## 2023-09-01 NOTE — HOSPICE
Routine visit made today to assess needs and offer emotional support. Met with pt/spouse and son to discuss any needs or concerns. Pt lying on couch sleeping on/off during visit. Pt was able to verbalize feelings appropriately. Spouse states pt is sleeping more than before. No immediate concerns with pain or discomfort at this time. Pt continues to eat/drink without any concerns. Hospice aide is visiting weekly to assist with personal care. Spouse states this services is very helpful. Advised of resources to assist with pt's care and needs. Spouse states they are discussing  arrangements. Spouse considering utilizing [facility] to assist with arrangements. Family from out os states will not be visiting at this time. Encouraged all to vent feelings and offered support. Praised spouse/son for providing good care and encouraged to continue. Reviewed emergency care plan and encouraged to contact hospice as needed. All voiced understanding.

## 2023-09-06 ENCOUNTER — HOME CARE VISIT (OUTPATIENT)
Dept: SCHEDULING | Facility: HOME HEALTH | Age: 73
End: 2023-09-06
Payer: OTHER GOVERNMENT

## 2023-09-06 PROCEDURE — G0156 HHCP-SVS OF AIDE,EA 15 MIN: HCPCS

## 2023-09-07 ENCOUNTER — HOME CARE VISIT (OUTPATIENT)
Dept: SCHEDULING | Facility: HOME HEALTH | Age: 73
End: 2023-09-07
Payer: OTHER GOVERNMENT

## 2023-09-07 VITALS
SYSTOLIC BLOOD PRESSURE: 152 MMHG | HEART RATE: 66 BPM | RESPIRATION RATE: 19 BRPM | DIASTOLIC BLOOD PRESSURE: 82 MMHG | TEMPERATURE: 97.4 F

## 2023-09-07 PROCEDURE — G0299 HHS/HOSPICE OF RN EA 15 MIN: HCPCS

## 2023-09-07 ASSESSMENT — ENCOUNTER SYMPTOMS
COUGH: 1
COUGH CHARACTERISTICS: DRY
STOOL DESCRIPTION: FORMED

## 2023-09-08 ENCOUNTER — HOME CARE VISIT (OUTPATIENT)
Dept: SCHEDULING | Facility: HOME HEALTH | Age: 73
End: 2023-09-08
Payer: OTHER GOVERNMENT

## 2023-09-12 ENCOUNTER — HOME CARE VISIT (OUTPATIENT)
Dept: SCHEDULING | Facility: HOME HEALTH | Age: 73
End: 2023-09-12
Payer: OTHER GOVERNMENT

## 2023-09-12 PROCEDURE — G0156 HHCP-SVS OF AIDE,EA 15 MIN: HCPCS

## 2023-09-14 ENCOUNTER — HOME CARE VISIT (OUTPATIENT)
Dept: SCHEDULING | Facility: HOME HEALTH | Age: 73
End: 2023-09-14
Payer: OTHER GOVERNMENT

## 2023-09-14 PROCEDURE — G0299 HHS/HOSPICE OF RN EA 15 MIN: HCPCS

## 2023-09-15 ENCOUNTER — HOME CARE VISIT (OUTPATIENT)
Dept: SCHEDULING | Facility: HOME HEALTH | Age: 73
End: 2023-09-15
Payer: OTHER GOVERNMENT

## 2023-09-16 VITALS
HEART RATE: 76 BPM | SYSTOLIC BLOOD PRESSURE: 139 MMHG | TEMPERATURE: 98.4 F | RESPIRATION RATE: 17 BRPM | DIASTOLIC BLOOD PRESSURE: 80 MMHG

## 2023-09-16 ASSESSMENT — ENCOUNTER SYMPTOMS
COUGH CHARACTERISTICS: DRY
STOOL DESCRIPTION: FORMED
COUGH: 1

## 2023-09-16 NOTE — HOSPICE
Greeted by Mr Shon Ricketts. Mayra Duran and RN were present at visit. Patient Maximo Diallo was sitting on the couch. There was no distress noted. Patient is still experiencing a lot of SOB, and continues the need for O2. Patient was not in any pain. Patient verbalized that she wished for he spouse Maxx Lemon to shower her. I will discuss this with the aide. Patient continues to do as much as possible under the circumstances of her condition. Lynn Jacobs expressed his concern about Pat's urine being orange with a foul odor. Could be the start of a UTI. I will reach out to the provider for input. Medications and supplies are needed. I will order before end of day. No other concerns at this time. Instructed to call [facility] with any concerns.  Family verbalized understanding and are in agreement with the POC

## 2023-09-19 ENCOUNTER — HOME CARE VISIT (OUTPATIENT)
Dept: SCHEDULING | Facility: HOME HEALTH | Age: 73
End: 2023-09-19
Payer: OTHER GOVERNMENT

## 2023-09-21 ENCOUNTER — HOME CARE VISIT (OUTPATIENT)
Dept: SCHEDULING | Facility: HOME HEALTH | Age: 73
End: 2023-09-21
Payer: OTHER GOVERNMENT

## 2023-09-21 VITALS
HEART RATE: 63 BPM | RESPIRATION RATE: 18 BRPM | SYSTOLIC BLOOD PRESSURE: 120 MMHG | DIASTOLIC BLOOD PRESSURE: 80 MMHG | TEMPERATURE: 98.1 F

## 2023-09-21 PROCEDURE — G0299 HHS/HOSPICE OF RN EA 15 MIN: HCPCS

## 2023-09-21 ASSESSMENT — ENCOUNTER SYMPTOMS
COUGH CHARACTERISTICS: DRY
STOOL DESCRIPTION: FORMED
COUGH: 1

## 2023-09-21 NOTE — HOSPICE
Greeted by Jessica Valdez. Vanessa Esparza was sitting on couch. No distress noted. Patients appetite continues to be adequate. Patient still having frequency and urgency with urination. Bowels are regular. Jessica Valdez proceeded to tell me that he is having restless nights due to Pat coughing and often times not re-applying her oxygen mask. He has waken up to her often having blue lips and struggles to convince her to put her 02 back on. Patient is very independent and seems to not want to burden anyone. Vital signs were stable and medications reviewed with Pts son Milo Cruz. No other concerns at this time. Instructed to call [facility] with any concerns.  Family verbalized understanding and are in agreement with the POC

## 2023-09-22 ENCOUNTER — HOME CARE VISIT (OUTPATIENT)
Dept: HOSPICE | Facility: HOSPICE | Age: 73
End: 2023-09-22
Payer: OTHER GOVERNMENT

## 2023-09-22 ENCOUNTER — HOME CARE VISIT (OUTPATIENT)
Dept: SCHEDULING | Facility: HOME HEALTH | Age: 73
End: 2023-09-22
Payer: OTHER GOVERNMENT

## 2023-09-22 PROCEDURE — G0156 HHCP-SVS OF AIDE,EA 15 MIN: HCPCS

## 2023-09-22 NOTE — CASE COMMUNICATION
contacted Avila Councilman, pt's , to see how he was doing and offered a visit for today. He stated he was doing ok and open to a visit.  offered today, Donnelly Councilman declined it saying he had a cardiologist apt today and requested Monday, 9/25/23.  agreed and set the apt for then btw 10/12. Donnelly Councilman thanked the  for calling and stated he would call if he needed anything.

## 2023-09-25 ENCOUNTER — HOME CARE VISIT (OUTPATIENT)
Dept: SCHEDULING | Facility: HOME HEALTH | Age: 73
End: 2023-09-25
Payer: OTHER GOVERNMENT

## 2023-09-25 NOTE — HOSPICE
Gray Bray was dressed and sitting on the sofa of the living room using the new O2 mask provided. She was watching TV on the big screen. Gray Bray stated she was doing ok. She appeared comfortable with no pain level expressed or observed. Jose Arechiga, , and the  spoke in the room. She talked about his concerns and challenges he is facing. Talked about the on going concerns with his wife, his issues surrounding his estranged sister, his plans for his mother's memorial service that he wants to do, and his school. Jose Geni appeared less stressed and shared that he is now seeing a psychologist regarding his issues surrounding his sister and his mother's care/death.  provided spiritual care through pastoral conversation, active listening,  and supportive responses. Next visit will be as needed/requested by and for Jose Arechiga. Pt continues to decline  services as she is of the [employer].

## 2023-09-26 ENCOUNTER — HOME CARE VISIT (OUTPATIENT)
Dept: SCHEDULING | Facility: HOME HEALTH | Age: 73
End: 2023-09-26
Payer: OTHER GOVERNMENT

## 2023-09-26 ENCOUNTER — HOME CARE VISIT (OUTPATIENT)
Dept: HOSPICE | Facility: HOSPICE | Age: 73
End: 2023-09-26
Payer: OTHER GOVERNMENT

## 2023-09-26 PROCEDURE — G0155 HHCP-SVS OF CSW,EA 15 MIN: HCPCS

## 2023-09-26 PROCEDURE — G0156 HHCP-SVS OF AIDE,EA 15 MIN: HCPCS

## 2023-09-27 ASSESSMENT — ENCOUNTER SYMPTOMS: HEMOPTYSIS: 0

## 2023-09-27 NOTE — HOSPICE
Routine visit madet o assess needs and offer emotional support. Met with pt and spouse to follow up with any needs or concerns. Pt verbalized that she is managing well today. Hospice aide had visited earlier to assist with personal care. Pt is not receptive to hospice aide visiting . Spouse states she si capable of providing her own bath. Spouse encourages pt to utilize these services for safety concerns. Pt states she is eating/drinking wothout any concerns. Pt concernd about her breathing. Spouse advised staff that is a dependent on his insurance. For hospice records they have pt having medicare. Spouse states spt has never had medicare and  is their primary insurance. Spouse called  during visit and provided information. Encouraged all to vent feelings and offered emotional support. Praised spouse Lino Betzaida for providing good care and encouraged to continue. Reviewed emergency care plan and encouraged to contact hospice as needed. All voiced understanding.

## 2023-09-28 ENCOUNTER — HOME CARE VISIT (OUTPATIENT)
Dept: SCHEDULING | Facility: HOME HEALTH | Age: 73
End: 2023-09-28
Payer: OTHER GOVERNMENT

## 2023-09-28 PROCEDURE — G0299 HHS/HOSPICE OF RN EA 15 MIN: HCPCS

## 2023-09-29 ENCOUNTER — HOME CARE VISIT (OUTPATIENT)
Dept: SCHEDULING | Facility: HOME HEALTH | Age: 73
End: 2023-09-29
Payer: OTHER GOVERNMENT

## 2023-09-29 VITALS
HEART RATE: 79 BPM | TEMPERATURE: 98.3 F | SYSTOLIC BLOOD PRESSURE: 150 MMHG | DIASTOLIC BLOOD PRESSURE: 90 MMHG | RESPIRATION RATE: 21 BRPM

## 2023-09-29 PROCEDURE — G0156 HHCP-SVS OF AIDE,EA 15 MIN: HCPCS

## 2023-09-29 ASSESSMENT — ENCOUNTER SYMPTOMS
COUGH CHARACTERISTICS: NON-PRODUCTIVE
COUGH: 1
STOOL DESCRIPTION: FORMED

## 2023-09-29 NOTE — HOSPICE
Greeted by patients son Kerry Delarosa at door. Patient Jag Hicks was sitting in the living room with spouse Maribel Matute and Maame Liang (dog). Patient did not show any immediate signs of distress. She did start to cough when she removed her 02mask to take a drink. Her 02 is now at 14L per min and she is still having a hard time recovering after ambulation of about 20 feet. Jag Hicks tends to get SOB when communicating also. Crackles and Rhonchi noted in bilateral lower lobes and upper lobes are diminished. Nail beds are clubbed but capillary refill is brisk. Jag Hicks is still trying to exert her independance for as long as possible. Spoke with Maribel Matute and Kerry Washington about notible differences and how to treat acute SOB. Supplies being ordered from [employer]. Medications reviewed. No other concerns at this time.  Family verbalizes understanding and are in agreement with the POC

## 2023-10-04 ENCOUNTER — HOME CARE VISIT (OUTPATIENT)
Dept: SCHEDULING | Facility: HOME HEALTH | Age: 73
End: 2023-10-04
Payer: OTHER GOVERNMENT

## 2023-10-04 PROCEDURE — G0156 HHCP-SVS OF AIDE,EA 15 MIN: HCPCS

## 2023-10-05 ENCOUNTER — HOME CARE VISIT (OUTPATIENT)
Dept: SCHEDULING | Facility: HOME HEALTH | Age: 73
End: 2023-10-05
Payer: OTHER GOVERNMENT

## 2023-10-05 VITALS
HEART RATE: 82 BPM | DIASTOLIC BLOOD PRESSURE: 75 MMHG | SYSTOLIC BLOOD PRESSURE: 120 MMHG | RESPIRATION RATE: 21 BRPM | TEMPERATURE: 98.5 F

## 2023-10-05 PROCEDURE — G0299 HHS/HOSPICE OF RN EA 15 MIN: HCPCS

## 2023-10-05 ASSESSMENT — ENCOUNTER SYMPTOMS
COUGH CHARACTERISTICS: DRY
STOOL DESCRIPTION: FORMED
COUGH: 1

## 2023-10-05 NOTE — HOSPICE
Greeted at door by patients son Toribio Murphy. Patient Trish Hernandez is sitting with her  Francine Le on the couch. Francine Le stated that she has seemed slightly more confused this week and is having some immediate memory loss. Patient has lung sounds in both lungs with crackles noted throughout. Patient continues to be on 14L 02 continously. Patient unaware of certain episodes of shortness of breath and sometimes goes for long periods of time without 02. Hoping that the new mask helps to maintain her 02 levels at night. Patients appetite and fluid intake remain adequate. Clubbing of finger nails and toe nails noted. Patient continues to state that she is ok and healthy. Medications reviewed and ordered. Instructed to call [facility] with any concerns. Mr and Mrs [facility] verbalize understanding and are in agreement with the POC      Mr Addis Bull is a 76 yo female admitted with Pulmonary Fibrosis. Patients 02 has increased from 10L 02 via nasal cannula to 14L contiuous 02  via simple mask. After amnulation of approximately 10 feet the patient is weak and SOB causing her to become dizzy and disoriented. Patient tries to maintain independence but currently needs help with almost all ADL's. Bathing requires two people due to patients demand for oxygen. Patietns arm circumference measures 11cm. Increase in frequency of morphine for SOB to every three hours and also PRN before ambulation.      SN 1 time per week for assessment of decline symptom manangement and education/ support  HHA 2 times a week for assistance with ADL's

## 2023-10-10 ENCOUNTER — HOME CARE VISIT (OUTPATIENT)
Dept: SCHEDULING | Facility: HOME HEALTH | Age: 73
End: 2023-10-10
Payer: OTHER GOVERNMENT

## 2023-10-10 PROCEDURE — G0156 HHCP-SVS OF AIDE,EA 15 MIN: HCPCS

## 2023-10-12 ENCOUNTER — HOME CARE VISIT (OUTPATIENT)
Dept: HOSPICE | Facility: HOSPICE | Age: 73
End: 2023-10-12
Payer: OTHER GOVERNMENT

## 2023-10-12 ENCOUNTER — HOME CARE VISIT (OUTPATIENT)
Dept: SCHEDULING | Facility: HOME HEALTH | Age: 73
End: 2023-10-12
Payer: OTHER GOVERNMENT

## 2023-10-12 VITALS
RESPIRATION RATE: 19 BRPM | TEMPERATURE: 98.3 F | HEART RATE: 69 BPM | DIASTOLIC BLOOD PRESSURE: 75 MMHG | SYSTOLIC BLOOD PRESSURE: 150 MMHG

## 2023-10-12 PROCEDURE — G0299 HHS/HOSPICE OF RN EA 15 MIN: HCPCS

## 2023-10-12 ASSESSMENT — ENCOUNTER SYMPTOMS
HEMOPTYSIS: 0
STOOL DESCRIPTION: FORMED
COUGH: 1
COUGH CHARACTERISTICS: DRY

## 2023-10-12 NOTE — HOSPICE
Greeted by patients  Jd Sorensen. Present at visit was patient Rajinder Banegas and RN. Patient sitting on couch in living area. Patient looking more weak at todays visit which was verbalized by Jd Sorensen. He stated that the disease process is wearing her down. Patient use of accessory muscles for breathing was evident at today's visit. Blood pressure was slightly elevated. She was more tired today than usual and talked much less. She needed assistance to the bathroom and began to cough after ambulating about 5 feet. Jd Sorensen is concerned about her falling due to the fact that she continues to remain as independant as possible. Medications and supplies being ordered. No other concerns expressed at this visit. Instructed to call [facility] with any concerns. Mr and Mrs Lola Preston verbalize Gil Sanchez and are in agreement with the POC. Patient Veena Myers Miriyue Molina has shown serious decline since admission on 7/14/2023. Upon admission she was sustaining 02 levels above 90% on 2L/min and is now using 12L/min to sustain these same levels. Patient remains eligible for hopce care due to increased SOB rt inability to take in adequate 02 AEB shallow breathing, pursed lips, and use of accessory muscles. Patient used to ambulate 10-15 feet before coughing and becoming SOB and now is only able to walk 5-10 feet with assistance. Patient's fingernails are clubbing which is a sign of poor 02 perfusion. Lung sounds in bases are almost absent and crackles are heard throught the upper lobes bilaterally.

## 2023-10-13 ENCOUNTER — HOME CARE VISIT (OUTPATIENT)
Dept: SCHEDULING | Facility: HOME HEALTH | Age: 73
End: 2023-10-13
Payer: OTHER GOVERNMENT

## 2023-10-13 PROCEDURE — G0156 HHCP-SVS OF AIDE,EA 15 MIN: HCPCS

## 2023-10-17 ENCOUNTER — HOME CARE VISIT (OUTPATIENT)
Dept: SCHEDULING | Facility: HOME HEALTH | Age: 73
End: 2023-10-17
Payer: OTHER GOVERNMENT

## 2023-10-17 PROCEDURE — G0156 HHCP-SVS OF AIDE,EA 15 MIN: HCPCS

## 2023-10-19 ENCOUNTER — HOME CARE VISIT (OUTPATIENT)
Dept: SCHEDULING | Facility: HOME HEALTH | Age: 73
End: 2023-10-19
Payer: OTHER GOVERNMENT

## 2023-10-19 PROCEDURE — G0299 HHS/HOSPICE OF RN EA 15 MIN: HCPCS

## 2023-10-20 ENCOUNTER — HOME CARE VISIT (OUTPATIENT)
Dept: SCHEDULING | Facility: HOME HEALTH | Age: 73
End: 2023-10-20
Payer: OTHER GOVERNMENT

## 2023-10-20 PROCEDURE — G0156 HHCP-SVS OF AIDE,EA 15 MIN: HCPCS

## 2023-10-22 VITALS — DIASTOLIC BLOOD PRESSURE: 75 MMHG | RESPIRATION RATE: 19 BRPM | HEART RATE: 69 BPM | SYSTOLIC BLOOD PRESSURE: 136 MMHG

## 2023-10-22 ASSESSMENT — ENCOUNTER SYMPTOMS
STOOL DESCRIPTION: FORMED
COUGH: 1
COUGH CHARACTERISTICS: DRY

## 2023-10-24 ENCOUNTER — HOME CARE VISIT (OUTPATIENT)
Dept: SCHEDULING | Facility: HOME HEALTH | Age: 73
End: 2023-10-24
Payer: OTHER GOVERNMENT

## 2023-10-24 PROCEDURE — G0156 HHCP-SVS OF AIDE,EA 15 MIN: HCPCS

## 2023-10-26 ENCOUNTER — HOME CARE VISIT (OUTPATIENT)
Dept: SCHEDULING | Facility: HOME HEALTH | Age: 73
End: 2023-10-26
Payer: OTHER GOVERNMENT

## 2023-10-26 VITALS
SYSTOLIC BLOOD PRESSURE: 155 MMHG | TEMPERATURE: 96.8 F | RESPIRATION RATE: 20 BRPM | HEART RATE: 67 BPM | DIASTOLIC BLOOD PRESSURE: 78 MMHG

## 2023-10-26 PROCEDURE — G0299 HHS/HOSPICE OF RN EA 15 MIN: HCPCS

## 2023-10-26 ASSESSMENT — ENCOUNTER SYMPTOMS
COUGH: 1
STOOL DESCRIPTION: FORMED
COUGH CHARACTERISTICS: DRY

## 2023-10-26 NOTE — HOSPICE
Greeted by pt's son Yael Johansen at door. Pt Vilma was upstairs in bed. Upon assessment I noticed her 02 mask was hanging off of her face. Yael Johansen quickly put the mask back on. She was awake  but very drowsy due to not sleepng very well last night. Family is becoming concerned with her frequent wandering and potiential for falls. Even with ongoing teaching, she continues to have poor judgement and poor safety awareness. Her spouse Mikey Sanchez claimed that \"Vilma is going to do what Arturo Li wants to do. \" Bilateral lungs sounds were severly diminished and crackles heard through all lobes. Medications reviewed and supplies noted. Skin remains intact warm and without tenting. Patients appetite remains good eating 100% of all meals. No other concerns at this time. Family instructed to call [facility] with any concerns. Mr and Mrs Paige Eric verbalize understanding and are in agreement with the POC.

## 2023-10-30 ENCOUNTER — HOME CARE VISIT (OUTPATIENT)
Dept: HOSPICE | Facility: HOSPICE | Age: 73
End: 2023-10-30
Payer: OTHER GOVERNMENT

## 2023-10-30 PROCEDURE — G0155 HHCP-SVS OF CSW,EA 15 MIN: HCPCS

## 2023-10-31 ENCOUNTER — HOME CARE VISIT (OUTPATIENT)
Dept: HOSPICE | Facility: HOSPICE | Age: 73
End: 2023-10-31
Payer: OTHER GOVERNMENT

## 2023-11-01 ASSESSMENT — ENCOUNTER SYMPTOMS: HEMOPTYSIS: 0

## 2023-11-01 NOTE — HOSPICE
Routine visit made to assess needs and offer emotional support. Met with pt,spouse and son to discuss any needs or concerns. Spoke with pt/family regarding any needs or concerns. Spouse states pt continues to have bad days. Pt eating/drinking without any concerns. No major concerns with pain at this time. Spouse requesting information regarding cremation. Provided phone numbers to [id number] Spouse states he will make contact and advise staff of his plans. [place] is well managed at this time. Praised spouse and son for providing good care and encouraged to continue. Encouraged all to vent feelings and offered emotional support. Reviewed emergency care plan and encouraged to contact hospice as needed. All voiced understanding.

## 2023-11-03 ENCOUNTER — HOME CARE VISIT (OUTPATIENT)
Dept: SCHEDULING | Facility: HOME HEALTH | Age: 73
End: 2023-11-03
Payer: OTHER GOVERNMENT

## 2023-11-03 PROCEDURE — G0156 HHCP-SVS OF AIDE,EA 15 MIN: HCPCS

## 2023-11-07 ENCOUNTER — HOME CARE VISIT (OUTPATIENT)
Dept: SCHEDULING | Facility: HOME HEALTH | Age: 73
End: 2023-11-07
Payer: OTHER GOVERNMENT

## 2023-11-07 PROCEDURE — G0156 HHCP-SVS OF AIDE,EA 15 MIN: HCPCS

## 2023-11-09 ENCOUNTER — HOME CARE VISIT (OUTPATIENT)
Dept: SCHEDULING | Facility: HOME HEALTH | Age: 73
End: 2023-11-09
Payer: OTHER GOVERNMENT

## 2023-11-09 VITALS
SYSTOLIC BLOOD PRESSURE: 122 MMHG | HEART RATE: 74 BPM | TEMPERATURE: 97.4 F | DIASTOLIC BLOOD PRESSURE: 63 MMHG | RESPIRATION RATE: 22 BRPM

## 2023-11-09 PROCEDURE — G0299 HHS/HOSPICE OF RN EA 15 MIN: HCPCS

## 2023-11-09 ASSESSMENT — ENCOUNTER SYMPTOMS
CHEST TIGHTNESS: 1
STOOL DESCRIPTION: FIRM
COUGH CHARACTERISTICS: NON-PRODUCTIVE
COUGH: 1

## 2023-11-09 NOTE — HOSPICE
Greeted by Dariela Anderson in living room sleeping on couch. No distress noted. She woke for assessment and then fell back asleep. She is becoming a little more unsteady due to increased weakness. Jd Sorensen explained that she really has issues when in the bathroom because she closes the door and he is not able to see what she is doing all of the time. Due to her Anabaptism beliefs and modesty a bed side commode was refused at this time. I am going to order a bed alarm for pt due to her frequent episodes of wandering at night and sometimes during the day when Jd Sorensen and Francine are preoccupied. Respite was discussed but also refused at this time. Medications reviewed and supplies were inventoried. Order for [employer] and [employer] entered today. Patients O2 demand continues to increase. Lung sounds crackles rhonci and diminished bilaterally. Instructed to call [facility] with any concerns.

## 2023-11-10 ENCOUNTER — HOME CARE VISIT (OUTPATIENT)
Dept: SCHEDULING | Facility: HOME HEALTH | Age: 73
End: 2023-11-10
Payer: OTHER GOVERNMENT

## 2023-11-10 PROCEDURE — G0156 HHCP-SVS OF AIDE,EA 15 MIN: HCPCS

## 2023-11-14 ENCOUNTER — HOME CARE VISIT (OUTPATIENT)
Dept: SCHEDULING | Facility: HOME HEALTH | Age: 73
End: 2023-11-14
Payer: OTHER GOVERNMENT

## 2023-11-14 PROCEDURE — G0156 HHCP-SVS OF AIDE,EA 15 MIN: HCPCS

## 2023-11-16 ENCOUNTER — HOME CARE VISIT (OUTPATIENT)
Dept: SCHEDULING | Facility: HOME HEALTH | Age: 73
End: 2023-11-16
Payer: OTHER GOVERNMENT

## 2023-11-16 VITALS
SYSTOLIC BLOOD PRESSURE: 137 MMHG | RESPIRATION RATE: 23 BRPM | TEMPERATURE: 98.4 F | DIASTOLIC BLOOD PRESSURE: 78 MMHG | HEART RATE: 65 BPM

## 2023-11-16 PROCEDURE — G0299 HHS/HOSPICE OF RN EA 15 MIN: HCPCS

## 2023-11-16 ASSESSMENT — ENCOUNTER SYMPTOMS
CHEST TIGHTNESS: 1
COUGH: 1
COUGH CHARACTERISTICS: NON-PRODUCTIVE
STOOL DESCRIPTION: FORMED
DYSPNEA ACTIVITY LEVEL: WHILE SPEAKING

## 2023-11-16 NOTE — HOSPICE
Greeted by Vicente Arce at the door. Mariah Carrillo came upstairs and was frustrated that she had walked to the door. I could see how weak she was getting and how quickly her oxygen had dropped. He replaced her nasal cannula with the simple mask to help with her SOB. Crackles are heard throughout all lobes of her lungs now. Her fingernails are extremely clubbed and nailbeds have a bluish tint. She always smiles and says that she is ok. Appetite unchanged still eating 3 meals a day. No bowel or bladder issues. Patient is using 14L of O2 continuously. Medications ordered and supplies reviewed. Instructed to call [facility] with any concerns.

## 2023-11-17 ENCOUNTER — HOME CARE VISIT (OUTPATIENT)
Dept: SCHEDULING | Facility: HOME HEALTH | Age: 73
End: 2023-11-17
Payer: OTHER GOVERNMENT

## 2023-11-17 PROCEDURE — G0156 HHCP-SVS OF AIDE,EA 15 MIN: HCPCS

## 2023-11-19 ENCOUNTER — HOME CARE VISIT (OUTPATIENT)
Dept: HOSPICE | Facility: HOSPICE | Age: 73
End: 2023-11-19
Payer: OTHER GOVERNMENT

## 2023-11-21 ENCOUNTER — HOME CARE VISIT (OUTPATIENT)
Dept: SCHEDULING | Facility: HOME HEALTH | Age: 73
End: 2023-11-21
Payer: OTHER GOVERNMENT

## 2023-11-21 PROCEDURE — G0156 HHCP-SVS OF AIDE,EA 15 MIN: HCPCS

## 2023-11-24 ENCOUNTER — HOME CARE VISIT (OUTPATIENT)
Dept: HOSPICE | Facility: HOSPICE | Age: 73
End: 2023-11-24
Payer: OTHER GOVERNMENT

## 2023-11-28 ENCOUNTER — HOME CARE VISIT (OUTPATIENT)
Dept: SCHEDULING | Facility: HOME HEALTH | Age: 73
End: 2023-11-28
Payer: OTHER GOVERNMENT

## 2023-11-28 ENCOUNTER — HOME CARE VISIT (OUTPATIENT)
Dept: HOSPICE | Facility: HOSPICE | Age: 73
End: 2023-11-28
Payer: OTHER GOVERNMENT

## 2023-11-28 VITALS
RESPIRATION RATE: 22 BRPM | SYSTOLIC BLOOD PRESSURE: 117 MMHG | DIASTOLIC BLOOD PRESSURE: 62 MMHG | TEMPERATURE: 96.8 F | HEART RATE: 68 BPM

## 2023-11-28 PROCEDURE — G0156 HHCP-SVS OF AIDE,EA 15 MIN: HCPCS

## 2023-11-28 PROCEDURE — G0155 HHCP-SVS OF CSW,EA 15 MIN: HCPCS

## 2023-11-28 PROCEDURE — G0299 HHS/HOSPICE OF RN EA 15 MIN: HCPCS

## 2023-11-28 NOTE — HOSPICE
Family called concerned about increasing weakness in [place]. [facility] states that over the past 10 days he has noticed a significant change in his wife (pat). I assessed patient and she definitely has a decrease in mobility, increase in weakness, and her upper lobes bilaterally have significant crackles. Her 02 concentrator is at a 12 currently and I advised them to lower it to a 10 as tolerated by patient. Plans to see patient again this Thurs and increase visits to 2 times per week for assessment of decline.  Instructed to call [facility] with any concerns

## 2023-11-30 ENCOUNTER — HOME CARE VISIT (OUTPATIENT)
Dept: HOSPICE | Facility: HOSPICE | Age: 73
End: 2023-11-30
Payer: OTHER GOVERNMENT

## 2023-11-30 ASSESSMENT — ENCOUNTER SYMPTOMS: HEMOPTYSIS: 0

## 2023-11-30 NOTE — HOSPICE
Routine visit made to assess needs and offer emotional support. Met with pt and family to discuss any needs or concerns. Pt in bed resting with son at bedside. Pt is declining and requiring more assistance at this time. Spoke of options regarding caregivers and other needs. Advised that hospice aide care services can increase visits to assist with personal care. Spouse and son receptive. Spoke of utilizing supplemental care hours to allow assistance for spouse and son. [address] and scheduled requested hours. Plans are to remain at home at this time. Spouse is discussing  arrangements and will advise hospice staff of plans. Praised son and spouse for providing good care. Encouraged all to vent feelings and offered emotional support. Reviewed emergency care plan and encouraged to contact hospice as needed. All voiced understanding.

## 2023-12-01 ENCOUNTER — HOME CARE VISIT (OUTPATIENT)
Dept: SCHEDULING | Facility: HOME HEALTH | Age: 73
End: 2023-12-01
Payer: OTHER GOVERNMENT

## 2023-12-01 PROCEDURE — G0156 HHCP-SVS OF AIDE,EA 15 MIN: HCPCS

## 2023-12-02 ENCOUNTER — HOME CARE VISIT (OUTPATIENT)
Dept: SCHEDULING | Facility: HOME HEALTH | Age: 73
End: 2023-12-02
Payer: OTHER GOVERNMENT

## 2023-12-02 ASSESSMENT — ENCOUNTER SYMPTOMS: HEMOPTYSIS: 0

## 2023-12-03 ENCOUNTER — HOME CARE VISIT (OUTPATIENT)
Dept: SCHEDULING | Facility: HOME HEALTH | Age: 73
End: 2023-12-03
Payer: OTHER GOVERNMENT

## 2023-12-03 VITALS
DIASTOLIC BLOOD PRESSURE: 70 MMHG | SYSTOLIC BLOOD PRESSURE: 114 MMHG | HEART RATE: 76 BPM | TEMPERATURE: 97 F | RESPIRATION RATE: 18 BRPM

## 2023-12-03 PROCEDURE — G0299 HHS/HOSPICE OF RN EA 15 MIN: HCPCS

## 2023-12-03 ASSESSMENT — ENCOUNTER SYMPTOMS: HEMOPTYSIS: 0

## 2023-12-05 NOTE — HOSPICE
greeted by Tushar Chen. Pt upstairs in bed. very weak. responds to voice. Patient tachypnea 25breaths per min. complains of lung pain. Patient reduced to 10l per min o2. Morphine given every hour for sob. Patient seems to be declining. Family had no questions. Assured a nurse would be out over weekend to assess decline. Instructed to call [facility] with concerns.

## 2023-12-06 NOTE — HOSPICE
Greeted at door by [place]. Steve Epstein was laying upstairs in bed. She was more oriented today than last week, however she is very weak. She is not able to sit up herself and needs assistance to move. During lunch her son George Strodu had to prop her on pillows and a wedge due to her core strength being so diminshed that she is not able to hold a sitting position without falling over. Switched her to a nasal cannula due to her contunious grabbing at the mask and taking it off. She seemed to like the high flow cannula better. Her appetite has decreased to about 2/3 of her normal. Patient is still confused. Anthony asked about nail cutting and hair cut. I said that I would reach out and see what I could find. Bowel and bladder no issues. Instructed to call [facility] with any concerns.

## (undated) DEVICE — SYR 3ML LL TIP 1/10ML GRAD --

## (undated) DEVICE — BW-412T DISP COMBO CLEANING BRUSH: Brand: SINGLE USE COMBINATION CLEANING BRUSH

## (undated) DEVICE — CONTAINER PREFIL FRMLN 40ML --

## (undated) DEVICE — BRONCHOSCOPY PACK: Brand: MEDLINE INDUSTRIES, INC.

## (undated) DEVICE — SNARE POLYP SM W13MMXL240CM SHTH DIA2.4MM OVL FLX DISP

## (undated) DEVICE — GUIDEWIRE ENDOSCP L260CM DIA0.035IN STD BILI HYDRPHLC EXT

## (undated) DEVICE — NDL PRT INJ NSAF BLNT 18GX1.5 --

## (undated) DEVICE — SYRINGE, LUER SLIP, STERILE, 60ML: Brand: MEDLINE

## (undated) DEVICE — ACCESS AND DELIVERY CATHETER: Brand: SPYSCOPE™ DS II

## (undated) DEVICE — CANNULA NSL ORAL AD FOR CAPNOFLEX CO2 O2 AIRLFE

## (undated) DEVICE — SYR 10ML LUER LOK 1/5ML GRAD --

## (undated) DEVICE — REM POLYHESIVE ADULT PATIENT RETURN ELECTRODE: Brand: VALLEYLAB

## (undated) DEVICE — SPHINCTEROTOME: Brand: JAGTOME RX 44

## (undated) DEVICE — GARMENT,MEDLINE,DVT,INT,CALF,MED, GEN2: Brand: MEDLINE

## (undated) DEVICE — AIR/WATER CLEANING ADAPTER FOR OLYMPUS® GI ENDOSCOPE: Brand: BULLDOG®

## (undated) DEVICE — SYR 50ML SLIP TIP NSAF LF STRL --

## (undated) DEVICE — TRIPLE LUMEN NEEDLE KNIFE: Brand: RX NEEDLE KNIFE XL

## (undated) DEVICE — SINGLE USE BIOPSY VALVE MAJ-210: Brand: SINGLE USE BIOPSY VALVE (STERILE)

## (undated) DEVICE — SYR 5ML 1/5 GRAD LL NSAF LF --

## (undated) DEVICE — KENDALL RADIOLUCENT FOAM MONITORING ELECTRODE RECTANGULAR SHAPE: Brand: KENDALL

## (undated) DEVICE — DEVICE LCK BILI RAP EXCHG OLPS --

## (undated) DEVICE — GAUZE,SPONGE,4"X4",12PLY,WOVEN,NS,LF: Brand: MEDLINE

## (undated) DEVICE — BLOCK BITE AD 60FR W/ VELC STRP ADDRESSES MOST PT AND

## (undated) DEVICE — SINGLE USE SUCTION VALVE MAJ-209: Brand: SINGLE USE SUCTION VALVE (STERILE)

## (undated) DEVICE — MOUTHPIECE ENDOSCP 20X27MM --

## (undated) DEVICE — MASK O2 O2 LN L7FT CO2 LN L10FT FEM BG 750ML M CONC ADPT

## (undated) DEVICE — CANNULA NSL AD CO2 SAMP FOR DASH 3000 4000 MON LO FLO

## (undated) DEVICE — SINGLE-USE BIOPSY FORCEPS: Brand: RADIAL JAW 4

## (undated) DEVICE — ADULT SPO2 SENSOR: Brand: NELLCOR

## (undated) DEVICE — CONNECTOR TBNG OD5-7MM O2 END DISP

## (undated) DEVICE — BLOCK BITE 60FR W/ DENT RIM SCRIP ONLY

## (undated) DEVICE — FORCEPS BX L240CM JAW DIA2.8MM L CAP W/ NDL MIC MESH TOOTH

## (undated) DEVICE — RETRIEVAL BALLOON CATHETER: Brand: EXTRACTOR™ PRO RX

## (undated) DEVICE — TUBING, SUCTION, 3/16" X 6', STRAIGHT: Brand: MEDLINE

## (undated) DEVICE — VALVE SUCTION AIR H2O SET ORCA POD + DISP